# Patient Record
Sex: MALE | Race: WHITE | NOT HISPANIC OR LATINO | URBAN - METROPOLITAN AREA
[De-identification: names, ages, dates, MRNs, and addresses within clinical notes are randomized per-mention and may not be internally consistent; named-entity substitution may affect disease eponyms.]

---

## 2023-04-08 ENCOUNTER — INPATIENT (INPATIENT)
Facility: HOSPITAL | Age: 68
LOS: 15 days | Discharge: HOME | DRG: 91 | End: 2023-04-24
Attending: FAMILY MEDICINE | Admitting: FAMILY MEDICINE
Payer: MEDICARE

## 2023-04-08 VITALS
HEART RATE: 98 BPM | HEIGHT: 70 IN | RESPIRATION RATE: 20 BRPM | SYSTOLIC BLOOD PRESSURE: 139 MMHG | DIASTOLIC BLOOD PRESSURE: 83 MMHG | WEIGHT: 169.98 LBS | TEMPERATURE: 97 F | OXYGEN SATURATION: 98 %

## 2023-04-08 DIAGNOSIS — R41.82 ALTERED MENTAL STATUS, UNSPECIFIED: ICD-10-CM

## 2023-04-08 DIAGNOSIS — E03.9 HYPOTHYROIDISM, UNSPECIFIED: ICD-10-CM

## 2023-04-08 DIAGNOSIS — R33.9 RETENTION OF URINE, UNSPECIFIED: ICD-10-CM

## 2023-04-08 DIAGNOSIS — Z29.9 ENCOUNTER FOR PROPHYLACTIC MEASURES, UNSPECIFIED: ICD-10-CM

## 2023-04-08 DIAGNOSIS — N18.30 CHRONIC KIDNEY DISEASE, STAGE 3 UNSPECIFIED: ICD-10-CM

## 2023-04-08 DIAGNOSIS — Z98.890 OTHER SPECIFIED POSTPROCEDURAL STATES: Chronic | ICD-10-CM

## 2023-04-08 DIAGNOSIS — G47.00 INSOMNIA, UNSPECIFIED: ICD-10-CM

## 2023-04-08 DIAGNOSIS — F31.9 BIPOLAR DISORDER, UNSPECIFIED: ICD-10-CM

## 2023-04-08 LAB
ACETONE SERPL-MCNC: NEGATIVE — SIGNIFICANT CHANGE UP
ALBUMIN SERPL ELPH-MCNC: 4.2 G/DL — SIGNIFICANT CHANGE UP (ref 3.3–5)
ALP SERPL-CCNC: 116 U/L — SIGNIFICANT CHANGE UP (ref 40–120)
ALT FLD-CCNC: 48 U/L — SIGNIFICANT CHANGE UP (ref 12–78)
AMMONIA BLD-MCNC: 23 UMOL/L — SIGNIFICANT CHANGE UP (ref 11–32)
ANION GAP SERPL CALC-SCNC: 4 MMOL/L — LOW (ref 5–17)
APPEARANCE UR: CLEAR — SIGNIFICANT CHANGE UP
APTT BLD: 28.9 SEC — SIGNIFICANT CHANGE UP (ref 27.5–35.5)
AST SERPL-CCNC: 36 U/L — SIGNIFICANT CHANGE UP (ref 15–37)
BASOPHILS # BLD AUTO: 0.05 K/UL — SIGNIFICANT CHANGE UP (ref 0–0.2)
BASOPHILS NFR BLD AUTO: 0.4 % — SIGNIFICANT CHANGE UP (ref 0–2)
BILIRUB SERPL-MCNC: 0.9 MG/DL — SIGNIFICANT CHANGE UP (ref 0.2–1.2)
BILIRUB UR-MCNC: NEGATIVE — SIGNIFICANT CHANGE UP
BUN SERPL-MCNC: 24 MG/DL — HIGH (ref 7–23)
CALCIUM SERPL-MCNC: 9.9 MG/DL — SIGNIFICANT CHANGE UP (ref 8.5–10.1)
CHLORIDE SERPL-SCNC: 110 MMOL/L — HIGH (ref 96–108)
CO2 SERPL-SCNC: 23 MMOL/L — SIGNIFICANT CHANGE UP (ref 22–31)
COLOR SPEC: YELLOW — SIGNIFICANT CHANGE UP
COMMENT - URINE: SIGNIFICANT CHANGE UP
CREAT SERPL-MCNC: 1.9 MG/DL — HIGH (ref 0.5–1.3)
DIFF PNL FLD: NEGATIVE — SIGNIFICANT CHANGE UP
EGFR: 38 ML/MIN/1.73M2 — LOW
EOSINOPHIL # BLD AUTO: 0.17 K/UL — SIGNIFICANT CHANGE UP (ref 0–0.5)
EOSINOPHIL NFR BLD AUTO: 1.4 % — SIGNIFICANT CHANGE UP (ref 0–6)
FLUAV AG NPH QL: SIGNIFICANT CHANGE UP
FLUBV AG NPH QL: SIGNIFICANT CHANGE UP
GLUCOSE SERPL-MCNC: 98 MG/DL — SIGNIFICANT CHANGE UP (ref 70–99)
GLUCOSE UR QL: NEGATIVE — SIGNIFICANT CHANGE UP
HCT VFR BLD CALC: 40.4 % — SIGNIFICANT CHANGE UP (ref 39–50)
HGB BLD-MCNC: 13.3 G/DL — SIGNIFICANT CHANGE UP (ref 13–17)
IMM GRANULOCYTES NFR BLD AUTO: 0.4 % — SIGNIFICANT CHANGE UP (ref 0–0.9)
INR BLD: 1.19 RATIO — HIGH (ref 0.88–1.16)
KETONES UR-MCNC: ABNORMAL
LACTATE SERPL-SCNC: 0.9 MMOL/L — SIGNIFICANT CHANGE UP (ref 0.7–2)
LEUKOCYTE ESTERASE UR-ACNC: NEGATIVE — SIGNIFICANT CHANGE UP
LITHIUM SERPL-MCNC: 2.3 MMOL/L — CRITICAL HIGH (ref 0.6–1.2)
LYMPHOCYTES # BLD AUTO: 1.39 K/UL — SIGNIFICANT CHANGE UP (ref 1–3.3)
LYMPHOCYTES # BLD AUTO: 11.3 % — LOW (ref 13–44)
MCHC RBC-ENTMCNC: 30.2 PG — SIGNIFICANT CHANGE UP (ref 27–34)
MCHC RBC-ENTMCNC: 32.9 GM/DL — SIGNIFICANT CHANGE UP (ref 32–36)
MCV RBC AUTO: 91.6 FL — SIGNIFICANT CHANGE UP (ref 80–100)
MONOCYTES # BLD AUTO: 1.03 K/UL — HIGH (ref 0–0.9)
MONOCYTES NFR BLD AUTO: 8.4 % — SIGNIFICANT CHANGE UP (ref 2–14)
NEUTROPHILS # BLD AUTO: 9.61 K/UL — HIGH (ref 1.8–7.4)
NEUTROPHILS NFR BLD AUTO: 78.1 % — HIGH (ref 43–77)
NITRITE UR-MCNC: NEGATIVE — SIGNIFICANT CHANGE UP
NRBC # BLD: 0 /100 WBCS — SIGNIFICANT CHANGE UP (ref 0–0)
PH UR: 6 — SIGNIFICANT CHANGE UP (ref 5–8)
PLATELET # BLD AUTO: 203 K/UL — SIGNIFICANT CHANGE UP (ref 150–400)
POTASSIUM SERPL-MCNC: 3.5 MMOL/L — SIGNIFICANT CHANGE UP (ref 3.5–5.3)
POTASSIUM SERPL-SCNC: 3.5 MMOL/L — SIGNIFICANT CHANGE UP (ref 3.5–5.3)
PROCALCITONIN SERPL-MCNC: 0.08 NG/ML — SIGNIFICANT CHANGE UP
PROT SERPL-MCNC: 7.4 G/DL — SIGNIFICANT CHANGE UP (ref 6–8.3)
PROT UR-MCNC: 15
PROTHROM AB SERPL-ACNC: 13.9 SEC — HIGH (ref 10.5–13.4)
RBC # BLD: 4.41 M/UL — SIGNIFICANT CHANGE UP (ref 4.2–5.8)
RBC # FLD: 12.5 % — SIGNIFICANT CHANGE UP (ref 10.3–14.5)
RBC CASTS # UR COMP ASSIST: SIGNIFICANT CHANGE UP /HPF (ref 0–4)
RSV RNA NPH QL NAA+NON-PROBE: SIGNIFICANT CHANGE UP
SARS-COV-2 RNA SPEC QL NAA+PROBE: SIGNIFICANT CHANGE UP
SODIUM SERPL-SCNC: 137 MMOL/L — SIGNIFICANT CHANGE UP (ref 135–145)
SP GR SPEC: 1.01 — SIGNIFICANT CHANGE UP (ref 1.01–1.02)
UROBILINOGEN FLD QL: NEGATIVE — SIGNIFICANT CHANGE UP
WBC # BLD: 12.3 K/UL — HIGH (ref 3.8–10.5)
WBC # FLD AUTO: 12.3 K/UL — HIGH (ref 3.8–10.5)
WBC UR QL: SIGNIFICANT CHANGE UP

## 2023-04-08 PROCEDURE — 71045 X-RAY EXAM CHEST 1 VIEW: CPT | Mod: 26

## 2023-04-08 PROCEDURE — 70450 CT HEAD/BRAIN W/O DYE: CPT | Mod: 26,MA

## 2023-04-08 PROCEDURE — 99285 EMERGENCY DEPT VISIT HI MDM: CPT

## 2023-04-08 PROCEDURE — 93010 ELECTROCARDIOGRAM REPORT: CPT

## 2023-04-08 RX ORDER — BUPROPION HYDROCHLORIDE 150 MG/1
150 TABLET, EXTENDED RELEASE ORAL DAILY
Refills: 0 | Status: DISCONTINUED | OUTPATIENT
Start: 2023-04-08 | End: 2023-04-08

## 2023-04-08 RX ORDER — QUETIAPINE FUMARATE 200 MG/1
200 TABLET, FILM COATED ORAL AT BEDTIME
Refills: 0 | Status: DISCONTINUED | OUTPATIENT
Start: 2023-04-08 | End: 2023-04-08

## 2023-04-08 RX ORDER — TAMSULOSIN HYDROCHLORIDE 0.4 MG/1
0.4 CAPSULE ORAL AT BEDTIME
Refills: 0 | Status: DISCONTINUED | OUTPATIENT
Start: 2023-04-08 | End: 2023-04-10

## 2023-04-08 RX ORDER — TAMSULOSIN HYDROCHLORIDE 0.4 MG/1
0.4 CAPSULE ORAL
Refills: 0 | Status: DISCONTINUED | OUTPATIENT
Start: 2023-04-08 | End: 2023-04-08

## 2023-04-08 RX ORDER — SODIUM CHLORIDE 9 MG/ML
1000 INJECTION INTRAMUSCULAR; INTRAVENOUS; SUBCUTANEOUS ONCE
Refills: 0 | Status: COMPLETED | OUTPATIENT
Start: 2023-04-08 | End: 2023-04-08

## 2023-04-08 RX ORDER — ACETAMINOPHEN 500 MG
650 TABLET ORAL EVERY 6 HOURS
Refills: 0 | Status: DISCONTINUED | OUTPATIENT
Start: 2023-04-08 | End: 2023-04-08

## 2023-04-08 RX ORDER — ONDANSETRON 8 MG/1
4 TABLET, FILM COATED ORAL EVERY 8 HOURS
Refills: 0 | Status: DISCONTINUED | OUTPATIENT
Start: 2023-04-08 | End: 2023-04-12

## 2023-04-08 RX ORDER — BENZTROPINE MESYLATE 1 MG
1 TABLET ORAL DAILY
Refills: 0 | Status: DISCONTINUED | OUTPATIENT
Start: 2023-04-08 | End: 2023-04-08

## 2023-04-08 RX ORDER — SODIUM CHLORIDE 9 MG/ML
1000 INJECTION INTRAMUSCULAR; INTRAVENOUS; SUBCUTANEOUS
Refills: 0 | Status: DISCONTINUED | OUTPATIENT
Start: 2023-04-08 | End: 2023-04-09

## 2023-04-08 RX ORDER — LITHIUM CARBONATE 300 MG/1
600 TABLET, EXTENDED RELEASE ORAL AT BEDTIME
Refills: 0 | Status: DISCONTINUED | OUTPATIENT
Start: 2023-04-08 | End: 2023-04-08

## 2023-04-08 RX ORDER — LITHIUM CARBONATE 300 MG/1
300 TABLET, EXTENDED RELEASE ORAL
Refills: 0 | Status: DISCONTINUED | OUTPATIENT
Start: 2023-04-08 | End: 2023-04-08

## 2023-04-08 RX ORDER — LEVOTHYROXINE SODIUM 125 MCG
12.5 TABLET ORAL AT BEDTIME
Refills: 0 | Status: DISCONTINUED | OUTPATIENT
Start: 2023-04-08 | End: 2023-04-11

## 2023-04-08 RX ORDER — LANOLIN ALCOHOL/MO/W.PET/CERES
3 CREAM (GRAM) TOPICAL AT BEDTIME
Refills: 0 | Status: DISCONTINUED | OUTPATIENT
Start: 2023-04-08 | End: 2023-04-08

## 2023-04-08 RX ORDER — HEPARIN SODIUM 5000 [USP'U]/ML
5000 INJECTION INTRAVENOUS; SUBCUTANEOUS EVERY 8 HOURS
Refills: 0 | Status: DISCONTINUED | OUTPATIENT
Start: 2023-04-08 | End: 2023-04-11

## 2023-04-08 RX ORDER — HALOPERIDOL DECANOATE 100 MG/ML
1 INJECTION INTRAMUSCULAR EVERY 6 HOURS
Refills: 0 | Status: DISCONTINUED | OUTPATIENT
Start: 2023-04-08 | End: 2023-04-12

## 2023-04-08 RX ORDER — LEVOTHYROXINE SODIUM 125 MCG
25 TABLET ORAL DAILY
Refills: 0 | Status: DISCONTINUED | OUTPATIENT
Start: 2023-04-08 | End: 2023-04-08

## 2023-04-08 RX ADMIN — SODIUM CHLORIDE 150 MILLILITER(S): 9 INJECTION INTRAMUSCULAR; INTRAVENOUS; SUBCUTANEOUS at 21:20

## 2023-04-08 RX ADMIN — SODIUM CHLORIDE 150 MILLILITER(S): 9 INJECTION INTRAMUSCULAR; INTRAVENOUS; SUBCUTANEOUS at 16:10

## 2023-04-08 RX ADMIN — Medication 12.5 MICROGRAM(S): at 23:28

## 2023-04-08 RX ADMIN — SODIUM CHLORIDE 1000 MILLILITER(S): 9 INJECTION INTRAMUSCULAR; INTRAVENOUS; SUBCUTANEOUS at 07:24

## 2023-04-08 RX ADMIN — Medication 1 MILLIGRAM(S): at 12:46

## 2023-04-08 RX ADMIN — HEPARIN SODIUM 5000 UNIT(S): 5000 INJECTION INTRAVENOUS; SUBCUTANEOUS at 13:43

## 2023-04-08 RX ADMIN — HEPARIN SODIUM 5000 UNIT(S): 5000 INJECTION INTRAVENOUS; SUBCUTANEOUS at 21:20

## 2023-04-08 NOTE — H&P ADULT - NSICDXFAMILYHX_GEN_ALL_CORE_FT
FAMILY HISTORY:  Mother  Still living? Unknown  FH: Alzheimers disease, Age at diagnosis: Age Unknown  FH: Parkinson's disease, Age at diagnosis: 71-80

## 2023-04-08 NOTE — H&P ADULT - ASSESSMENT
68 y/o male PMH BPD (diagnosed 2005), CKD3, hypothyroidism Urinary retention and insomnia presents to the ED for altered mental status with increased weakness and decreased po intake. Admit for AMS.

## 2023-04-08 NOTE — ED PROVIDER NOTE - SIGNIFICANT NEGATIVE FINDINGS
no headache, no chest pain, no SOB, no palpitations, no fever, no chills, no abdominal pain, no n/v/d, no urinary symptoms, no GI  bleeding. no neuro changes.

## 2023-04-08 NOTE — H&P ADULT - PROBLEM SELECTOR PLAN 2
Chronic, diagnosed 2005.  - Recent admission to [facility] for manic episode, Bupropion decreased from 150mg TID to once a day  - Continue bupropion XL 150mg daily  - Continue lithium 300mg AM daily and 600mg at bedtime daily  - Previously following with Psych NP: Concha Amaro, CHRISTINAP, now has appointment with [facility]  - Psych (Dr. Floyd) consulted, appreciate recs

## 2023-04-08 NOTE — PATIENT PROFILE ADULT - FUNCTIONAL ASSESSMENT - BASIC MOBILITY ASSESSMENT TYPE
[de-identified] : 
Admission

## 2023-04-08 NOTE — H&P ADULT - PROBLEM SELECTOR PLAN 1
AMS in the setting of progressive dementia vs. medications vs. electrolyte imbalance  - Patient w/ unsteady gait, multiple mechanical falls, decreased po intake, dysphagia  - Leukocytosis (12.30) of unclear etiology on admission  - CT head: Grossly, no acute hemorrhage mass or mass effect seen. Better  CT is recommended when   patient can tolerate it or sedation be given.  - UA: negative   - AAOx0 (person) on admission  - Fall, aspiration precautions  - Speech and Swallow consulted, appreciate recs. NPO except meds for now  - F/u UCx, BCx x2  - Neuro ( ___) consulted, f/u recs  - Psych (Dr. Floyd) consulted, f/u recs  - PT/OT, SW, nutrition consulted , f/u recs AMS in the setting of progressive dementia vs. medications vs. electrolyte imbalance  - Patient w/ unsteady gait, multiple mechanical falls, decreased po intake, dysphagia  - Leukocytosis (12.30) of unclear etiology on admission  - CT head: Grossly, no acute hemorrhage mass or mass effect seen. Better  CT is recommended when   patient can tolerate it or sedation be given.  - UA: negative   - AAOx0 (person) on admission  - Fall, aspiration precautions  - Speech and Swallow consulted, appreciate recs. NPO except meds for now  - F/u UCx, BCx x2  - DAY TEAM TO CONSULT NEUROLOGY- unavailable over the weekend  - Psych (Dr. Floyd) consulted, f/u recs  - PT/OT, SW, nutrition consulted , f/u recs

## 2023-04-08 NOTE — H&P ADULT - NSICDXPASTMEDICALHX_GEN_ALL_CORE_FT
PAST MEDICAL HISTORY:  Bipolar disorder     Hard of hearing     Hypothyroidism     Insomnia     Stage 3 chronic kidney disease     Urinary retention

## 2023-04-08 NOTE — ED PROVIDER NOTE - CONSTITUTIONAL, MLM
Chronically ill,  awake, confusion , oriented to person, place,  and in moderate  distress. normal...

## 2023-04-08 NOTE — PROGRESS NOTE ADULT - SUBJECTIVE AND OBJECTIVE BOX
Renal consult called for MICHELL, Elevated lithium level. Continue IV hydration with saline.   Repeat stat lithium levels. No indication for HD at this time. Full consult to follow.

## 2023-04-08 NOTE — PATIENT PROFILE ADULT - FALL HARM RISK - HARM RISK INTERVENTIONS

## 2023-04-08 NOTE — ED ADULT NURSE NOTE - CHIEF COMPLAINT QUOTE
Pt was discharged from psych at [facility] 2-3 weeks ago after manic episode. Pt family states pt has AMS x 10 days, confused, multiple falls

## 2023-04-08 NOTE — H&P ADULT - NSHPSOCIALHISTORY_GEN_ALL_CORE
Tobacco: denied  EtOH: Former, used to drink beer, quit in 2005 after BPD diagnosis  Recreational drug use: denied  Lives with: wife  Ambulates: with assistance  ADLs: needs assistance  Occupation: Former postal , retired last year  Vaccinations: COVID UTD s/p antibody infusion therapy, Flu UTD

## 2023-04-08 NOTE — H&P ADULT - PROBLEM SELECTOR PLAN 3
Chronic   - Duenas cath placed in ED  - Output 800 cc urine per RN  - Continue home tamsulosin 0.4mg BID

## 2023-04-08 NOTE — H&P ADULT - HISTORY OF PRESENT ILLNESS
66 y/o male PMH BPD (diagnosed 2005), CKD3, hypothyroidism Urinary retention and insomnia presents to the ED for altered mental status with increased weakness and decreased po intake. Patient unable to answer questions secondary to mental status. History obtained from patient's wife and son via telephone. Patient was recently admitted to [facility] for a manic episode ~3 weeks ago. There his dose of Bupropion was decreased 150mg TID to once daily, he also had a normal CT head and was diagnosed with CKD3.  Soon after discharge, patient began to have increased weakness, unsteady gait with multiple mechanical falls, slurred speech, and decreased po intake. These symptoms seemed to have a rapid onset ~10 days and have not improved. His son also notes, he seemed to have pain with swallowing as well.   Of note, patient's mom also had hx of Alzheimer's dementia and parkinson's disease which she was diagnosed with in her 70s and family is concerned he might be developing a neurological disorder.   At time of exam, patient unable to answer questions or follow commands.     ED Course  T: 96.6F HR 98 /83 RR 20 SpO2 98% on RA  Labs:  wbc 12.30 PT/INR 13.9/1.19 BUN/Cr 24/1.90  UA: negative  CXR: grossly clear (f/u official read)  EKG: NSR HR 80  CT Head: Grossly, no acute hemorrhage mass or mass effect seen. Better  CT is recommended when patient can tolerate it or sedation be given.    In the ED, pt given NS 1L bolus x1, Ativan 1mg IVPx1

## 2023-04-08 NOTE — ED ADULT NURSE NOTE - HPI (INCLUDE ILLNESS QUALITY, SEVERITY, DURATION, TIMING, CONTEXT, MODIFYING FACTORS, ASSOCIATED SIGNS AND SYMPTOMS)
Son stated patient has history of Bipolar and manic behavior. Was off meds, recently discharged from [facility] to home, unable to sleep, pacing, throwing things, and unaware of safety limitations for the past 10 days. difficult to manage at home.

## 2023-04-08 NOTE — ED PROVIDER NOTE - CLINICAL SUMMARY MEDICAL DECISION MAKING FREE TEXT BOX
68 y/o male Bipolar disorder, Stage-3 kidney disease, C/C increasing weakness,  mental confusion, unsteady and  multiple falls. ....  IVF, labs CT head

## 2023-04-08 NOTE — ED ADULT NURSE NOTE - NSIMPLEMENTINTERV_GEN_ALL_ED
Implemented All Fall Risk Interventions:  [place] to call system. Call bell, personal items and telephone within reach. Instruct patient to call for assistance. Room bathroom lighting operational. Non-slip footwear when patient is off stretcher. Physically safe environment: no spills, clutter or unnecessary equipment. Stretcher in lowest position, wheels locked, appropriate side rails in place. Provide visual cue, wrist band, yellow gown, etc. Monitor gait and stability. Monitor for mental status changes and reorient to person, place, and time. Review medications for side effects contributing to fall risk. Reinforce activity limits and safety measures with patient and family.

## 2023-04-08 NOTE — H&P ADULT - NSHPPHYSICALEXAM_GEN_ALL_CORE
PHYSICAL EXAM: +carey cath  GENERAL: confused male laying in bed   HEAD:  Atraumatic, Normocephalic  EYES: EOMI, PERRLA, conjunctiva and sclera clear  NECK: Normal ROM, No JVD  CHEST/LUNG: +mouth breathing, Clear to auscultation bilaterally; No wheeze  HEART: Regular rate and rhythm; No murmurs, rubs, or gallops  ABDOMEN: Soft, Nontender, Nondistended; Bowel sounds present  EXTREMITIES:  contracted b/l UE, 2+ Peripheral Pulses, No clubbing, cyanosis, or edema  PSYCH: AAOx0  NEUROLOGY: unable to answer questions or follow commands  SKIN: No rashes or lesions

## 2023-04-08 NOTE — ED ADULT NURSE NOTE - OBJECTIVE STATEMENT
son stated patient was at [facility] for bipolar disorder and manic behaviors> pt was off meds and re-started on discharge, Patient has been steadily declining for the past 10 days. inability to sleep, pacing, and throwing items around the home.

## 2023-04-08 NOTE — H&P ADULT - NSHPLABSRESULTS_GEN_ALL_CORE
137  |  110<H>  |  24<H>  ----------------------------<  98  3.5   |  23  |  1.90<H>    Ca    9.9      2023 07:00    TPro  7.4  /  Alb  4.2  /  TBili  0.9  /  DBili  x   /  AST  36  /  ALT  48  /  AlkPhos  116                              13.3   12.30 )-----------( 203      ( 2023 07:00 )             40.4             LIVER FUNCTIONS - ( 2023 07:00 )  Alb: 4.2 g/dL / Pro: 7.4 g/dL / ALK PHOS: 116 U/L / ALT: 48 U/L / AST: 36 U/L / GGT: x             PT/INR - ( 2023 07:00 )   PT: 13.9 sec;   INR: 1.19 ratio         PTT - ( 2023 07:00 )  PTT:28.9 sec    Urinalysis Basic - ( 2023 10:40 )    Color: Yellow / Appearance: Clear / S.010 / pH: x  Gluc: x / Ketone: Trace  / Bili: Negative / Urobili: Negative   Blood: x / Protein: 15 / Nitrite: Negative   Leuk Esterase: Negative / RBC: 0-2 /HPF / WBC 0-2   Sq Epi: x / Non Sq Epi: x / Bacteria: x

## 2023-04-08 NOTE — ED PROVIDER NOTE - OBJECTIVE STATEMENT
Pt was discharged from psych at [facility] 2-3 weeks ago after manic episode. Pt family states pt has AMS x 10 days, confused, multiple falls  altered mental status 66 y/o male Bipolar disorder, Stage-3 kidney disease, C/C increasing mental confusion and multiple falls x 10 days   Recent  psych admission at [facility] 2-3 weeks ago for  anitha . no headache, no chest pain, no SOB, no palpitations, no fever, no chills, no abdominal pain, no n/v/d, no urinary symptoms, no GI  bleeding. no neuro changes. 66 y/o male Bipolar disorder, Stage-3 kidney disease, C/C increasing weakness,  mental confusion, unsteady and  multiple falls.   Recent  psych admission at [facility] 2-3 weeks ago for  anitha . no headache, no chest pain, no SOB, no palpitations, no fever, no chills, no abdominal pain, no n/v/d, no urinary symptoms, no GI  bleeding. no neuro changes.

## 2023-04-08 NOTE — ED PROVIDER NOTE - ENMT, MLM
Airway patent, Nasal mucosa clear. Mouth  mucosa dry. Throat has no vesicles, no oropharyngeal exudates and uvula is midline.

## 2023-04-08 NOTE — ED PROVIDER NOTE - CARE PLAN
Principal Discharge DX:	AMS (altered mental status)  Secondary Diagnosis:	Frequent falls   1 Principal Discharge DX:	AMS (altered mental status)  Secondary Diagnosis:	Frequent falls  Secondary Diagnosis:	Chronic renal disease

## 2023-04-08 NOTE — H&P ADULT - PROBLEM SELECTOR PLAN 4
Patient recently diagnosed w/ CKD3 at [facility]  - Cr. 1.9 on admission, baseline unknown  - Continue to monitor with daily BMP  - Avoid nephrotoxic agents.

## 2023-04-08 NOTE — ED ADULT TRIAGE NOTE - CHIEF COMPLAINT QUOTE
Pt was discharged from psych at [facility] 2-3 weeks ago after manic episode. Pt family states pt has AMS x 10 days, confused, multiple falls

## 2023-04-09 DIAGNOSIS — T56.891A TOXIC EFFECT OF OTHER METALS, ACCIDENTAL (UNINTENTIONAL), INITIAL ENCOUNTER: ICD-10-CM

## 2023-04-09 LAB
ALBUMIN SERPL ELPH-MCNC: 3.4 G/DL — SIGNIFICANT CHANGE UP (ref 3.3–5)
ALP SERPL-CCNC: 103 U/L — SIGNIFICANT CHANGE UP (ref 40–120)
ALT FLD-CCNC: 33 U/L — SIGNIFICANT CHANGE UP (ref 12–78)
ANION GAP SERPL CALC-SCNC: 6 MMOL/L — SIGNIFICANT CHANGE UP (ref 5–17)
AST SERPL-CCNC: 28 U/L — SIGNIFICANT CHANGE UP (ref 15–37)
BASOPHILS # BLD AUTO: 0.04 K/UL — SIGNIFICANT CHANGE UP (ref 0–0.2)
BASOPHILS NFR BLD AUTO: 0.4 % — SIGNIFICANT CHANGE UP (ref 0–2)
BILIRUB SERPL-MCNC: 0.8 MG/DL — SIGNIFICANT CHANGE UP (ref 0.2–1.2)
BUN SERPL-MCNC: 15 MG/DL — SIGNIFICANT CHANGE UP (ref 7–23)
CALCIUM SERPL-MCNC: 8.9 MG/DL — SIGNIFICANT CHANGE UP (ref 8.5–10.1)
CHLORIDE SERPL-SCNC: 120 MMOL/L — HIGH (ref 96–108)
CO2 SERPL-SCNC: 21 MMOL/L — LOW (ref 22–31)
CREAT SERPL-MCNC: 1.3 MG/DL — SIGNIFICANT CHANGE UP (ref 0.5–1.3)
CULTURE RESULTS: NO GROWTH — SIGNIFICANT CHANGE UP
EGFR: 59 ML/MIN/1.73M2 — LOW
EOSINOPHIL # BLD AUTO: 0.08 K/UL — SIGNIFICANT CHANGE UP (ref 0–0.5)
EOSINOPHIL NFR BLD AUTO: 0.8 % — SIGNIFICANT CHANGE UP (ref 0–6)
GLUCOSE SERPL-MCNC: 80 MG/DL — SIGNIFICANT CHANGE UP (ref 70–99)
HCT VFR BLD CALC: 34.4 % — LOW (ref 39–50)
HCV AB S/CO SERPL IA: 0.09 S/CO — SIGNIFICANT CHANGE UP (ref 0–0.99)
HCV AB SERPL-IMP: SIGNIFICANT CHANGE UP
HGB BLD-MCNC: 11.3 G/DL — LOW (ref 13–17)
IMM GRANULOCYTES NFR BLD AUTO: 0.5 % — SIGNIFICANT CHANGE UP (ref 0–0.9)
LITHIUM SERPL-MCNC: 1.3 MMOL/L — HIGH (ref 0.6–1.2)
LITHIUM SERPL-MCNC: 1.6 MMOL/L — CRITICAL HIGH (ref 0.6–1.2)
LYMPHOCYTES # BLD AUTO: 1.13 K/UL — SIGNIFICANT CHANGE UP (ref 1–3.3)
LYMPHOCYTES # BLD AUTO: 10.6 % — LOW (ref 13–44)
MCHC RBC-ENTMCNC: 30.2 PG — SIGNIFICANT CHANGE UP (ref 27–34)
MCHC RBC-ENTMCNC: 32.8 GM/DL — SIGNIFICANT CHANGE UP (ref 32–36)
MCV RBC AUTO: 92 FL — SIGNIFICANT CHANGE UP (ref 80–100)
MONOCYTES # BLD AUTO: 1.05 K/UL — HIGH (ref 0–0.9)
MONOCYTES NFR BLD AUTO: 9.9 % — SIGNIFICANT CHANGE UP (ref 2–14)
NEUTROPHILS # BLD AUTO: 8.28 K/UL — HIGH (ref 1.8–7.4)
NEUTROPHILS NFR BLD AUTO: 77.8 % — HIGH (ref 43–77)
NRBC # BLD: 0 /100 WBCS — SIGNIFICANT CHANGE UP (ref 0–0)
PLATELET # BLD AUTO: 164 K/UL — SIGNIFICANT CHANGE UP (ref 150–400)
POTASSIUM SERPL-MCNC: 3.3 MMOL/L — LOW (ref 3.5–5.3)
POTASSIUM SERPL-SCNC: 3.3 MMOL/L — LOW (ref 3.5–5.3)
PROT SERPL-MCNC: 6 G/DL — SIGNIFICANT CHANGE UP (ref 6–8.3)
RBC # BLD: 3.74 M/UL — LOW (ref 4.2–5.8)
RBC # FLD: 12.5 % — SIGNIFICANT CHANGE UP (ref 10.3–14.5)
SODIUM SERPL-SCNC: 147 MMOL/L — HIGH (ref 135–145)
SPECIMEN SOURCE: SIGNIFICANT CHANGE UP
TSH SERPL-MCNC: 3.12 UIU/ML — SIGNIFICANT CHANGE UP (ref 0.36–3.74)
WBC # BLD: 10.63 K/UL — HIGH (ref 3.8–10.5)
WBC # FLD AUTO: 10.63 K/UL — HIGH (ref 3.8–10.5)

## 2023-04-09 RX ORDER — POTASSIUM CHLORIDE 20 MEQ
10 PACKET (EA) ORAL ONCE
Refills: 0 | Status: COMPLETED | OUTPATIENT
Start: 2023-04-09 | End: 2023-04-09

## 2023-04-09 RX ORDER — HALOPERIDOL DECANOATE 100 MG/ML
1 INJECTION INTRAMUSCULAR ONCE
Refills: 0 | Status: COMPLETED | OUTPATIENT
Start: 2023-04-09 | End: 2023-04-09

## 2023-04-09 RX ORDER — SODIUM CHLORIDE 9 MG/ML
1000 INJECTION, SOLUTION INTRAVENOUS
Refills: 0 | Status: DISCONTINUED | OUTPATIENT
Start: 2023-04-09 | End: 2023-04-10

## 2023-04-09 RX ORDER — POTASSIUM CHLORIDE 20 MEQ
40 PACKET (EA) ORAL ONCE
Refills: 0 | Status: DISCONTINUED | OUTPATIENT
Start: 2023-04-09 | End: 2023-04-09

## 2023-04-09 RX ADMIN — HALOPERIDOL DECANOATE 1 MILLIGRAM(S): 100 INJECTION INTRAMUSCULAR at 09:59

## 2023-04-09 RX ADMIN — Medication 1 MILLIGRAM(S): at 12:25

## 2023-04-09 RX ADMIN — HALOPERIDOL DECANOATE 1 MILLIGRAM(S): 100 INJECTION INTRAMUSCULAR at 16:45

## 2023-04-09 RX ADMIN — HALOPERIDOL DECANOATE 1 MILLIGRAM(S): 100 INJECTION INTRAMUSCULAR at 02:18

## 2023-04-09 RX ADMIN — SODIUM CHLORIDE 150 MILLILITER(S): 9 INJECTION INTRAMUSCULAR; INTRAVENOUS; SUBCUTANEOUS at 06:06

## 2023-04-09 RX ADMIN — HEPARIN SODIUM 5000 UNIT(S): 5000 INJECTION INTRAVENOUS; SUBCUTANEOUS at 13:49

## 2023-04-09 RX ADMIN — SODIUM CHLORIDE 150 MILLILITER(S): 9 INJECTION INTRAMUSCULAR; INTRAVENOUS; SUBCUTANEOUS at 09:59

## 2023-04-09 RX ADMIN — HALOPERIDOL DECANOATE 1 MILLIGRAM(S): 100 INJECTION INTRAMUSCULAR at 23:55

## 2023-04-09 RX ADMIN — Medication 1 MILLIGRAM(S): at 23:52

## 2023-04-09 RX ADMIN — SODIUM CHLORIDE 80 MILLILITER(S): 9 INJECTION, SOLUTION INTRAVENOUS at 18:50

## 2023-04-09 RX ADMIN — Medication 12.5 MICROGRAM(S): at 21:09

## 2023-04-09 RX ADMIN — HEPARIN SODIUM 5000 UNIT(S): 5000 INJECTION INTRAVENOUS; SUBCUTANEOUS at 06:06

## 2023-04-09 RX ADMIN — HEPARIN SODIUM 5000 UNIT(S): 5000 INJECTION INTRAVENOUS; SUBCUTANEOUS at 21:09

## 2023-04-09 RX ADMIN — Medication 100 MILLIEQUIVALENT(S): at 16:45

## 2023-04-09 RX ADMIN — Medication 100 MILLIEQUIVALENT(S): at 18:50

## 2023-04-09 RX ADMIN — HALOPERIDOL DECANOATE 1 MILLIGRAM(S): 100 INJECTION INTRAMUSCULAR at 23:25

## 2023-04-09 RX ADMIN — SODIUM CHLORIDE 80 MILLILITER(S): 9 INJECTION, SOLUTION INTRAVENOUS at 14:02

## 2023-04-09 RX ADMIN — Medication 1 MILLIGRAM(S): at 23:56

## 2023-04-09 NOTE — DIETITIAN NUTRITION RISK NOTIFICATION - TREATMENT: THE FOLLOWING DIET HAS BEEN RECOMMENDED
Diet, NPO:   Except Medications (04-08-23 @ 13:13) [Active]    RD avail for nutrition support recommendation and or po diet /suppl recommendation s/p result of SLP eval

## 2023-04-09 NOTE — PROGRESS NOTE ADULT - PROBLEM SELECTOR PLAN 1
AMS in the setting of progressive dementia vs. medications vs. electrolyte imbalance  - Patient w/ unsteady gait, multiple mechanical falls, decreased po intake, dysphagia  - Leukocytosis (12.30) of unclear etiology on admission  - CT head: Grossly, no acute hemorrhage mass or mass effect seen. Better  CT is recommended when   patient can tolerate it or sedation be given.  - UA: negative   - AAOx0 (person) on admission  - Fall, aspiration precautions  - Speech and Swallow consulted, appreciate recs. NPO except meds for now  - F/u UCx, BCx x2 -- NTD  - Neuro consult  - Psych (Dr. Floyd) consulted, f/u recs  - PT/OT, SW, nutrition consulted , f/u recs

## 2023-04-09 NOTE — CONSULT NOTE ADULT - SUBJECTIVE AND OBJECTIVE BOX
Patient is a 67y old  Male who presents with a chief complaint of Altered mental status     (2023 09:11)    HPI:  68 y/o male PMH BPD (diagnosed ), CKD3, hypothyroidism Urinary retention and insomnia presents to the ED for altered mental status with increased weakness and decreased po intake. Patient unable to answer questions secondary to mental status. History obtained from patient's wife and son via telephone. Patient was recently admitted to [facility] for a manic episode ~3 weeks ago. There his dose of Bupropion was decreased 150mg TID to once daily, he also had a normal CT head and was diagnosed with CKD3.  Soon after discharge, patient began to have increased weakness, unsteady gait with multiple mechanical falls, slurred speech, and decreased po intake. These symptoms seemed to have a rapid onset ~10 days and have not improved. His son also notes, he seemed to have pain with swallowing as well.   Of note, patient's mom also had hx of Alzheimer's dementia and parkinson's disease which she was diagnosed with in her 70s and family is concerned he might be developing a neurological disorder.   At time of exam, patient unable to answer questions or follow commands.     ED Course  T: 96.6F HR 98 /83 RR 20 SpO2 98% on RA  Labs:  wbc 12.30 PT/INR 13.9/1.19 BUN/Cr 24/1.90  UA: negative  CXR: grossly clear (f/u official read)  EKG: NSR HR 80  CT Head: Grossly, no acute hemorrhage mass or mass effect seen. Better  CT is recommended when patient can tolerate it or sedation be given.    In the ED, pt given NS 1L bolus x1, Ativan 1mg IVPx1   (2023 13:21)      PAST MEDICAL HISTORY:  Bipolar disorder    Stage 3 chronic kidney disease    Hard of hearing    Hypothyroidism    Urinary retention    Insomnia        PAST SURGICAL HISTORY:  H/O hernia repair        FAMILY HISTORY:  FH: Parkinson's disease (Mother)    FH: Alzheimers disease (Mother)        SOCIAL HISTORY:    Allergies    No Known Allergies    Intolerances      Home Medications:  benztropine 1 mg oral tablet: 1 orally once a day (2023 13:21)  buPROPion 150 mg/24 hours (XL) oral tablet, extended release: 1 orally once a day (2023 13:21)  levothyroxine 25 mcg (0.025 mg) oral capsule: 1 orally once a day (2023 13:21)  lithium 300 mg oral capsule: 1 orally once a day (in the morning) (2023 13:21)  lithium 600 mg oral capsule: 1 orally once a day (at bedtime) (2023 13:21)  SEROquel 200 mg oral tablet: 1 orally once a day (2023 13:21)  tamsulosin 0.4 mg oral capsule: 1 orally 2 times a day (2023 13:21)    MEDICATIONS  (STANDING):  heparin   Injectable 5000 Unit(s) SubCutaneous every 8 hours  levothyroxine Injectable 12.5 MICROGram(s) IV Push at bedtime  sodium chloride 0.9%. 1000 milliLiter(s) (150 mL/Hr) IV Continuous <Continuous>  tamsulosin 0.4 milliGRAM(s) Oral at bedtime    MEDICATIONS  (PRN):  haloperidol    Injectable 1 milliGRAM(s) IntraMuscular every 6 hours PRN Agitation  ondansetron Injectable 4 milliGRAM(s) IV Push every 8 hours PRN Nausea and/or Vomiting      REVIEW OF SYSTEMS:  General:   Respiratory: No cough, SOB  Cardiovascular: No CP or Palpitations	  Gastrointestinal: No nausea, Vomiting. No diarrhea  Genitourinary: No urinary complaints	  Musculoskeletal: No leg swelling, No new rash or lesions	  Neurological: 	  all other systems negative    T(F): 98.4 (23 @ 05:20), Max: 99.2 (23 @ 12:14)  HR: 89 (23 @ 05:20) (83 - 92)  BP: 139/67 (23 @ 05:20) (133/95 - 144/67)  RR: 17 (23 @ 05:20) (17 - 17)  SpO2: 96% (23 @ 05:20) (92% - 98%)  Wt(kg): --    PHYSICAL EXAM:  General: NAD  Respiratory: b/l air entry  Cardiovascular: S1 S2  Gastrointestinal: soft  Extremities: edema            137  |  110<H>  |  24<H>  ----------------------------<  98  3.5   |  23  |  1.90<H>    Ca    9.9      2023 07:00    TPro  7.4  /  Alb  4.2  /  TBili  0.9  /  DBili  x   /  AST  36  /  ALT  48  /  AlkPhos  116                            11.3   10.63 )-----------( 164      ( 2023 08:15 )             34.4       Hemoglobin: 11.3 g/dL ( @ 08:15)  Hematocrit: 34.4 % ( @ 08:15)  Hemoglobin: 13.3 g/dL ( @ 07:00)  Hematocrit: 40.4 % ( @ 07:00)      Creatinine, Serum: 1.90 ( @ 07:00)      Urinalysis Basic - ( 2023 10:40 )    Color: Yellow / Appearance: Clear / S.010 / pH: x  Gluc: x / Ketone: Trace  / Bili: Negative / Urobili: Negative   Blood: x / Protein: 15 / Nitrite: Negative   Leuk Esterase: Negative / RBC: 0-2 /HPF / WBC 0-2   Sq Epi: x / Non Sq Epi: x / Bacteria: x      LIVER FUNCTIONS - ( 2023 07:00 )  Alb: 4.2 g/dL / Pro: 7.4 g/dL / ALK PHOS: 116 U/L / ALT: 48 U/L / AST: 36 U/L / GGT: x                       I&O's Detail    2023 07:01  -  2023 07:00  --------------------------------------------------------  IN:  Total IN: 0 mL    OUT:    Indwelling Catheter - Urethral (mL): 2800 mL  Total OUT: 2800 mL    Total NET: -2800 mL             Patient is a 67y old  Male who presents with a chief complaint of Altered mental status     (2023 09:11)    HPI:  68 y/o male PMH BPD (diagnosed ), CKD3, hypothyroidism Urinary retention and insomnia presents to the ED for altered mental status with increased weakness and decreased po intake. Patient unable to answer questions secondary to mental status. History obtained from patient's wife and son via telephone. Patient was recently admitted to [facility] for a manic episode ~3 weeks ago. There his dose of Bupropion was decreased 150mg TID to once daily, he also had a normal CT head and was diagnosed with CKD3.  Soon after discharge, patient began to have increased weakness, unsteady gait with multiple mechanical falls, slurred speech, and decreased po intake. These symptoms seemed to have a rapid onset ~10 days and have not improved. His son also notes, he seemed to have pain with swallowing as well.   Of note, patient's mom also had hx of Alzheimer's dementia and parkinson's disease which she was diagnosed with in her 70s and family is concerned he might be developing a neurological disorder.   At time of exam, patient unable to answer questions or follow commands.     ED Course  T: 96.6F HR 98 /83 RR 20 SpO2 98% on RA  Labs:  wbc 12.30 PT/INR 13.9/1.19 BUN/Cr 24/1.90  UA: negative  CXR: grossly clear (f/u official read)  EKG: NSR HR 80  CT Head: Grossly, no acute hemorrhage mass or mass effect seen. Better  CT is recommended when patient can tolerate it or sedation be given.    In the ED, pt given NS 1L bolus x1, Ativan 1mg IVPx1   (2023 13:21)    Renal consult called for MICHELL, ELevated lithium level.       PAST MEDICAL HISTORY:  Bipolar disorder    Stage 3 chronic kidney disease    Hard of hearing    Hypothyroidism    Urinary retention    Insomnia        PAST SURGICAL HISTORY:  H/O hernia repair        FAMILY HISTORY:  FH: Parkinson's disease (Mother)    FH: Alzheimers disease (Mother)        SOCIAL HISTORY: No smoking or alcohol use     Allergies    No Known Allergies    Intolerances      Home Medications:  benztropine 1 mg oral tablet: 1 orally once a day (2023 13:21)  buPROPion 150 mg/24 hours (XL) oral tablet, extended release: 1 orally once a day (2023 13:21)  levothyroxine 25 mcg (0.025 mg) oral capsule: 1 orally once a day (2023 13:21)  lithium 300 mg oral capsule: 1 orally once a day (in the morning) (2023 13:21)  lithium 600 mg oral capsule: 1 orally once a day (at bedtime) (2023 13:21)  SEROquel 200 mg oral tablet: 1 orally once a day (2023 13:21)  tamsulosin 0.4 mg oral capsule: 1 orally 2 times a day (2023 13:21)    MEDICATIONS  (STANDING):  heparin   Injectable 5000 Unit(s) SubCutaneous every 8 hours  levothyroxine Injectable 12.5 MICROGram(s) IV Push at bedtime  sodium chloride 0.9%. 1000 milliLiter(s) (150 mL/Hr) IV Continuous <Continuous>  tamsulosin 0.4 milliGRAM(s) Oral at bedtime    MEDICATIONS  (PRN):  haloperidol    Injectable 1 milliGRAM(s) IntraMuscular every 6 hours PRN Agitation  ondansetron Injectable 4 milliGRAM(s) IV Push every 8 hours PRN Nausea and/or Vomiting      REVIEW OF SYSTEMS:  General: confused  Respiratory: No cough, SOB  Cardiovascular: No CP or Palpitations	  Gastrointestinal: No nausea, Vomiting. No diarrhea  Genitourinary: No urinary complaints	  Musculoskeletal: No leg swelling, No new rash or lesions	  all other systems negative    T(F): 98.4 (23 @ 05:20), Max: 99.2 (23 @ 12:14)  HR: 89 (23 @ 05:20) (83 - 92)  BP: 139/67 (23 @ 05:20) (133/95 - 144/67)  RR: 17 (23 @ 05:20) (17 - 17)  SpO2: 96% (23 @ 05:20) (92% - 98%)  Wt(kg): --    PHYSICAL EXAM:  General: NAD  Respiratory: b/l air entry  Cardiovascular: S1 S2  Gastrointestinal: soft  Extremities: no edema            137  |  110<H>  |  24<H>  ----------------------------<  98  3.5   |  23  |  1.90<H>    Ca    9.9      2023 07:00    TPro  7.4  /  Alb  4.2  /  TBili  0.9  /  DBili  x   /  AST  36  /  ALT  48  /  AlkPhos  116                            11.3   10.63 )-----------( 164      ( 2023 08:15 )             34.4       Hemoglobin: 11.3 g/dL ( @ 08:15)  Hematocrit: 34.4 % ( @ 08:15)  Hemoglobin: 13.3 g/dL ( @ 07:00)  Hematocrit: 40.4 % ( @ 07:00)      Creatinine, Serum: 1.90 ( @ 07:00)      Urinalysis Basic - ( 2023 10:40 )    Color: Yellow / Appearance: Clear / S.010 / pH: x  Gluc: x / Ketone: Trace  / Bili: Negative / Urobili: Negative   Blood: x / Protein: 15 / Nitrite: Negative   Leuk Esterase: Negative / RBC: 0-2 /HPF / WBC 0-2   Sq Epi: x / Non Sq Epi: x / Bacteria: x      LIVER FUNCTIONS - ( 2023 07:00 )  Alb: 4.2 g/dL / Pro: 7.4 g/dL / ALK PHOS: 116 U/L / ALT: 48 U/L / AST: 36 U/L / GGT: x                       I&O's Detail    2023 07:01  -  2023 07:00  --------------------------------------------------------  IN:  Total IN: 0 mL    OUT:    Indwelling Catheter - Urethral (mL): 2800 mL  Total OUT: 2800 mL    Total NET: -2800 mL      < from: Xray Chest 1 View-PORTABLE IMMEDIATE (23 @ 07:59) >    ACC: [id number] EXAM:  XR CHEST PORTABLE IMMED 1V   ORDERED BY: SAE ANN     PROCEDURE DATE:  2023          INTERPRETATION:  TECHNIQUE: A single AP view of the chest was obtained.   Ordered time:   2023 7:59 AM    COMPARISON: None    CLINICAL INFORMATION: Sepsis    FINDINGS:    The heart is not enlarged.  The lungs are clear.  There are no pleural effusions.  There is no pneumothorax.    IMPRESSION:    No acute pulmonary disease    --- End of Report ---            GARRET GILLESPIE MD; Attending Radiologist  This document has been electronically signed. 2023  2:12PM    < end of copied text >

## 2023-04-09 NOTE — OCCUPATIONAL THERAPY INITIAL EVALUATION ADULT - GENERAL OBSERVATIONS, REHAB EVAL
Patient presented A&Ox0, restless and agitated while supine in bed, unable to follow simple commands.

## 2023-04-09 NOTE — CONSULT NOTE ADULT - ASSESSMENT
MICHELL: Prerenal azotemia  Elevated lithium level  Hypokalemia  Bipolar disorder    Repeat labs. Monitor creatinine trend. IV hydration. Potassium supplementation. Lithium levels improving.   Will need to attempt to stop lithium and use alternative meds because of side effects of long term use of lithium including but not limited to kidney disease and worsening renal function  upon discretion of out pt psych. Avoid nephrotoxic meds as possible. Will follow electrolytes and renal function trend. Psych follow up.     Further recommendations pending clinical course. Thank you for the courtesy of this referral.

## 2023-04-09 NOTE — OCCUPATIONAL THERAPY INITIAL EVALUATION ADULT - PERTINENT HX OF CURRENT PROBLEM, REHAB EVAL
68 y/o male PMH BPD (diagnosed 2005), CKD3, hypothyroidism Urinary retention and insomnia presents to the ED for altered mental status with increased weakness and decreased po intake. Patient unable to answer questions secondary to mental status. History obtained from patient's wife and son via telephone. Patient was recently admitted to [facility] for a manic episode ~3 weeks ago. There his dose of Bupropion was decreased 150mg TID to once daily, he also had a normal CT head and was diagnosed with CKD3.  Soon after discharge, patient began to have increased weakness, unsteady gait with multiple mechanical falls, slurred speech, and decreased po intake. These symptoms seemed to have a rapid onset ~10 days and have not improved. His son also notes, he seemed to have pain with swallowing as well.   Of note, patient's mom also had hx of Alzheimer's dementia and parkinson's disease which she was diagnosed with in her 70s and family is concerned he might be developing a neurological disorder.   At time of exam, patient unable to answer questions or follow commands.

## 2023-04-09 NOTE — OCCUPATIONAL THERAPY INITIAL EVALUATION ADULT - ADDITIONAL COMMENTS
Pt is very poor historian; as per EMR pt lives with wife and children and requires assist across all ADLs and functional t/fs.

## 2023-04-09 NOTE — DIETITIAN INITIAL EVALUATION ADULT - PROBLEM SELECTOR PLAN 1
AMS in the setting of progressive dementia vs. medications vs. electrolyte imbalance  - Patient w/ unsteady gait, multiple mechanical falls, decreased po intake, dysphagia  - Leukocytosis (12.30) of unclear etiology on admission  - CT head: Grossly, no acute hemorrhage mass or mass effect seen. Better  CT is recommended when   patient can tolerate it or sedation be given.  - UA: negative   - AAOx0 (person) on admission  - Fall, aspiration precautions  - Speech and Swallow consulted, appreciate recs. NPO except meds for now  - F/u UCx, BCx x2  - DAY TEAM TO CONSULT NEUROLOGY- unavailable over the weekend  - Psych (Dr. Floyd) consulted, f/u recs  - PT/OT, SW, nutrition consulted , f/u recs

## 2023-04-09 NOTE — DIETITIAN INITIAL EVALUATION ADULT - OTHER INFO
66 y/o male PMH BPD (diagnosed 2005), CKD3, hypothyroidism Urinary retention and insomnia presents to the ED for altered mental status with increased weakness and decreased po intake. Admit for AMS.    ( see above)

## 2023-04-09 NOTE — DIETITIAN INITIAL EVALUATION ADULT - PERTINENT LABORATORY DATA
04-08    137  |  110<H>  |  24<H>  ----------------------------<  98  3.5   |  23  |  1.90<H>    Ca    9.9      08 Apr 2023 07:00    TPro  7.4  /  Alb  4.2  /  TBili  0.9  /  DBili  x   /  AST  36  /  ALT  48  /  AlkPhos  116  04-08

## 2023-04-09 NOTE — PROVIDER CONTACT NOTE (CRITICAL VALUE NOTIFICATION) - ACTION/TREATMENT ORDERED:
Md Ferrara on unit and made aware. No intervention at this time. Continue to monitor.
Increase fluid rate

## 2023-04-09 NOTE — DIETITIAN INITIAL EVALUATION ADULT - PERTINENT MEDS FT
MEDICATIONS  (STANDING):  heparin   Injectable 5000 Unit(s) SubCutaneous every 8 hours  levothyroxine Injectable 12.5 MICROGram(s) IV Push at bedtime  sodium chloride 0.9%. 1000 milliLiter(s) (150 mL/Hr) IV Continuous <Continuous>  tamsulosin 0.4 milliGRAM(s) Oral at bedtime    MEDICATIONS  (PRN):  haloperidol    Injectable 1 milliGRAM(s) IntraMuscular every 6 hours PRN Agitation  ondansetron Injectable 4 milliGRAM(s) IV Push every 8 hours PRN Nausea and/or Vomiting

## 2023-04-09 NOTE — PROGRESS NOTE ADULT - PROBLEM SELECTOR PLAN 2
Chronic, diagnosed 2005.  - Recent admission to [facility] for manic episode, Bupropion decreased from 150mg TID to once a day  - Continue bupropion XL 150mg daily  - Hold lithium 2/2 litihium toxicity  - Hold lithium 300mg AM daily and 600mg at bedtime daily  - Previously following with Psych NP: Concha Amaro, RICHARDSON, now has appointment with [facility] Psych  - Psych (Dr. Floyd) consulted, appreciate recs

## 2023-04-09 NOTE — PROGRESS NOTE ADULT - SUBJECTIVE AND OBJECTIVE BOX
Patient is a 67y old  Male who presents with a chief complaint of AMS (2023 10:15)      INTERVAL HPI/OVERNIGHT EVENTS: Patient seen and examined. NAD. Confused. Agitated.    Vital Signs Last 24 Hrs  T(C): 36.9 (2023 05:20), Max: 37.2 (2023 15:48)  T(F): 98.4 (2023 05:20), Max: 98.9 (2023 15:48)  HR: 89 (2023 05:20) (83 - 91)  BP: 139/67 (2023 05:20) (133/95 - 144/67)  BP(mean): --  RR: 17 (2023 05:20) (17 - 17)  SpO2: 96% (2023 05:20) (92% - 98%)    Parameters below as of 2023 05:20  Patient On (Oxygen Delivery Method): room air            147<H>  |  120<H>  |  15  ----------------------------<  80  3.3<L>   |  21<L>  |  1.30    Ca    8.9      2023 08:15    TPro  6.0  /  Alb  3.4  /  TBili  0.8  /  DBili  x   /  AST  28  /  ALT  33  /  AlkPhos  103                            11.3   10.63 )-----------( 164      ( 2023 08:15 )             34.4     PT/INR - ( 2023 07:00 )   PT: 13.9 sec;   INR: 1.19 ratio         PTT - ( 2023 07:00 )  PTT:28.9 sec  CAPILLARY BLOOD GLUCOSE        Urinalysis Basic - ( 2023 10:40 )    Color: Yellow / Appearance: Clear / S.010 / pH: x  Gluc: x / Ketone: Trace  / Bili: Negative / Urobili: Negative   Blood: x / Protein: 15 / Nitrite: Negative   Leuk Esterase: Negative / RBC: 0-2 /HPF / WBC 0-2   Sq Epi: x / Non Sq Epi: x / Bacteria: x    Culture - Urine (23 @ 10:40)   Specimen Source: Clean Catch Clean Catch (Midstream)  Culture Results:   No growthCulture - Blood (23 @ 07:00)   Specimen Source: .Blood Blood-Peripheral  Culture Results:   No growth to date.      Historical Values  Culture - Blood (23 @ 07:00)   Specimen Source: .Blood Blood-Peripheral  Culture Results:   No growth to date.  Culture - Blood (23 @ 07:00)   Specimen Source: .Blood Blood-Peripheral  Culture Results:   No growth to date.          haloperidol    Injectable 1 milliGRAM(s) IntraMuscular every 6 hours PRN  heparin   Injectable 5000 Unit(s) SubCutaneous every 8 hours  levothyroxine Injectable 12.5 MICROGram(s) IV Push at bedtime  ondansetron Injectable 4 milliGRAM(s) IV Push every 8 hours PRN  sodium chloride 0.9%. 1000 milliLiter(s) IV Continuous <Continuous>  tamsulosin 0.4 milliGRAM(s) Oral at bedtime              REVIEW OF SYSTEMS:  CONSTITUTIONAL: No fever, no weight loss, or no fatigue  NECK: No pain, no stiffness  RESPIRATORY: No cough, no wheezing, no chills, no hemoptysis, No shortness of breath  CARDIOVASCULAR: No chest pain, no palpitations, no dizziness, no leg swelling  GASTROINTESTINAL: No abdominal pain. No nausea, no vomiting, no hematemesis; No diarrhea, no constipation. No melena, no hematochezia.  GENITOURINARY: No dysuria, no frequency, no hematuria, no incontinence  NEUROLOGICAL: No headaches, no loss of strength, no numbness, no tremors  SKIN: No itching, no burning  MUSCULOSKELETAL: No joint pain, no swelling; No muscle, no back, no extremity pain  PSYCHIATRIC: No depression, no mood swings,   HEME/LYMPH: No easy bruising, no bleeding gums  ALLERY AND IMMUNOLOGIC: No hives       Consultant(s) Notes Reviewed:  [X] YES  [ ] NO    PHYSICAL EXAM:  GENERAL: NAD  HEAD:  Atraumatic, Normocephalic  EYES: EOMI, PERRLA, conjunctiva and sclera clear  ENMT: No tonsillar erythema, exudates, or enlargement; Moist mucous membranes  NECK: Supple, No JVD  NERVOUS SYSTEM:  Awake & alert  CHEST/LUNG: Clear to auscultation bilaterally; No rales, rhonchi, wheezing,  HEART: Regular rate and rhythm  ABDOMEN: Soft, Nontender, Nondistended; Bowel sounds present  EXTREMITIES:  No clubbing, cyanosis, or edema  LYMPH: No lymphadenopathy noted  SKIN: No rashes      Advanced care planning discussed with patient/family [X] YES   [ ] NO    Advanced care planning discussed with patient/family. Patient's health status was discussed. All appropriate changes have been made regarding patient's end-of-life care. Advanced care planning forms reviewed/discussed/completed.  20 minutes spent.

## 2023-04-09 NOTE — DIETITIAN INITIAL EVALUATION ADULT - ENERGY INTAKE
68 y/o male PMH BPD (diagnosed 2005), CKD3, hypothyroidism Urinary retention and insomnia presents to the ED for altered mental status with increased weakness and decreased po intake. Admit for AMS.            Problem/Plan - 1:  ·  Problem: Altered mental status.   ·  Plan: AMS in the setting of progressive dementia vs. medications vs. electrolyte imbalance  - Patient w/ unsteady gait, multiple mechanical falls, decreased po intake, dysphagia  - Leukocytosis (12.30) of unclear etiology on admission  - CT head: Grossly, no acute hemorrhage mass or mass effect seen. Better  CT is recommended when   patient can tolerate it or sedation be given.  - UA: negative   - AAOx0 (person) on admission  - Fall, aspiration precautions  - Speech and Swallow consulted, appreciate recs. NPO except meds for now  - F/u UCx, BCx x2  - DAY TEAM TO CONSULT NEUROLOGY- unavailable over the weekend  - Psych (Dr. Floyd) consulted, f/u recs  - PT/OT, SW, nutrition consulted , f/u recs.     Problem/Plan - 2:  ·  Problem: Bipolar disorder.   ·  Plan: Chronic, diagnosed 2005.  - Recent admission to [facility] for manic episode, Bupropion decreased from 150mg TID to once a day  - Continue bupropion XL 150mg daily  - Continue lithium 300mg AM daily and 600mg at bedtime daily  - Previously following with Psych NP: RICHARDSON Saxena, now has appointment with [facility] Psych  - Psych (Dr. Floyd) consulted, appreciate recs.     Problem/Plan - 3:  ·  Problem: Urinary retention.   ·  Plan: Chronic   - Duenas cath placed in ED  - Output 800 cc urine per RN  - Continue home tamsulosin 0.4mg BID.     Problem/Plan - 4:  ·  Problem: Stage 3 chronic kidney disease.   ·  Plan: Patient recently diagnosed w/ CKD3 at [facility]  - Cr. 1.9 on admission, baseline unknown  - Continue to monitor with daily BMP  - Avoid nephrotoxic agen 68 y/o male PMH BPD (diagnosed 2005), CKD3, hypothyroidism Urinary retention and insomnia presents to the ED for altered mental status with increased weakness and decreased po intake. Admit for AMS, unable to answer questions.  Recent admission to [facility] for manic episodes 3 weeks ago   Soon after discharge, patient began to have increased weakness, unsteady gait with multiple mechanical falls, slurred speech, and decreased po intake. These symptoms seemed to have a rapid onset ~10 days and have not improved. His son also notes, he seemed to have pain with swallowing as well.   Of note, patient's mom also had hx of Alzheimer's dementia and parkinson's disease which she was diagnosed with in her 70s and family is concerned he might be developing a neurological disorder. NPO

## 2023-04-09 NOTE — CONSULT NOTE ADULT - SUBJECTIVE AND OBJECTIVE BOX
Altered in mental status,   For Altered in mental status and lethargy, suspect most likely this is metabolic encephalopathy, which is multifactorial secondary to possible underlying infection type process along with a slight increase lithium levels ? psych issues unclear  what baseline mental status is   wbc noted sepsis work up as needed.  monitor renal function  psych follow up  will check basic labs  called both son and spouse went to voicemail

## 2023-04-09 NOTE — DIETITIAN INITIAL EVALUATION ADULT - ORAL INTAKE PTA/DIET HISTORY
pt unable to provide. called wife number in contacts and received message that phone not in service. Will continue to attempt contact with family

## 2023-04-09 NOTE — DIETITIAN INITIAL EVALUATION ADULT - PROBLEM SELECTOR PLAN 2
Chronic, diagnosed 2005.  - Recent admission to [facility] for manic episode, Bupropion decreased from 150mg TID to once a day  - Continue bupropion XL 150mg daily  - Continue lithium 300mg AM daily and 600mg at bedtime daily  - Previously following with Psych NP: Concha Amaro, CHRISTINAP, now has appointment with [facility] Psych  - Psych (Dr. Floyd) consulted, appreciate recs

## 2023-04-09 NOTE — PROGRESS NOTE ADULT - PROBLEM SELECTOR PLAN 5
Patient recently diagnosed w/ CKD3 at [facility]  - Cr. 1.9 on admission, baseline unknown  - Continue to monitor with daily BMP  - Avoid nephrotoxic agents.  - Renal f/u

## 2023-04-09 NOTE — DIETITIAN INITIAL EVALUATION ADULT - PROBLEM SELECTOR PLAN 4
Patient recently diagnosed w/ CKD3 at [facility]  - Cr. 1.9 on admission, baseline unknown  - Continue to monitor with daily BMP  - Avoid nephrotoxic agents.

## 2023-04-10 DIAGNOSIS — R50.9 FEVER, UNSPECIFIED: ICD-10-CM

## 2023-04-10 LAB
ALBUMIN SERPL ELPH-MCNC: 3.7 G/DL — SIGNIFICANT CHANGE UP (ref 3.3–5)
ALP SERPL-CCNC: 108 U/L — SIGNIFICANT CHANGE UP (ref 40–120)
ALT FLD-CCNC: 40 U/L — SIGNIFICANT CHANGE UP (ref 12–78)
AMMONIA BLD-MCNC: 25 UMOL/L — SIGNIFICANT CHANGE UP (ref 11–32)
AMMONIA BLD-MCNC: 40 UMOL/L — HIGH (ref 11–32)
ANION GAP SERPL CALC-SCNC: 3 MMOL/L — LOW (ref 5–17)
ANION GAP SERPL CALC-SCNC: 6 MMOL/L — SIGNIFICANT CHANGE UP (ref 5–17)
AST SERPL-CCNC: 63 U/L — HIGH (ref 15–37)
BASE EXCESS BLDA CALC-SCNC: -3.9 MMOL/L — LOW (ref -2–3)
BASOPHILS # BLD AUTO: 0.07 K/UL — SIGNIFICANT CHANGE UP (ref 0–0.2)
BASOPHILS NFR BLD AUTO: 0.5 % — SIGNIFICANT CHANGE UP (ref 0–2)
BILIRUB SERPL-MCNC: 0.7 MG/DL — SIGNIFICANT CHANGE UP (ref 0.2–1.2)
BLOOD GAS COMMENTS ARTERIAL: SIGNIFICANT CHANGE UP
BUN SERPL-MCNC: 14 MG/DL — SIGNIFICANT CHANGE UP (ref 7–23)
BUN SERPL-MCNC: 15 MG/DL — SIGNIFICANT CHANGE UP (ref 7–23)
CALCIUM SERPL-MCNC: 9.2 MG/DL — SIGNIFICANT CHANGE UP (ref 8.5–10.1)
CALCIUM SERPL-MCNC: 9.9 MG/DL — SIGNIFICANT CHANGE UP (ref 8.5–10.1)
CHLORIDE SERPL-SCNC: 121 MMOL/L — HIGH (ref 96–108)
CHLORIDE SERPL-SCNC: 122 MMOL/L — HIGH (ref 96–108)
CK MB BLD-MCNC: 0.7 % — SIGNIFICANT CHANGE UP (ref 0–3.5)
CK MB BLD-MCNC: 0.7 % — SIGNIFICANT CHANGE UP (ref 0–3.5)
CK MB CFR SERPL CALC: 13.8 NG/ML — HIGH (ref 0–3.6)
CK MB CFR SERPL CALC: 9.4 NG/ML — HIGH (ref 0–3.6)
CK SERPL-CCNC: 1374 U/L — HIGH (ref 26–308)
CK SERPL-CCNC: 1922 U/L — HIGH (ref 26–308)
CO2 SERPL-SCNC: 22 MMOL/L — SIGNIFICANT CHANGE UP (ref 22–31)
CO2 SERPL-SCNC: 23 MMOL/L — SIGNIFICANT CHANGE UP (ref 22–31)
CREAT SERPL-MCNC: 1.5 MG/DL — HIGH (ref 0.5–1.3)
CREAT SERPL-MCNC: 1.6 MG/DL — HIGH (ref 0.5–1.3)
EGFR: 46 ML/MIN/1.73M2 — LOW
EGFR: 50 ML/MIN/1.73M2 — LOW
EOSINOPHIL # BLD AUTO: 0.02 K/UL — SIGNIFICANT CHANGE UP (ref 0–0.5)
EOSINOPHIL NFR BLD AUTO: 0.2 % — SIGNIFICANT CHANGE UP (ref 0–6)
FOLATE SERPL-MCNC: 19.1 NG/ML — SIGNIFICANT CHANGE UP
GAS PNL BLDA: SIGNIFICANT CHANGE UP
GLUCOSE SERPL-MCNC: 121 MG/DL — HIGH (ref 70–99)
GLUCOSE SERPL-MCNC: 124 MG/DL — HIGH (ref 70–99)
HCO3 BLDA-SCNC: 21 MMOL/L — SIGNIFICANT CHANGE UP (ref 21–28)
HCT VFR BLD CALC: 36.2 % — LOW (ref 39–50)
HCT VFR BLD CALC: 39.6 % — SIGNIFICANT CHANGE UP (ref 39–50)
HGB BLD-MCNC: 11.7 G/DL — LOW (ref 13–17)
HGB BLD-MCNC: 12.6 G/DL — LOW (ref 13–17)
HOROWITZ INDEX BLDA+IHG-RTO: 24 — SIGNIFICANT CHANGE UP
IMM GRANULOCYTES NFR BLD AUTO: 0.2 % — SIGNIFICANT CHANGE UP (ref 0–0.9)
LACTATE SERPL-SCNC: 1.2 MMOL/L — SIGNIFICANT CHANGE UP (ref 0.7–2)
LITHIUM SERPL-MCNC: 0.9 MMOL/L — SIGNIFICANT CHANGE UP (ref 0.6–1.2)
LITHIUM SERPL-MCNC: 1 MMOL/L — SIGNIFICANT CHANGE UP (ref 0.6–1.2)
LYMPHOCYTES # BLD AUTO: 1.11 K/UL — SIGNIFICANT CHANGE UP (ref 1–3.3)
LYMPHOCYTES # BLD AUTO: 8.4 % — LOW (ref 13–44)
MAGNESIUM SERPL-MCNC: 2 MG/DL — SIGNIFICANT CHANGE UP (ref 1.6–2.6)
MCHC RBC-ENTMCNC: 29.9 PG — SIGNIFICANT CHANGE UP (ref 27–34)
MCHC RBC-ENTMCNC: 30 PG — SIGNIFICANT CHANGE UP (ref 27–34)
MCHC RBC-ENTMCNC: 31.8 GM/DL — LOW (ref 32–36)
MCHC RBC-ENTMCNC: 32.3 GM/DL — SIGNIFICANT CHANGE UP (ref 32–36)
MCV RBC AUTO: 92.8 FL — SIGNIFICANT CHANGE UP (ref 80–100)
MCV RBC AUTO: 94.1 FL — SIGNIFICANT CHANGE UP (ref 80–100)
MONOCYTES # BLD AUTO: 1.33 K/UL — HIGH (ref 0–0.9)
MONOCYTES NFR BLD AUTO: 10.1 % — SIGNIFICANT CHANGE UP (ref 2–14)
NEUTROPHILS # BLD AUTO: 10.6 K/UL — HIGH (ref 1.8–7.4)
NEUTROPHILS NFR BLD AUTO: 80.6 % — HIGH (ref 43–77)
NRBC # BLD: 0 /100 WBCS — SIGNIFICANT CHANGE UP (ref 0–0)
NRBC # BLD: 0 /100 WBCS — SIGNIFICANT CHANGE UP (ref 0–0)
PCO2 BLDA: 37 MMHG — SIGNIFICANT CHANGE UP (ref 35–48)
PH BLDA: 7.37 — SIGNIFICANT CHANGE UP (ref 7.35–7.45)
PHOSPHATE SERPL-MCNC: 2.6 MG/DL — SIGNIFICANT CHANGE UP (ref 2.5–4.5)
PLATELET # BLD AUTO: 175 K/UL — SIGNIFICANT CHANGE UP (ref 150–400)
PLATELET # BLD AUTO: 208 K/UL — SIGNIFICANT CHANGE UP (ref 150–400)
PO2 BLDA: 90 MMHG — SIGNIFICANT CHANGE UP (ref 83–108)
POTASSIUM SERPL-MCNC: 3.6 MMOL/L — SIGNIFICANT CHANGE UP (ref 3.5–5.3)
POTASSIUM SERPL-MCNC: 4 MMOL/L — SIGNIFICANT CHANGE UP (ref 3.5–5.3)
POTASSIUM SERPL-SCNC: 3.6 MMOL/L — SIGNIFICANT CHANGE UP (ref 3.5–5.3)
POTASSIUM SERPL-SCNC: 4 MMOL/L — SIGNIFICANT CHANGE UP (ref 3.5–5.3)
PROCALCITONIN SERPL-MCNC: 0.06 NG/ML — SIGNIFICANT CHANGE UP
PROT SERPL-MCNC: 6.6 G/DL — SIGNIFICANT CHANGE UP (ref 6–8.3)
RBC # BLD: 3.9 M/UL — LOW (ref 4.2–5.8)
RBC # BLD: 4.21 M/UL — SIGNIFICANT CHANGE UP (ref 4.2–5.8)
RBC # FLD: 12.8 % — SIGNIFICANT CHANGE UP (ref 10.3–14.5)
RBC # FLD: 13 % — SIGNIFICANT CHANGE UP (ref 10.3–14.5)
SAO2 % BLDA: 99.5 % — HIGH (ref 94–98)
SODIUM SERPL-SCNC: 148 MMOL/L — HIGH (ref 135–145)
SODIUM SERPL-SCNC: 149 MMOL/L — HIGH (ref 135–145)
TROPONIN I, HIGH SENSITIVITY RESULT: 49.4 NG/L — SIGNIFICANT CHANGE UP
TSH SERPL-MCNC: 2.19 UIU/ML — SIGNIFICANT CHANGE UP (ref 0.36–3.74)
VIT B12 SERPL-MCNC: 692 PG/ML — SIGNIFICANT CHANGE UP (ref 232–1245)
WBC # BLD: 13.16 K/UL — HIGH (ref 3.8–10.5)
WBC # BLD: 15.99 K/UL — HIGH (ref 3.8–10.5)
WBC # FLD AUTO: 13.16 K/UL — HIGH (ref 3.8–10.5)
WBC # FLD AUTO: 15.99 K/UL — HIGH (ref 3.8–10.5)

## 2023-04-10 PROCEDURE — 93010 ELECTROCARDIOGRAM REPORT: CPT

## 2023-04-10 PROCEDURE — 99291 CRITICAL CARE FIRST HOUR: CPT | Mod: GC

## 2023-04-10 PROCEDURE — 93010 ELECTROCARDIOGRAM REPORT: CPT | Mod: 77

## 2023-04-10 RX ORDER — PIPERACILLIN AND TAZOBACTAM 4; .5 G/20ML; G/20ML
3.38 INJECTION, POWDER, LYOPHILIZED, FOR SOLUTION INTRAVENOUS EVERY 8 HOURS
Refills: 0 | Status: DISCONTINUED | OUTPATIENT
Start: 2023-04-10 | End: 2023-04-11

## 2023-04-10 RX ORDER — DIPHENHYDRAMINE HCL 50 MG
25 CAPSULE ORAL ONCE
Refills: 0 | Status: COMPLETED | OUTPATIENT
Start: 2023-04-10 | End: 2023-04-10

## 2023-04-10 RX ORDER — DEXMEDETOMIDINE HYDROCHLORIDE IN 0.9% SODIUM CHLORIDE 4 UG/ML
0.1 INJECTION INTRAVENOUS
Qty: 200 | Refills: 0 | Status: DISCONTINUED | OUTPATIENT
Start: 2023-04-10 | End: 2023-04-10

## 2023-04-10 RX ORDER — PIPERACILLIN AND TAZOBACTAM 4; .5 G/20ML; G/20ML
3.38 INJECTION, POWDER, LYOPHILIZED, FOR SOLUTION INTRAVENOUS ONCE
Refills: 0 | Status: COMPLETED | OUTPATIENT
Start: 2023-04-10 | End: 2023-04-10

## 2023-04-10 RX ORDER — HALOPERIDOL DECANOATE 100 MG/ML
2.5 INJECTION INTRAMUSCULAR ONCE
Refills: 0 | Status: COMPLETED | OUTPATIENT
Start: 2023-04-10 | End: 2023-04-10

## 2023-04-10 RX ORDER — HALOPERIDOL DECANOATE 100 MG/ML
1 INJECTION INTRAMUSCULAR ONCE
Refills: 0 | Status: COMPLETED | OUTPATIENT
Start: 2023-04-10 | End: 2023-04-10

## 2023-04-10 RX ORDER — DEXMEDETOMIDINE HYDROCHLORIDE IN 0.9% SODIUM CHLORIDE 4 UG/ML
0.1 INJECTION INTRAVENOUS
Qty: 400 | Refills: 0 | Status: DISCONTINUED | OUTPATIENT
Start: 2023-04-10 | End: 2023-04-14

## 2023-04-10 RX ORDER — SODIUM CHLORIDE 9 MG/ML
1000 INJECTION, SOLUTION INTRAVENOUS
Refills: 0 | Status: DISCONTINUED | OUTPATIENT
Start: 2023-04-10 | End: 2023-04-15

## 2023-04-10 RX ADMIN — DEXMEDETOMIDINE HYDROCHLORIDE IN 0.9% SODIUM CHLORIDE 2.04 MICROGRAM(S)/KG/HR: 4 INJECTION INTRAVENOUS at 23:22

## 2023-04-10 RX ADMIN — SODIUM CHLORIDE 80 MILLILITER(S): 9 INJECTION, SOLUTION INTRAVENOUS at 14:40

## 2023-04-10 RX ADMIN — PIPERACILLIN AND TAZOBACTAM 25 GRAM(S): 4; .5 INJECTION, POWDER, LYOPHILIZED, FOR SOLUTION INTRAVENOUS at 08:44

## 2023-04-10 RX ADMIN — Medication 12.5 MICROGRAM(S): at 21:12

## 2023-04-10 RX ADMIN — HEPARIN SODIUM 5000 UNIT(S): 5000 INJECTION INTRAVENOUS; SUBCUTANEOUS at 05:10

## 2023-04-10 RX ADMIN — HALOPERIDOL DECANOATE 1 MILLIGRAM(S): 100 INJECTION INTRAMUSCULAR at 18:39

## 2023-04-10 RX ADMIN — HALOPERIDOL DECANOATE 1 MILLIGRAM(S): 100 INJECTION INTRAMUSCULAR at 00:40

## 2023-04-10 RX ADMIN — HALOPERIDOL DECANOATE 2.5 MILLIGRAM(S): 100 INJECTION INTRAMUSCULAR at 00:58

## 2023-04-10 RX ADMIN — DEXMEDETOMIDINE HYDROCHLORIDE IN 0.9% SODIUM CHLORIDE 2.04 MICROGRAM(S)/KG/HR: 4 INJECTION INTRAVENOUS at 02:23

## 2023-04-10 RX ADMIN — HEPARIN SODIUM 5000 UNIT(S): 5000 INJECTION INTRAVENOUS; SUBCUTANEOUS at 21:12

## 2023-04-10 RX ADMIN — HALOPERIDOL DECANOATE 1 MILLIGRAM(S): 100 INJECTION INTRAMUSCULAR at 13:00

## 2023-04-10 RX ADMIN — SODIUM CHLORIDE 80 MILLILITER(S): 9 INJECTION, SOLUTION INTRAVENOUS at 02:23

## 2023-04-10 RX ADMIN — PIPERACILLIN AND TAZOBACTAM 25 GRAM(S): 4; .5 INJECTION, POWDER, LYOPHILIZED, FOR SOLUTION INTRAVENOUS at 21:10

## 2023-04-10 RX ADMIN — DEXMEDETOMIDINE HYDROCHLORIDE IN 0.9% SODIUM CHLORIDE 2.04 MICROGRAM(S)/KG/HR: 4 INJECTION INTRAVENOUS at 19:05

## 2023-04-10 RX ADMIN — Medication 25 MILLIGRAM(S): at 00:41

## 2023-04-10 RX ADMIN — PIPERACILLIN AND TAZOBACTAM 25 GRAM(S): 4; .5 INJECTION, POWDER, LYOPHILIZED, FOR SOLUTION INTRAVENOUS at 14:36

## 2023-04-10 RX ADMIN — SODIUM CHLORIDE 80 MILLILITER(S): 9 INJECTION, SOLUTION INTRAVENOUS at 23:22

## 2023-04-10 RX ADMIN — HALOPERIDOL DECANOATE 1 MILLIGRAM(S): 100 INJECTION INTRAMUSCULAR at 08:04

## 2023-04-10 RX ADMIN — HEPARIN SODIUM 5000 UNIT(S): 5000 INJECTION INTRAVENOUS; SUBCUTANEOUS at 14:36

## 2023-04-10 RX ADMIN — DEXMEDETOMIDINE HYDROCHLORIDE IN 0.9% SODIUM CHLORIDE 2.04 MICROGRAM(S)/KG/HR: 4 INJECTION INTRAVENOUS at 05:48

## 2023-04-10 RX ADMIN — Medication 25 MILLIGRAM(S): at 00:11

## 2023-04-10 RX ADMIN — PIPERACILLIN AND TAZOBACTAM 200 GRAM(S): 4; .5 INJECTION, POWDER, LYOPHILIZED, FOR SOLUTION INTRAVENOUS at 06:06

## 2023-04-10 NOTE — CARE COORDINATION ASSESSMENT. - LIVING ARRANGEMENTS, PROFILE
Pt has 4 stairs to enter home. Pt has 13 stairs leading to basement. Pt spouse reports pt having difficulty with stairs and is not able to get up/down stairs alone. Pt resides on main level and bathroom is on main level. No stairs required once inside home./house

## 2023-04-10 NOTE — CARE COORDINATION ASSESSMENT. - NSDCPLANREVIEW_GEN_ALL_CORE
discussed plan with spouse. SW to follow for more accurate d/c plan once advised by SLIM PEÑA./Family

## 2023-04-10 NOTE — CARE COORDINATION ASSESSMENT. - REASON FOR CONSULT
Spoke with spouse regarding consult for assisting with pt needs at home. SW discussed [facility] pending recommendation from PT as spouse reports that pt has had several falls at home. Pt at [facility] a few years ago, will follow up with PCP to further discuss and explore choices if needed. SW to provide d/c planning folder to pt at bedside. Spouse provided SW contact information to follow up regarding d/c plan and coordination if needed./coordination of care/discharge planning/length of stay

## 2023-04-10 NOTE — PROGRESS NOTE ADULT - SUBJECTIVE AND OBJECTIVE BOX
Patient is a 67y old  Male who presents with a chief complaint of AMS (10 Apr 2023 14:08)    Patient seen in follow up for MICHELL, elevated lithium level.        PAST MEDICAL HISTORY:  Bipolar disorder    Stage 3 chronic kidney disease    Hard of hearing    Hypothyroidism    Urinary retention    Insomnia      MEDICATIONS  (STANDING):  dexMEDEtomidine Infusion 0.1 MICROgram(s)/kG/Hr (2.04 mL/Hr) IV Continuous <Continuous>  dextrose 5%. 1000 milliLiter(s) (80 mL/Hr) IV Continuous <Continuous>  heparin   Injectable 5000 Unit(s) SubCutaneous every 8 hours  levothyroxine Injectable 12.5 MICROGram(s) IV Push at bedtime  piperacillin/tazobactam IVPB.. 3.375 Gram(s) IV Intermittent every 8 hours    MEDICATIONS  (PRN):  haloperidol    Injectable 1 milliGRAM(s) IntraMuscular every 6 hours PRN Agitation  ondansetron Injectable 4 milliGRAM(s) IV Push every 8 hours PRN Nausea and/or Vomiting    T(C): 37 (04-10-23 @ 12:04), Max: 38.3 (04-10-23 @ 02:37)  HR: 49 (04-10-23 @ 14:30) (48 - 105)  BP: 97/55 (04-10-23 @ 14:30) (93/50 - 172/76)  RR: 34 (04-10-23 @ 14:30)  SpO2: 98% (04-10-23 @ 14:30)  Wt(kg): --  I&O's Detail    09 Apr 2023 07:01  -  10 Apr 2023 07:00  --------------------------------------------------------  IN:    Dexmedetomidine: 2 mL    dextrose 5% + sodium chloride 0.45%: 400 mL  Total IN: 402 mL    OUT:    Indwelling Catheter - Urethral (mL): 2320 mL  Total OUT: 2320 mL    Total NET: -1918 mL      10 Apr 2023 07:01  -  10 Apr 2023 14:53  --------------------------------------------------------  IN:    Dexmedetomidine: 30.2 mL    dextrose 5% + sodium chloride 0.45%: 480 mL  Total IN: 510.2 mL    OUT:    Indwelling Catheter - Urethral (mL): 170 mL  Total OUT: 170 mL    Total NET: 340.2 mL            PHYSICAL EXAM:  General: No distress  Respiratory: b/l air entry  Cardiovascular: S1 S2  Gastrointestinal: soft  Extremities: no edema                         LABORATORY:                        12.6   15.99 )-----------( 208      ( 10 Apr 2023 08:30 )             39.6     04-10    149<H>  |  121<H>  |  15  ----------------------------<  124<H>  4.0   |  22  |  1.60<H>    Ca    9.9      10 Apr 2023 08:30  Phos  2.6     04-10  Mg     2.0     04-10    TPro  6.6  /  Alb  3.7  /  TBili  0.7  /  DBili  x   /  AST  63<H>  /  ALT  40  /  AlkPhos  108  04-10    Sodium, Serum: 149 mmol/L (04-10 @ 08:30)  Sodium, Serum: 148 mmol/L (04-10 @ 02:15)  Sodium, Serum: 147 mmol/L (04-09 @ 08:15)    Potassium, Serum: 4.0 mmol/L (04-10 @ 08:30)  Potassium, Serum: 3.6 mmol/L (04-10 @ 02:15)  Potassium, Serum: 3.3 mmol/L (04-09 @ 08:15)    Hemoglobin: 12.6 g/dL (04-10 @ 08:30)  Hemoglobin: 11.7 g/dL (04-10 @ 02:15)  Hemoglobin: 11.3 g/dL (04-09 @ 08:15)  Hemoglobin: 13.3 g/dL (04-08 @ 07:00)    Creatinine, Serum 1.60 (04-10 @ 08:30)  Creatinine, Serum 1.50 (04-10 @ 02:15)  Creatinine, Serum 1.30 (04-09 @ 08:15)  Creatinine, Serum 1.90 (04-08 @ 07:00)    CARDIAC MARKERS ( 10 Apr 2023 12:40 )  x     / x     / 1374 U/L / x     / 9.4 ng/mL  CARDIAC MARKERS ( 10 Apr 2023 02:15 )  x     / x     / 1922 U/L / x     / 13.8 ng/mL      LIVER FUNCTIONS - ( 10 Apr 2023 02:15 )  Alb: 3.7 g/dL / Pro: 6.6 g/dL / ALK PHOS: 108 U/L / ALT: 40 U/L / AST: 63 U/L / GGT: x             ABG - ( 10 Apr 2023 02:07 )  pH, Arterial: 7.37  pH, Blood: x     /  pCO2: 37    /  pO2: 90    / HCO3: 21    / Base Excess: -3.9  /  SaO2: 99.5

## 2023-04-10 NOTE — CARE COORDINATION ASSESSMENT. - TRANSPORTATION UTILIZED PRIOR TO ADMISSION
Pt has 3 adult children, 2 children [place] and assist as needed. Pt resides with spouse./family/friend provides transportation

## 2023-04-10 NOTE — PROGRESS NOTE ADULT - SUBJECTIVE AND OBJECTIVE BOX
Patient is a 67y old  Male who presents with a chief complaint of AMS (10 Apr 2023 05:03)      INTERVAL HPI/OVERNIGHT EVENTS: Events noted.    Vital Signs Last 24 Hrs  T(C): 37 (10 Apr 2023 12:04), Max: 38.3 (10 Apr 2023 02:37)  T(F): 98.6 (10 Apr 2023 12:04), Max: 100.9 (10 Apr 2023 02:37)  HR: 50 (10 Apr 2023 13:15) (50 - 105)  BP: 126/59 (10 Apr 2023 13:15) (93/50 - 172/76)  BP(mean): 85 (10 Apr 2023 13:15) (65 - 115)  RR: 34 (10 Apr 2023 13:15) (16 - 45)  SpO2: 98% (10 Apr 2023 13:15) (91% - 100%)    Parameters below as of 10 Apr 2023 00:50  Patient On (Oxygen Delivery Method): room air        04-10    149<H>  |  121<H>  |  15  ----------------------------<  124<H>  4.0   |  22  |  1.60<H>    Ca    9.9      10 Apr 2023 08:30  Phos  2.6     04-10  Mg     2.0     04-10    TPro  6.6  /  Alb  3.7  /  TBili  0.7  /  DBili  x   /  AST  63<H>  /  ALT  40  /  AlkPhos  108  04-10                          12.6   15.99 )-----------( 208      ( 10 Apr 2023 08:30 )             39.6       CAPILLARY BLOOD GLUCOSE                  dexMEDEtomidine Infusion 0.1 MICROgram(s)/kG/Hr IV Continuous <Continuous>  dextrose 5% + sodium chloride 0.45%. 1000 milliLiter(s) IV Continuous <Continuous>  haloperidol    Injectable 1 milliGRAM(s) IntraMuscular every 6 hours PRN  heparin   Injectable 5000 Unit(s) SubCutaneous every 8 hours  levothyroxine Injectable 12.5 MICROGram(s) IV Push at bedtime  ondansetron Injectable 4 milliGRAM(s) IV Push every 8 hours PRN  piperacillin/tazobactam IVPB.- 3.375 Gram(s) IV Intermittent once  piperacillin/tazobactam IVPB.. 3.375 Gram(s) IV Intermittent every 8 hours        REVIEW OF SYSTEMS: unable      Consultant(s) Notes Reviewed:  [X] YES  [ ] NO    PHYSICAL EXAM:  GENERAL: NAD  HEAD:  Atraumatic, Normocephalic  EYES: EOMI, PERRLA, conjunctiva and sclera clear  ENMT: No tonsillar erythema, exudates, or enlargement; Moist mucous membranes  NECK: Supple, No JVD  NERVOUS SYSTEM:  agitated  CHEST/LUNG: Clear to auscultation bilaterally; No rales, rhonchi, wheezing,  HEART: Regular rate and rhythm  ABDOMEN: Soft, Nontender, Nondistended; Bowel sounds present  EXTREMITIES:  No clubbing, cyanosis, or edema  LYMPH: No lymphadenopathy noted  SKIN: No rashes      Advanced care planning discussed with patient/family [X] YES   [ ] NO    Advanced care planning discussed with patient/family. Patient's health status was discussed. All appropriate changes have been made regarding patient's end-of-life care. Advanced care planning forms reviewed/discussed/completed.  20 minutes spent.

## 2023-04-10 NOTE — PROGRESS NOTE ADULT - ASSESSMENT
68 y/o male PMH BPD (diagnosed 2005), CKD3, hypothyroidism Urinary retention and insomnia    Neuro: AMS/ A O x 0 since admission,   overnight code grey for severe agitation Placed on precedex gtt. Original CT head negative for acute process.  Suspect lithium toxicity vs encephalopathy vs seizures  Lithium levels now wnl   MRI ordered, Will need LP to r/o autoimmune encephalitis although family currently not agreeable   Will attempt to obtain discharge summary and MRI report for [facility]   EEG ordered for seizure r/o   Neuro consult appreciated     Cardio: HD stable, maintain MAP > 65. CK 1922 and CKMB 13.8 this AM--> downtrended to CK 1374 and CKMB 9.4 this afternoon . troponin wnl, will trend. EKG ordered.     Pulm: Maintaining O2 sat > 92 % on room air. No other active issues.     GI: strict NPO    Renal: CKD with Scr around baseline, will trend with lytes and replete as needed. Hypernatremic to 149 almost, on D 5 80 cc/hr. Will trend BMP. I/O's. Avoid nephrotoxic agents    Heme: Hep Sub Q for DVT prophylaxis    ID: Elevated WBC to 13 with L shift, febrile to 100.7. No clear source of infection at this time, UA negative, CXR clear, Bcx NGTD. On zosyn for empiric abx coverage. Procal level wnl.     Endo; TSH wnl. No other active issues at this time.    66 y/o male PMH BPD (diagnosed 2005), CKD3, hypothyroidism Urinary retention and insomnia    Neuro: AMS/ A O x 0 since admission,   overnight code grey for severe agitation Placed on precedex gtt. Original CT head negative for acute process.  Suspect lithium toxicity vs encephalopathy vs seizures  Lithium levels now wnl   Recommend MRI and LP to r/o autoimmune encephalitis although family not agreeable   Will attempt to obtain discharge summary from wife tomorrow   EEG ordered for seizure r/o   Neuro consult appreciated     Cardio: HD stable, maintain MAP > 65. CK 1922 and CKMB 13.8 this AM--> downtrended to CK 1374 and CKMB 9.4 this afternoon . troponin wnl, will trend. EKG ordered.     Pulm: Maintaining O2 sat > 92 % on room air. No other active issues.     GI: strict NPO    Renal: CKD with Scr around baseline, will trend with lytes and replete as needed. Hypernatremic to 149 almost, on D 5 80 cc/hr. Will trend BMP. I/O's. Avoid nephrotoxic agents    Heme: Hep Sub Q for DVT prophylaxis    ID: Elevated WBC to 13 with L shift, febrile to 100.7. No clear source of infection at this time, UA negative, CXR clear, Bcx NGTD. On zosyn for empiric abx coverage. Procal level wnl.     Endo; TSH wnl. No other active issues at this time.

## 2023-04-10 NOTE — PROGRESS NOTE ADULT - PROBLEM SELECTOR PLAN 4
Chronic   - Duenas cath placed in ED  - Output 800 cc urine per RN  - Continue home tamsulosin 0.4mg BID Fever with leukocytosis  Unclear etiology  F/u culture data  Empiric Zosyn for now

## 2023-04-10 NOTE — CARE COORDINATION ASSESSMENT. - NSPASTMEDSURGHISTORY_GEN_ALL_CORE_FT
PAST MEDICAL & SURGICAL HISTORY:  Insomnia      Urinary retention      Hypothyroidism      Hard of hearing      Stage 3 chronic kidney disease      Bipolar disorder      H/O hernia repair

## 2023-04-10 NOTE — CONSULT NOTE ADULT - ASSESSMENT
Assessment/Plan  68 y/o male PMH BPD (diagnosed 2005), CKD3, hypothyroidism Urinary retention and insomnia    1. AMS  2. Sepsis  3. Hypernatremia   4. CKD  5. Aggitation.    Neuro: AMS/ A O x 0 since admission, overnight code grey for severe agitation Placed on precedex gtt titrate to RAAS 0 - -1. Original CT head negative for acute process. Repeat stat CT head ordered to r/o acute process, may need MRI . Possible metabolic encephelopathy component although no clear source at this time. Possible lithium tox component, although lithium level normalized.  Neurology consult appreciated.  Cardio: HD stable, maintain MAP > 65. CKMB elevated to 13, troponin wnl, will trend. EKG ordered.   Pulm: Maintaing O2 sat > 92 % on room air. No other active issues.   GI: NPO  Renal: CKD with Scr around baseline, will trend with lytes and replete as needed. Hypernatremic to 148 almost 11 unit increase from yesterday , started on D 5 80 cc/hr. Will trend BMP. I/O's. Avoid nephrotoxic agents  Heme: Hep Sub Q for DVT prophylaxis  ID: Elevated WBC to 13 with L shift, febrile to 100.7. No clear source of infection at this time, UA negative, CXR clear, Bcx NGTD. Will justice scan for possible infectious source with CT chest/abd/pelvis. Started on zosyn for empiric abx coverage. Procal level wnl.   Endo; TSH wnl. No other active issues at this time.   Dispo : Accepted to ICU for AMS of unknown etiology at this time and severe agitation       Case Discussed with eICU attending Dr Joseph and plan agreed to.

## 2023-04-10 NOTE — PROGRESS NOTE ADULT - PROBLEM SELECTOR PLAN 6
Chronic  - Continue home levothyroxine 25mcg daily Patient recently diagnosed w/ CKD3 at [facility]  - Cr. 1.9 on admission, baseline unknown  - Continue to monitor with daily BMP  - Avoid nephrotoxic agents.  - Renal f/u

## 2023-04-10 NOTE — CARE COORDINATION ASSESSMENT. - OTHER PERTINENT DISCHARGE PLANNING INFORMATION:
As per pt spouse, pt has been hospitalized 6x as of recent for the same medical concern of cognitive/ physical decline. Pt recently diagnosed with stage 3 kidney disease per spouse. Pt had appt scheduled with PCP but spouse will reschedule due to pt hospitalization.

## 2023-04-10 NOTE — PROGRESS NOTE ADULT - PROBLEM SELECTOR PLAN 5
Patient recently diagnosed w/ CKD3 at [facility]  - Cr. 1.9 on admission, baseline unknown  - Continue to monitor with daily BMP  - Avoid nephrotoxic agents.  - Renal f/u Chronic   - Duenas cath replaced in ED  - Continue home tamsulosin 0.4mg BID

## 2023-04-10 NOTE — CARE COORDINATION ASSESSMENT. - OTHER PERTINENT REFERRAL INFORMATION
Pt is [place] and wears hearing aides. Pt does not have HEARN with him in the hospital as family fears that they will be lost. Spouse requesting that pt be communicated with by writing information on paper as pt can become agitated when he is unable to properly understand when being spoken to.

## 2023-04-10 NOTE — CARE COORDINATION ASSESSMENT. - NSCAREPROVIDERS_GEN_ALL_CORE_FT
CARE PROVIDERS:  Accepting Physician: Cyrus Rojas  Access Services: Shane Mitchell  Administration: John Sánchez  Administration: Buck Lynn  Admitting: Melquiades Dumont  Attending: Melquiades Dumont  Consultant: Santino Barroso  Consultant: Leandro Floyd  Consultant: Sanjeev Webster  Consultant: Salas Joseph  Consultant: Alirio Gutierrez  Consultant: Sonido Quiñones  Consultant: Salas Roe  Covering Team: Breanne Guerra  Covering Team: Maggie Ferrara  Covering Team: Maryam Driscoll  Covering Team: Jez Hare  ED Attending: Alexsander Hernandez  ED Nurse: Brandin Murillo  Nurse: Shane Land  Nurse: Ruby Vasquez  Nurse: Faiza Penaloza  Nurse: Frannie Campbell  Nurse: Malia Andrade  Nurse: Isamar Cardenas  Nurse: Lillian Jensen  Nurse: Sejal Lemon  Nurse: Maia Morales  Nurse: Randi Cha  Ordered: ADM, User  Ordered: ServiceAccount, [facility] CenterMLM  Outpatient Provider: Amico, Frank  Override: Randi Cha  Override: Rosio Roberts  PCA/Nursing Assistant: Tyrone Dumont  Primary Team: Catherine De Los Santos  Primary Team: Page Puente  Primary Team: Jermaine Shaikh  Primary Team: Khadra Grubbs  Primary Team: Frannie Castellanos  Primary Team: Chris Lomeli  Primary Team: Chris Clancy  Primary Team: Misty Reyes  Primary Team: Cyrus Rojas  Primary Team: Melquiades Dumont  Primary Team: Thania Jin  Registered Dietitian: Maria Elena Page  Respiratory Therapy: Ricardo Kraus  Respiratory Therapy: Sapna Waters  : Lisa Freeman  Speech Pathology: Leena Garcia

## 2023-04-10 NOTE — PROGRESS NOTE ADULT - SUBJECTIVE AND OBJECTIVE BOX
Neurology Follow up note    MAXI [id number]    HPI:  66 y/o male PMH BPD (diagnosed 2005), CKD3, hypothyroidism Urinary retention and insomnia presents to the ED for altered mental status with increased weakness and decreased po intake. Patient unable to answer questions secondary to mental status. History obtained from patient's wife and son via telephone. Patient was recently admitted to [facility] for a manic episode ~3 weeks ago. There his dose of Bupropion was decreased 150mg TID to once daily, he also had a normal CT head and was diagnosed with CKD3.  Soon after discharge, patient began to have increased weakness, unsteady gait with multiple mechanical falls, slurred speech, and decreased po intake. These symptoms seemed to have a rapid onset ~10 days and have not improved. His son also notes, he seemed to have pain with swallowing as well.   Of note, patient's mom also had hx of Alzheimer's dementia and parkinson's disease which she was diagnosed with in her 70s and family is concerned he might be developing a neurological disorder.   At time of exam, patient unable to answer questions or follow commands.     ED Course  T: 96.6F HR 98 /83 RR 20 SpO2 98% on RA  Labs:  wbc 12.30 PT/INR 13.9/1.19 BUN/Cr 24/1.90  UA: negative  CXR: grossly clear (f/u official read)  EKG: NSR HR 80  CT Head: Grossly, no acute hemorrhage mass or mass effect seen. Better  CT is recommended when patient can tolerate it or sedation be given.    In the ED, pt given NS 1L bolus x1, Ativan 1mg IVPx1   (08 Apr 2023 13:21)      Interval History -events noted    Patient is seen, chart was reviewed and case was discussed with the treatment team.  Pt is not in any distress.   Lying on bed comfortably.       Vital Signs Last 24 Hrs  T(C): 37 (10 Apr 2023 12:04), Max: 38.3 (10 Apr 2023 02:37)  T(F): 98.6 (10 Apr 2023 12:04), Max: 100.9 (10 Apr 2023 02:37)  HR: 50 (10 Apr 2023 13:15) (50 - 105)  BP: 126/59 (10 Apr 2023 13:15) (93/50 - 172/76)  BP(mean): 85 (10 Apr 2023 13:15) (65 - 115)  RR: 34 (10 Apr 2023 13:15) (16 - 45)  SpO2: 98% (10 Apr 2023 13:15) (91% - 100%)    Parameters below as of 10 Apr 2023 00:50  Patient On (Oxygen Delivery Method): room air            REVIEW OF SYSTEMS:      limited due to poor mentation  not in any distress/pain  On Neurological Examination:    Mental Status - Pt is lethargic but easily arousible  not following simple commands    Speech -  Pt has dysarthria.    Cranial Nerves - Pupils 3 mm equal and reactive to light, extraocular eye movements intact. Pt has no visual field deficit.  Pt has no  facial asymmetry. Facial sensation is intact.Tongue - is in midline.    Muscle tone - is normal      Motor Exam - 4/5 all over, No drift. No shaking or tremors.    Sensory Exam - Pt withdraws all extremities equally on stimulation. No asymmetry seen. No complaints of tingling, numbness.    coordination:    Finger to nose: normal    Deep tendon Reflexes - 2 plus all over.          Neck Supple -  Yes.     MEDICATIONS    dexMEDEtomidine Infusion 0.1 MICROgram(s)/kG/Hr IV Continuous <Continuous>  dextrose 5% + sodium chloride 0.45%. 1000 milliLiter(s) IV Continuous <Continuous>  haloperidol    Injectable 1 milliGRAM(s) IntraMuscular every 6 hours PRN  heparin   Injectable 5000 Unit(s) SubCutaneous every 8 hours  levothyroxine Injectable 12.5 MICROGram(s) IV Push at bedtime  ondansetron Injectable 4 milliGRAM(s) IV Push every 8 hours PRN  piperacillin/tazobactam IVPB.- 3.375 Gram(s) IV Intermittent once  piperacillin/tazobactam IVPB.. 3.375 Gram(s) IV Intermittent every 8 hours      Allergies    No Known Allergies    Intolerances        LABS:  CBC Full  -  ( 10 Apr 2023 08:30 )  WBC Count : 15.99 K/uL  RBC Count : 4.21 M/uL  Hemoglobin : 12.6 g/dL  Hematocrit : 39.6 %  Platelet Count - Automated : 208 K/uL  Mean Cell Volume : 94.1 fl  Mean Cell Hemoglobin : 29.9 pg  Mean Cell Hemoglobin Concentration : 31.8 gm/dL  x      04-10    149<H>  |  121<H>  |  15  ----------------------------<  124<H>  4.0   |  22  |  1.60<H>    Ca    9.9      10 Apr 2023 08:30  Phos  2.6     04-10  Mg     2.0     04-10    TPro  6.6  /  Alb  3.7  /  TBili  0.7  /  DBili  x   /  AST  63<H>  /  ALT  40  /  AlkPhos  108  04-10    Hemoglobin A1C:     Vitamin B12 Vitamin B12, Serum: 692 pg/mL (04-10 @ 08:30)      RADIOLOGY    ASSESSMENT AND PLAN:      seen for ams  sp lithium toxicity  still encephalopathic    EEG  BRAIN MRI   NEED  FOR LP TO R/O AUTOIMMUNE ENCEPHALITIS  management dw critical care team  Pain is accessed and addressed.  Would continue to follow.

## 2023-04-10 NOTE — CHART NOTE - NSCHARTNOTEFT_GEN_A_CORE
GUILLERMO SIDDIQUI    Called by RN around 23:40 on 4/9/23 as patient severely agitated. Pt seen at bedside. Guillermo Siddiqui called overhead at 23:43 at the beginning of this encounter for patient and staff safety. Pt attempting to climb out of bed, pulling at IV lines, requiring multiple people (at least 5) to hold him to prevent him from being a danger to himself and others. Pt still remains attempting to climb out of bed, unable to be redirected upon multiple attempts by multiple staff members. Pt pulled out IV lines during encounter. Pt unable to be examined due to patient moving all extremities and fighting with provider. Of note, patient received haldol 1mg IM as ordered at 23:32 on 4/9/23 without improvement in aforementioned symptoms.    Vital Signs Last 24 Hrs  T(C): 36.8 (09 Apr 2023 21:20), Max: 36.9 (09 Apr 2023 05:20)  T(F): 98.2 (09 Apr 2023 21:20), Max: 98.4 (09 Apr 2023 05:20)  HR: 105 (09 Apr 2023 21:20) (89 - 105)  BP: 162/59 (09 Apr 2023 21:20) (139/67 - 162/59)  RR: 17 (09 Apr 2023 21:20) (17 - 17)  SpO2: 95% (09 Apr 2023 21:20) (95% - 100%)    UNABLE TO OBTAIN NEW VITAL SIGNS AND PERFORM PHYSICAL DUE TO PATIENT AGITATION- pulse ox obtained around 00:40AM on 4/10/23 SpO2 92% on room air      Assessment/Plan:  66 y/o male PMH BPD (diagnosed 2005), CKD3, hypothyroidism, urinary retention and insomnia, hard of hearing, presented to the ED for altered mental status with increased weakness and decreased po intake. Admitted for AMS secondary to lithium toxicity versus ?electrolyte derangements (hyperNa) versus acute cranial pathology. CT head on admission limited secondary to motion.    CODE GREY EVENTS:  Patient with severe agitation, s/p haldol 1mg x1 around 23:32 on 4/9/23, without improvement in agitation. Patient subsequently received Ativan 1mg IM x1 at 23:52 and Haldol 1mg IM x1 at 23:55, without improvement. Still requiring 5+ staff members including security, RN staff, at bedside to hold patient down. Pt remains a danger to himself and staff members. Additional Ativan 1mg IM x1 given , no improvement in agitation. Benadryl 25mg IV x1 given, again without improvement, still requiring 5+ staff members at bedside. Additional haldol 1mg x1 given and benadryl 25mg IV. ICU consulted. See below.    - patient received ativan, haldol, benadryl as above without improvement in severe agitation symptoms  - bilateral wrist restraints ordered, constant observation 1:1 dc-ed given hospital policy   - patient VS unable to be obtained given severe agitation, though pulse ox at end of encounter SpO2 > 90%  - ICU consulted for severe, persistent agitation - accepting patient for consideration of precedex gtt  - additional haldol 2.5mg IV x 1 ordered per ICU recommendations-   - I updated Dr SANTOS Hare (covering for Dr Dumont) of the above events  - I spoke with patient's wife Alejandra via phone at phone # in chart: wife updated on events preceding code grey and that patient is a threat to himself and others given severely agitated. Wife informed of medical necessity for above medications given patient's safety and safety of staff at risk. The patient's wife states she does not believe in medications "I don't even take a baby aspirin myself". Pt again educated on importance of patient's safety in this acute episode of agitation, refractory to non-pharmacological methods of de-escalation. Wife states she will speak to primary speak and psychiatrist in AM. GUILLERMO SIDDIQUI    Called by RN around 23:40 on 4/9/23 as patient severely agitated. Pt seen at bedside. Guillermo Siddiqui called overhead at 23:43 at the beginning of this encounter for patient and staff safety. Pt attempting to climb out of bed, pulling at IV lines, requiring multiple people (at least 5) to hold him to prevent him from being a danger to himself and others. Pt still remains attempting to climb out of bed, unable to be redirected upon multiple attempts by multiple staff members. Pt pulled out IV lines during encounter. Pt unable to be examined due to patient moving all extremities and fighting with provider. Of note, patient received haldol 1mg IM as ordered at 23:32 on 4/9/23 without improvement in aforementioned symptoms.    Vital Signs Last 24 Hrs  T(C): 36.8 (09 Apr 2023 21:20), Max: 36.9 (09 Apr 2023 05:20)  T(F): 98.2 (09 Apr 2023 21:20), Max: 98.4 (09 Apr 2023 05:20)  HR: 105 (09 Apr 2023 21:20) (89 - 105)  BP: 162/59 (09 Apr 2023 21:20) (139/67 - 162/59)  RR: 17 (09 Apr 2023 21:20) (17 - 17)  SpO2: 95% (09 Apr 2023 21:20) (95% - 100%)    UNABLE TO OBTAIN NEW VITAL SIGNS AND PERFORM PHYSICAL DUE TO PATIENT AGITATION- pulse ox obtained around 00:40AM on 4/10/23 SpO2 92% on room air      Assessment/Plan:  66 y/o male PMH BPD (diagnosed 2005), CKD3, hypothyroidism, urinary retention and insomnia, hard of hearing, presented to the ED for altered mental status with increased weakness and decreased po intake. Admitted for AMS secondary to lithium toxicity versus ?electrolyte derangements (hyperNa) versus acute cranial pathology. CT head on admission limited secondary to motion.    CODE GREY EVENTS:  Patient with severe agitation, s/p haldol 1mg x1 around 23:32 on 4/9/23, without improvement in agitation. Patient subsequently received Ativan 1mg IM x1 at 23:52 and Haldol 1mg IM x1 at 23:55, without improvement. Still requiring 5+ staff members including security, RN staff, at bedside to hold patient down. Pt remains a danger to himself and staff members. Additional Ativan 1mg IM x1 given , no improvement in agitation. Benadryl 25mg IV x1 given, again without improvement, still requiring 5+ staff members at bedside. Additional haldol 1mg x1 given and benadryl 25mg IV. ICU consulted. See below.    - patient received ativan, haldol, benadryl as above without improvement in severe agitation symptoms  - bilateral wrist restraints ordered, constant observation 1:1 dc-ed given hospital policy   - patient VS unable to be obtained given severe agitation, though pulse ox at end of encounter SpO2 > 90%  - ICU consulted for severe, persistent agitation - accepting patient for consideration of precedex gtt  - additional haldol 2.5mg IV x 1 ordered per ICU recommendations-   - I updated Dr SANTOS Hare (covering for Dr Dumont) of the above events  - I spoke with patient's wife Alejandra via phone at phone # in chart: wife updated on events preceding code grey and that patient is a threat to himself and others given severely agitated. Wife informed of medical necessity for above medications given patient's safety and safety of staff at risk. The patient's wife states she does not believe in medications "I don't even take a baby aspirin myself". Pt again educated on importance of patient's safety in this acute episode of agitation, refractory to non-pharmacological methods of de-escalation. Wife states she will speak to primary speak and psychiatrist in AM.  - consider repeat CT head when patient able to tolerate to rule out acute intracranial pathology given initial CT head on admission limited secondary to motion artifact GUILLERMO SIDDIQUI    Called by RN around 23:40 on 4/9/23 as patient severely agitated. Pt seen at bedside. Guillermo Siddiqui called overhead at 23:43 at the beginning of this encounter for patient and staff safety. Pt attempting to climb out of bed, pulling at IV lines, requiring multiple people (at least 5) to hold him to prevent him from being a danger to himself and others. Pt still remains attempting to climb out of bed, unable to be redirected upon multiple attempts by multiple staff members. Pt pulled out IV lines during encounter. Pt unable to be examined due to patient moving all extremities and fighting with provider. Of note, patient received haldol 1mg IM as ordered at 23:32 on 4/9/23 without improvement in aforementioned symptoms.    Vital Signs Last 24 Hrs  T(C): 36.8 (09 Apr 2023 21:20), Max: 36.9 (09 Apr 2023 05:20)  T(F): 98.2 (09 Apr 2023 21:20), Max: 98.4 (09 Apr 2023 05:20)  HR: 105 (09 Apr 2023 21:20) (89 - 105)  BP: 162/59 (09 Apr 2023 21:20) (139/67 - 162/59)  RR: 17 (09 Apr 2023 21:20) (17 - 17)  SpO2: 95% (09 Apr 2023 21:20) (95% - 100%)    UNABLE TO OBTAIN NEW VITAL SIGNS AND PERFORM PHYSICAL DUE TO PATIENT AGITATION- pulse ox obtained around 00:40AM on 4/10/23 SpO2 92% on room air      Assessment/Plan:  68 y/o male PMH BPD (diagnosed 2005), CKD3, hypothyroidism, urinary retention and insomnia, hard of hearing, presented to the ED for altered mental status with increased weakness and decreased po intake. Admitted for AMS secondary to lithium toxicity versus ?electrolyte derangements (hyperNa) versus acute cranial pathology. CT head on admission limited secondary to motion.    CODE GREY EVENTS:  Patient with severe agitation, s/p haldol 1mg x1 around 23:32 on 4/9/23, without improvement in agitation. Patient subsequently received Ativan 1mg IM x1 at 23:52 and Haldol 1mg IM x1 at 23:55, without improvement. Still requiring 5+ staff members including security, RN staff, at bedside to hold patient down. Pt remains a danger to himself and staff members. Additional Ativan 1mg IM x1 given , no improvement in agitation. Benadryl 25mg IV x1 given, again without improvement, still requiring 5+ staff members at bedside. Additional haldol 1mg x1 given and benadryl 25mg IV. ICU consulted. See below.    - patient received ativan, haldol, benadryl as above without improvement in severe agitation symptoms  - bilateral wrist restraints ordered, constant observation 1:1 dc-ed given hospital policy   - patient VS unable to be obtained given severe agitation, though pulse ox at end of encounter SpO2 > 90%  - ICU consulted for severe, persistent agitation - accepting patient for consideration of precedex gtt  - additional haldol 2.5mg IV x 1 ordered per ICU recommendations-   - I updated Dr SANTOS Hare (covering for Dr Dumont) of the above events  - I spoke with patient's wife Alejandra via phone at phone # in chart: wife updated on events preceding code grey and that patient is a threat to himself and others given severely agitated. Wife informed of medical necessity for above medications given patient's safety and safety of staff at risk. The patient's wife states she does not believe in medications; however, educated on importance of patient's safety in this acute episode of agitation, refractory to non-pharmacological methods of de-escalation. Wife states she will speak to psychiatrist in AM. Pt's wife informed of ICU consult and transfer plan for tonight. Wife will follow up with primary team in AM.  - consider repeat CT head when patient able to tolerate to rule out acute intracranial pathology given initial CT head on admission limited secondary to motion artifact GUILLERMO SIDDIQUI    Called by RN around 23:40 on 4/9/23 as patient severely agitated. Pt seen at bedside. Guillermo Siddiqui called overhead at 23:43 at the beginning of this encounter for patient and staff safety. Pt attempting to climb out of bed, pulling at IV lines, requiring multiple people (at least 5) to hold him to prevent him from being a danger to himself and others. Pt still remains attempting to climb out of bed, unable to be redirected upon multiple attempts by multiple staff members. Pt pulled out IV lines during encounter. Pt unable to be examined due to patient moving all extremities and fighting with provider. Of note, patient received haldol 1mg IM as ordered at 23:32 on 4/9/23 without improvement in aforementioned symptoms.    Vital Signs Last 24 Hrs  T(C): 36.8 (09 Apr 2023 21:20), Max: 36.9 (09 Apr 2023 05:20)  T(F): 98.2 (09 Apr 2023 21:20), Max: 98.4 (09 Apr 2023 05:20)  HR: 105 (09 Apr 2023 21:20) (89 - 105)  BP: 162/59 (09 Apr 2023 21:20) (139/67 - 162/59)  RR: 17 (09 Apr 2023 21:20) (17 - 17)  SpO2: 95% (09 Apr 2023 21:20) (95% - 100%)    UNABLE TO OBTAIN NEW VITAL SIGNS AND PERFORM PHYSICAL DUE TO PATIENT AGITATION- pulse ox obtained around 00:40AM on 4/10/23 SpO2 92% on room air      Assessment/Plan:  66 y/o male PMH BPD (diagnosed 2005), CKD3, hypothyroidism, urinary retention and insomnia, hard of hearing, presented to the ED for altered mental status with increased weakness and decreased po intake. Admitted for AMS secondary to lithium toxicity versus ?electrolyte derangements (hyperNa) versus acute cranial pathology. CT head on admission limited secondary to motion.    CODE GREY EVENTS:  Patient with severe agitation, s/p haldol 1mg x1 around 23:32 on 4/9/23, without improvement in agitation. Patient subsequently received Ativan 1mg IM x1 at 23:52 and Haldol 1mg IM x1 at 23:55, without improvement. Still requiring 5+ staff members including security, RN staff, at bedside to hold patient down. Pt remains a danger to himself and staff members. Additional Ativan 1mg IM x1 given , no improvement in agitation. Benadryl 25mg IV x1 given, again without improvement, still requiring 5+ staff members at bedside. Additional haldol 1mg x1 given and benadryl 25mg IV. ICU consulted. See below.    - patient received ativan, haldol, benadryl as above without improvement in severe agitation symptoms  - bilateral wrist restraints ordered, constant observation 1:1 dc-ed given hospital policy   - patient VS unable to be obtained given severe agitation, though pulse ox at end of encounter SpO2 > 90%  - ICU consulted for severe, persistent agitation - accepting patient for consideration of precedex gtt  - additional haldol 2.5mg IV x 1 ordered per ICU recommendations-   - I updated Dr SANTOS Hare (covering for Dr Dumont) of the above events  - I spoke with patient's wife Alejandra via phone at phone # in chart: wife updated on events preceding code grey and that patient is a threat to himself and others given severely agitated. Wife informed of medical necessity for above medications given patient's safety and safety of staff at risk. The patient's wife states she does not believe in medications; however, educated on importance of patient's safety in this acute episode of agitation, refractory to non-pharmacological methods of de-escalation. Wife states she will speak to psychiatrist in AM. Pt's wife informed of ICU consult and transfer plan for tonight. Wife will follow up with primary team in AM.  - consider repeat CT head when patient able to tolerate to rule out acute intracranial pathology given initial CT head on admission limited secondary to motion artifact                      Attending note: Upon my arrival to [facility] patient found to be severely agitated. Had received his ordered 1mg haldol prior to events. Patient requiring multiple staff members and security guards for restraint. Chart reviewed, patient admitted for ams and has been aaox0 since admission. Neurology notes reviewed. Patient awaiting psych eval for possible psych component. Lithium levels initially elevated but have since decreased since admission. Patient with no iv access. Given ativan 1mg x2 IM, additional 1mg haldol IM with no improvement in symptoms. Benadryl 25mg x2 given as well as additional 1mg haldol without improvement. ICU consulted as patient had received 3mg haldol, 2mg ativan and 50mg iv Benadryl with no improvement in symptoms. ICU PA evaluated patient and accepted to ICU, ICU PA requesting additional 2.5mg haldol be given prior to transport to ICU. Patient will likely require repeat imaging and blood work when able to be safely completed to further evaluate cause of his AMS and severe agitation. ?neurologic infectious process, patient with low grade temp during encounter and noted to have elevated wbc on admission. Full septic work up underway. Recommend ABG, ammonia level, repeat CT head, CBC, CMP, lactate, procal, repeat blood cultures. Further workup/treatment per ICU.  Patient wife updated by Dr. Grubbs.   - Misty Reyes DO
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
## 2023-04-10 NOTE — CARE COORDINATION ASSESSMENT. - QUALITY OF FAMILY RELATIONSHIPS
spouse involved with care. Pt has 2 adult children that live [place] and are able to assist as needed./supportive/involved/helpful

## 2023-04-10 NOTE — CONSULT NOTE ADULT - SUBJECTIVE AND OBJECTIVE BOX
REASON FOR CONSULT: AMS    CONSULT REQUESTED BY: Hospitalist     Patient is a 67y old  Male who presents with a chief complaint of AMS (2023 13:07)      BRIEF HOSPITAL COURSE:   66 y/o male PMH BPD (diagnosed ), CKD3, hypothyroidism Urinary retention and insomnia presents to the ED for altered mental status with increased weakness and decreased po intake. Patient unable to answer questions secondary to mental status. History obtained from patient's wife and son via telephone. Patient was recently admitted to [facility] for a manic episode ~3 weeks ago. There his dose of Bupropion was decreased 150mg TID to once daily, he also had a normal CT head and was diagnosed with CKD3.  Soon after discharge, patient began to have increased weakness, unsteady gait with multiple mechanical falls, slurred speech, and decreased po intake. These symptoms seemed to have a rapid onset ~10 days and have not improved. His son also notes, he seemed to have pain with swallowing as well.   Of note, patient's mom also had hx of Alzheimer's dementia and parkinson's disease which she was diagnosed with in her 70s and family is concerned he might be developing a neurological disorder.       Events last 24 hours:   Code grey during night for extreme aggitaion and AMS. ICU consulted after several hours of pushes of haladol/ ativan/ 4 point restraints, and multiple employees holding down patient with continued aggitaion. Upgrade to ICU for active management of AMS.     PAST MEDICAL & SURGICAL HISTORY:  Bipolar disorder      Stage 3 chronic kidney disease      Hard of hearing      Hypothyroidism      Urinary retention      Insomnia      H/O hernia repair        Allergies    No Known Allergies    Intolerances      FAMILY HISTORY:  FH: Parkinson's disease (Mother)    FH: Alzheimers disease (Mother)        Review of Systems:  Negqative 2/2 mental status.     Medications:        dexMEDEtomidine Infusion 0.1 MICROgram(s)/kG/Hr IV Continuous <Continuous>  haloperidol    Injectable 1 milliGRAM(s) IntraMuscular every 6 hours PRN  ondansetron Injectable 4 milliGRAM(s) IV Push every 8 hours PRN      heparin   Injectable 5000 Unit(s) SubCutaneous every 8 hours      tamsulosin 0.4 milliGRAM(s) Oral at bedtime    levothyroxine Injectable 12.5 MICROGram(s) IV Push at bedtime    dextrose 5% + sodium chloride 0.45%. 1000 milliLiter(s) IV Continuous <Continuous>                ICU Vital Signs Last 24 Hrs  T(C): 38 (10 Apr 2023 04:30), Max: 38.3 (10 Apr 2023 02:37)  T(F): 100.4 (10 Apr 2023 04:30), Max: 100.9 (10 Apr 2023 02:37)  HR: 58 (10 Apr 2023 04:02) (58 - 105)  BP: 97/56 (10 Apr 2023 04:02) (97/56 - 172/76)  BP(mean): 73 (10 Apr 2023 04:02) (72 - 93)  ABP: --  ABP(mean): --  RR: 22 (10 Apr 2023 04:02) (17 - 39)  SpO2: 97% (10 Apr 2023 04:02) (91% - 100%)    O2 Parameters below as of 10 Apr 2023 00:50  Patient On (Oxygen Delivery Method): room air          Vital Signs Last 24 Hrs  T(C): 38 (10 Apr 2023 04:30), Max: 38.3 (10 Apr 2023 02:37)  T(F): 100.4 (10 Apr 2023 04:30), Max: 100.9 (10 Apr 2023 02:37)  HR: 58 (10 Apr 2023 04:02) (58 - 105)  BP: 97/56 (10 Apr 2023 04:02) (97/56 - 172/76)  BP(mean): 73 (10 Apr 2023 04:02) (72 - 93)  RR: 22 (10 Apr 2023 04:02) (17 - 39)  SpO2: 97% (10 Apr 2023 04:02) (91% - 100%)    Parameters below as of 10 Apr 2023 00:50  Patient On (Oxygen Delivery Method): room air        ABG - ( 10 Apr 2023 02:07 )  pH, Arterial: 7.37  pH, Blood: x     /  pCO2: 37    /  pO2: 90    / HCO3: 21    / Base Excess: -3.9  /  SaO2: 99.5                I&O's Detail    2023 07:01  -  2023 07:00  --------------------------------------------------------  IN:  Total IN: 0 mL    OUT:    Indwelling Catheter - Urethral (mL): 2800 mL  Total OUT: 2800 mL    Total NET: -2800 mL      2023 07:01  -  10 Apr 2023 05:04  --------------------------------------------------------  IN:    Dexmedetomidine: 2 mL    dextrose 5% + sodium chloride 0.45%: 240 mL  Total IN: 242 mL    OUT:    Indwelling Catheter - Urethral (mL): 2000 mL  Total OUT: 2000 mL    Total NET: -1758 mL            LABS:                        11.7   13.16 )-----------( 175      ( 10 Apr 2023 02:15 )             36.2     04-10    148<H>  |  122<H>  |  14  ----------------------------<  121<H>  3.6   |  23  |  1.50<H>    Ca    9.2      10 Apr 2023 02:15  Phos  2.6     04-10  Mg     2.0     04-10    TPro  6.6  /  Alb  3.7  /  TBili  0.7  /  DBili  x   /  AST  63<H>  /  ALT  40  /  AlkPhos  108  04-10      CARDIAC MARKERS ( 10 Apr 2023 02:15 )  x     / x     / 1922 U/L / x     / 13.8 ng/mL      CAPILLARY BLOOD GLUCOSE        PT/INR - ( 2023 07:00 )   PT: 13.9 sec;   INR: 1.19 ratio         PTT - ( 2023 07:00 )  PTT:28.9 sec  Urinalysis Basic - ( 2023 10:40 )    Color: Yellow / Appearance: Clear / S.010 / pH: x  Gluc: x / Ketone: Trace  / Bili: Negative / Urobili: Negative   Blood: x / Protein: 15 / Nitrite: Negative   Leuk Esterase: Negative / RBC: 0-2 /HPF / WBC 0-2   Sq Epi: x / Non Sq Epi: x / Bacteria: x      CULTURES:  Culture Results:   No growth ( @ 10:40)  Culture Results:   No growth to date. ( @ 07:00)  Culture Results:   No growth to date. ( @ 07:00)      Physical Examination:    General: Agitated    Neuro: Aggitated, A O x 0.     HEENT: Pupils equal, reactive to light.  Symmetric.    PULM: Clear to auscultation bilaterally, no significant sputum production    CVS: Regular rate and rhythm, no murmurs, rubs, or gallops    ABD: Soft, nondistended, nontender, normoactive bowel sounds, no masses    EXT: No edema, nontender    SKIN: Warm and well perfused, no rashes noted.

## 2023-04-10 NOTE — PROGRESS NOTE ADULT - PROBLEM SELECTOR PLAN 1
AMS in the setting of progressive dementia vs. medications vs. electrolyte imbalance  - Patient w/ unsteady gait, multiple mechanical falls, decreased po intake, dysphagia  - Leukocytosis (12.30) of unclear etiology on admission  - Transferred to ICU to be started on Precedex drip  - CT head: Grossly, no acute hemorrhage mass or mass effect seen. Better  CT is recommended when   patient can tolerate it or sedation be given.  - UA: negative   - AAOx0 (person) on admission  - Fall, aspiration precautions  - Speech and Swallow consulted, appreciate recs. NPO except meds for now  - F/u UCx, BCx x2 -- NTD  - Neuro consult  - Psych (Dr. Floyd) consulted, f/u recs  - PT/OT, SW, nutrition consulted , f/u recs AMS in the setting of progressive dementia vs. medications vs. electrolyte imbalance  - Transferred to ICU to be started on Precedex drip b/c patient became acutely delirious   - Patient w/ unsteady gait, multiple mechanical falls, decreased po intake, dysphagia  - Leukocytosis (12.30) of unclear etiology on admission  - CT head: Grossly, no acute hemorrhage mass or mass effect seen.   - UA: negative   - AAOx0 (person) on admission  - Fall, aspiration precautions  - F/u UCx, BCx x2 -- NTD  - Neuro f/u  - Psych (Dr. Floyd) consulted, f/u recs  - PT/OT, SW, nutrition consulted , f/u recs

## 2023-04-10 NOTE — PROGRESS NOTE ADULT - SUBJECTIVE AND OBJECTIVE BOX
Interval Events:   Patient seen and examined at bedside. Remains agitated continues have jerking motions. On Precedex drip. Given 1 mg Haldol at 1 pm today.     Review of Systems:  unable to obtain     ICU Vital Signs Last 24 Hrs  T(C): 37 (10 Apr 2023 12:04), Max: 38.3 (10 Apr 2023 02:37)  T(F): 98.6 (10 Apr 2023 12:04), Max: 100.9 (10 Apr 2023 02:37)  HR: 50 (10 Apr 2023 14:00) (48 - 105)  BP: 102/58 (10 Apr 2023 14:00) (93/50 - 172/76)  BP(mean): 77 (10 Apr 2023 14:00) (65 - 115)  ABP: --  ABP(mean): --  RR: 34 (10 Apr 2023 14:00) (16 - 45)  SpO2: 97% (10 Apr 2023 14:00) (91% - 100%)    O2 Parameters below as of 10 Apr 2023 00:50  Patient On (Oxygen Delivery Method): room air              04-09-23 @ 07:01  -  04-10-23 @ 07:00  --------------------------------------------------------  IN: 402 mL / OUT: 2320 mL / NET: -1918 mL    04-10-23 @ 07:01  -  04-10-23 @ 14:08  --------------------------------------------------------  IN: 510.2 mL / OUT: 170 mL / NET: 340.2 mL        CAPILLARY BLOOD GLUCOSE          I&O's Summary    09 Apr 2023 07:01  -  10 Apr 2023 07:00  --------------------------------------------------------  IN: 402 mL / OUT: 2320 mL / NET: -1918 mL    10 Apr 2023 07:01  -  10 Apr 2023 14:08  --------------------------------------------------------  IN: 510.2 mL / OUT: 170 mL / NET: 340.2 mL        PHYSICAL EXAM:  GENERAL: NAD  HEAD:  Atraumatic, Normocephalic  EYES: EOMI, PERRLA, conjunctiva and sclera clear  ENMT: No tonsillar erythema, exudates, or enlargement; Moist mucous membranes  NECK: Supple, No JVD  NERVOUS SYSTEM:  agitated  CHEST/LUNG: Clear to auscultation bilaterally; No rales, rhonchi, wheezing,  HEART: Regular rate and rhythm  ABDOMEN: Soft, Nontender, Nondistended; Bowel sounds present  EXTREMITIES:  No clubbing, cyanosis, or edema  LYMPH: No lymphadenopathy noted  SKIN: No rashes    Meds:  piperacillin/tazobactam IVPB.- IV Intermittent  piperacillin/tazobactam IVPB.. IV Intermittent      levothyroxine Injectable IV Push      dexMEDEtomidine Infusion IV Continuous  haloperidol    Injectable IntraMuscular PRN  ondansetron Injectable IV Push PRN      heparin   Injectable SubCutaneous        dextrose 5% + sodium chloride 0.45%. IV Continuous                                  12.6   15.99 )-----------( 208      ( 10 Apr 2023 08:30 )             39.6       04-10    149<H>  |  121<H>  |  15  ----------------------------<  124<H>  4.0   |  22  |  1.60<H>    Ca    9.9      10 Apr 2023 08:30  Phos  2.6     04-10  Mg     2.0     04-10    TPro  6.6  /  Alb  3.7  /  TBili  0.7  /  DBili  x   /  AST  63<H>  /  ALT  40  /  AlkPhos  108  04-10    Lactate 1.2           04-10 @ 02:15      CARDIAC MARKERS ( 10 Apr 2023 12:40 )  x     / x     / 1374 U/L / x     / 9.4 ng/mL  CARDIAC MARKERS ( 10 Apr 2023 02:15 )  x     / x     / 1922 U/L / x     / 13.8 ng/mL          Clean Catch Clean Catch (Midstream)   No growth -- 04-08 @ 10:40  .Blood Blood-Peripheral   No growth to date. -- 04-08 @ 07:00              Radiology:    Bedside Ultrasound:    Tubes/Lines:      GLOBAL ISSUE/BEST PRACTICE:  Analgesia:Y  Sedation:Y  HOB elevation: Y  Stress ulcer prophylaxis:  VTE prophylaxis:Y  Glycemic control:N  Nutrition:NPO     CODE STATUS: full code        Interval Events:   transferred to ICU after escalating agitation overnight  received total of 2mg ativan and 5.5mg haldol and 50IV benadryl in divided doses  remained agitated and required precedex for control of behavior  Tm 100.9 at 2am  Patient seen and examined at bedside. Remains agitated occasional jerking motions of arms. On Precedex drip      Review of Systems:  unable to obtain     ICU Vital Signs Last 24 Hrs  T(C): 37 (10 Apr 2023 12:04), Max: 38.3 (10 Apr 2023 02:37)  T(F): 98.6 (10 Apr 2023 12:04), Max: 100.9 (10 Apr 2023 02:37)  HR: 50 (10 Apr 2023 14:00) (48 - 105)  BP: 102/58 (10 Apr 2023 14:00) (93/50 - 172/76)  BP(mean): 77 (10 Apr 2023 14:00) (65 - 115)  ABP: --  ABP(mean): --  RR: 34 (10 Apr 2023 14:00) (16 - 45)  SpO2: 97% (10 Apr 2023 14:00) (91% - 100%)    O2 Parameters below as of 10 Apr 2023 00:50  Patient On (Oxygen Delivery Method): room air              04-09-23 @ 07:01  -  04-10-23 @ 07:00  --------------------------------------------------------  IN: 402 mL / OUT: 2320 mL / NET: -1918 mL    04-10-23 @ 07:01  -  04-10-23 @ 14:08  --------------------------------------------------------  IN: 510.2 mL / OUT: 170 mL / NET: 340.2 mL        CAPILLARY BLOOD GLUCOSE          I&O's Summary    09 Apr 2023 07:01  -  10 Apr 2023 07:00  --------------------------------------------------------  IN: 402 mL / OUT: 2320 mL / NET: -1918 mL    10 Apr 2023 07:01  -  10 Apr 2023 14:08  --------------------------------------------------------  IN: 510.2 mL / OUT: 170 mL / NET: 340.2 mL        PHYSICAL EXAM:  GENERAL: elderly M, ill appearing  EYES: PERRLA, conjunctiva and sclera clear  NECK: Supple, No JVD  NERVOUS SYSTEM:  sleeping or intermittently agitated, localizes and withdraws to pain in all extremities, when awaken can not follow commands or verbalize  CHEST/LUNG: Clear to auscultation bilaterally; No rales, rhonchi, wheezing,  HEART: Regular rate and rhythm  ABDOMEN: Soft, Nontender, Nondistended; Bowel sounds present  EXTREMITIES:  No clubbing, cyanosis, or edema    Meds:  piperacillin/tazobactam IVPB.- IV Intermittent  piperacillin/tazobactam IVPB.. IV Intermittent      levothyroxine Injectable IV Push      dexMEDEtomidine Infusion IV Continuous  haloperidol    Injectable IntraMuscular PRN  ondansetron Injectable IV Push PRN      heparin   Injectable SubCutaneous        dextrose 5% + sodium chloride 0.45%. IV Continuous                                  12.6   15.99 )-----------( 208      ( 10 Apr 2023 08:30 )             39.6       04-10    149<H>  |  121<H>  |  15  ----------------------------<  124<H>  4.0   |  22  |  1.60<H>    Ca    9.9      10 Apr 2023 08:30  Phos  2.6     04-10  Mg     2.0     04-10    TPro  6.6  /  Alb  3.7  /  TBili  0.7  /  DBili  x   /  AST  63<H>  /  ALT  40  /  AlkPhos  108  04-10    Lactate 1.2           04-10 @ 02:15      CARDIAC MARKERS ( 10 Apr 2023 12:40 )  x     / x     / 1374 U/L / x     / 9.4 ng/mL  CARDIAC MARKERS ( 10 Apr 2023 02:15 )  x     / x     / 1922 U/L / x     / 13.8 ng/mL          Clean Catch Clean Catch (Midstream)   No growth -- 04-08 @ 10:40  .Blood Blood-Peripheral   No growth to date. -- 04-08 @ 07:00              Radiology:  < from: CT Head No Cont (04.08.23 @ 10:29) >  INTERPRETATION:  Clinical indication: Altered mental status.    Multiple axial sections were performed from base of vertex without   contrast enhancement.    This exam is limited by motion    Parenchymal volume loss is identified    There is no acute hemorrhage mass or mass effect seen.    Evaluation of the osseous structures with the appropriate window appears   unremarkable    Minimal mucosal thickening seen involving both ethmoid sinus    Sclerotic changes seen involving both mastoid regions likely compatible   chronic plantar change.    IMPRESSION: This exam is limited by motion. Grossly, no acute hemorrhage   mass or mass effect seen. Better  CT is recommended when   patient can tolerate it or sedation be given.    --- End of Report ---    < end of copied text >  < from: Xray Chest 1 View-PORTABLE IMMEDIATE (04.08.23 @ 07:59) >  INTERPRETATION:  TECHNIQUE: A single AP view of the chest was obtained.   Ordered time:   4/8/2023 7:59 AM    COMPARISON: None    CLINICAL INFORMATION: Sepsis    FINDINGS:    The heart is not enlarged.  The lungs are clear.  There are no pleural effusions.  There is no pneumothorax.    IMPRESSION:    No acute pulmonary disease    --- End of Report ---    < end of copied text >      Tubes/Lines: carey      GLOBAL ISSUE/BEST PRACTICE:  Analgesia:Y  Sedation:Y  HOB elevation: Y  Stress ulcer prophylaxis:  VTE prophylaxis:Y  Glycemic control:N  Nutrition:NPO     CODE STATUS: full code

## 2023-04-10 NOTE — PROVIDER CONTACT NOTE (EICU) - SITUATION
Severe agitation necessitating transfer to ICU
68 y/o male PMH BPD (diagnosed 2005), CKD3, hypothyroidism Urinary retention and insomnia presents to the ED for altered mental status with increased weakness and decreased po intake. Patient unable to answer questions secondary to mental status. History obtained from patient's wife and son via telephone. Patient was recently admitted to [facility] for a manic episode ~3 weeks ago. There his dose of Bupropion was decreased 150mg TID to once daily, he also had a normal CT head and was diagnosed with CKD3.  Soon after discharge, patient began to have increased weakness, unsteady gait with multiple mechanical falls, slurred speech, and decreased po intake. These symptoms seemed to have a rapid onset ~10 days and have not improved.  Case discussed with the ICU PA

## 2023-04-10 NOTE — PROGRESS NOTE ADULT - ASSESSMENT
MICHELL: Prerenal azotemia  Elevated lithium level  Hypokalemia  Bipolar disorder, Agitation    04/09/23; Repeat labs. Monitor creatinine trend. IV hydration. Potassium supplementation. Lithium levels improving.   Will need to attempt to stop lithium and use alternative meds because of side effects of long term use of lithium including but not limited to kidney disease and worsening renal function. upon discretion of out pt psych. Avoid nephrotoxic meds as possible. Will follow electrolytes and renal function trend. Psych follow up.   04/10/23: Events noted. Pt seen in ICU. Improving creatinine trend. Elevated sodium levels. Change IVF to D5W. Monitor sodium trend and UO.  MICHELL: Prerenal azotemia  Elevated lithium level  Hypokalemia  Bipolar disorder, Agitation  Rhabdomyolysis    04/09/23; Repeat labs. Monitor creatinine trend. IV hydration. Potassium supplementation. Lithium levels improving.   Will need to attempt to stop lithium and use alternative meds because of side effects of long term use of lithium including but not limited to kidney disease and worsening renal function. upon discretion of out pt psych. Avoid nephrotoxic meds as possible. Will follow electrolytes and renal function trend. Psych follow up.   04/10/23: Events noted. Pt seen in ICU. Improving creatinine trend. Elevated sodium levels. Change IVF to D5W. Monitor sodium trend and UO.   CPK improving.

## 2023-04-10 NOTE — PROGRESS NOTE ADULT - PROBLEM SELECTOR PLAN 2
Chronic, diagnosed 2005.  - Recent admission to [facility] for manic episode, Bupropion decreased from 150mg TID to once a day  - Continue bupropion XL 150mg daily  - Hold lithium 2/2 litihium toxicity  - Hold lithium 300mg AM daily and 600mg at bedtime daily  - Previously following with Psych NP: Concha Amaro, [place], now has appointment with [facility]  - Psych (Dr. Floyd) consulted, appreciate recs Chronic, diagnosed 2005.  - Recent admission to [facility] for manic episode  - Hold lithium 2/2 litihium toxicity  - Psych (Dr. Floyd) consulted, appreciate recs

## 2023-04-11 LAB
ALBUMIN SERPL ELPH-MCNC: 3.1 G/DL — LOW (ref 3.3–5)
ALP SERPL-CCNC: 105 U/L — SIGNIFICANT CHANGE UP (ref 40–120)
ALT FLD-CCNC: 47 U/L — SIGNIFICANT CHANGE UP (ref 12–78)
ANION GAP SERPL CALC-SCNC: 2 MMOL/L — LOW (ref 5–17)
AST SERPL-CCNC: 59 U/L — HIGH (ref 15–37)
BILIRUB SERPL-MCNC: 0.6 MG/DL — SIGNIFICANT CHANGE UP (ref 0.2–1.2)
BUN SERPL-MCNC: 14 MG/DL — SIGNIFICANT CHANGE UP (ref 7–23)
CALCIUM SERPL-MCNC: 9.1 MG/DL — SIGNIFICANT CHANGE UP (ref 8.5–10.1)
CHLORIDE SERPL-SCNC: 119 MMOL/L — HIGH (ref 96–108)
CO2 SERPL-SCNC: 25 MMOL/L — SIGNIFICANT CHANGE UP (ref 22–31)
CREAT SERPL-MCNC: 1.3 MG/DL — SIGNIFICANT CHANGE UP (ref 0.5–1.3)
EGFR: 59 ML/MIN/1.73M2 — LOW
GLUCOSE SERPL-MCNC: 143 MG/DL — HIGH (ref 70–99)
HCT VFR BLD CALC: 36 % — LOW (ref 39–50)
HGB BLD-MCNC: 11.9 G/DL — LOW (ref 13–17)
MCHC RBC-ENTMCNC: 30.6 PG — SIGNIFICANT CHANGE UP (ref 27–34)
MCHC RBC-ENTMCNC: 33.1 GM/DL — SIGNIFICANT CHANGE UP (ref 32–36)
MCV RBC AUTO: 92.5 FL — SIGNIFICANT CHANGE UP (ref 80–100)
NRBC # BLD: 0 /100 WBCS — SIGNIFICANT CHANGE UP (ref 0–0)
PLATELET # BLD AUTO: 168 K/UL — SIGNIFICANT CHANGE UP (ref 150–400)
POTASSIUM SERPL-MCNC: 3.4 MMOL/L — LOW (ref 3.5–5.3)
POTASSIUM SERPL-SCNC: 3.4 MMOL/L — LOW (ref 3.5–5.3)
PROT SERPL-MCNC: 5.9 G/DL — LOW (ref 6–8.3)
RBC # BLD: 3.89 M/UL — LOW (ref 4.2–5.8)
RBC # FLD: 12.7 % — SIGNIFICANT CHANGE UP (ref 10.3–14.5)
SODIUM SERPL-SCNC: 146 MMOL/L — HIGH (ref 135–145)
WBC # BLD: 13.39 K/UL — HIGH (ref 3.8–10.5)
WBC # FLD AUTO: 13.39 K/UL — HIGH (ref 3.8–10.5)

## 2023-04-11 PROCEDURE — 99221 1ST HOSP IP/OBS SF/LOW 40: CPT

## 2023-04-11 PROCEDURE — 99291 CRITICAL CARE FIRST HOUR: CPT | Mod: GC

## 2023-04-11 RX ORDER — LEVETIRACETAM 250 MG/1
1000 TABLET, FILM COATED ORAL ONCE
Refills: 0 | Status: COMPLETED | OUTPATIENT
Start: 2023-04-11 | End: 2023-04-11

## 2023-04-11 RX ORDER — LEVOTHYROXINE SODIUM 125 MCG
18.75 TABLET ORAL AT BEDTIME
Refills: 0 | Status: DISCONTINUED | OUTPATIENT
Start: 2023-04-11 | End: 2023-04-15

## 2023-04-11 RX ORDER — LEVETIRACETAM 250 MG/1
1000 TABLET, FILM COATED ORAL ONCE
Refills: 0 | Status: DISCONTINUED | OUTPATIENT
Start: 2023-04-11 | End: 2023-04-11

## 2023-04-11 RX ORDER — POTASSIUM CHLORIDE 20 MEQ
10 PACKET (EA) ORAL ONCE
Refills: 0 | Status: COMPLETED | OUTPATIENT
Start: 2023-04-11 | End: 2023-04-11

## 2023-04-11 RX ORDER — ACYCLOVIR SODIUM 500 MG
800 VIAL (EA) INTRAVENOUS EVERY 8 HOURS
Refills: 0 | Status: DISCONTINUED | OUTPATIENT
Start: 2023-04-11 | End: 2023-04-13

## 2023-04-11 RX ORDER — LEVETIRACETAM 250 MG/1
500 TABLET, FILM COATED ORAL EVERY 12 HOURS
Refills: 0 | Status: ACTIVE | OUTPATIENT
Start: 2023-04-11 | End: 2024-03-09

## 2023-04-11 RX ORDER — ACYCLOVIR SODIUM 500 MG
800 VIAL (EA) INTRAVENOUS ONCE
Refills: 0 | Status: COMPLETED | OUTPATIENT
Start: 2023-04-11 | End: 2023-04-11

## 2023-04-11 RX ORDER — ACYCLOVIR SODIUM 500 MG
VIAL (EA) INTRAVENOUS
Refills: 0 | Status: DISCONTINUED | OUTPATIENT
Start: 2023-04-11 | End: 2023-04-13

## 2023-04-11 RX ORDER — LEVETIRACETAM 250 MG/1
500 TABLET, FILM COATED ORAL
Refills: 0 | Status: DISCONTINUED | OUTPATIENT
Start: 2023-04-11 | End: 2023-04-11

## 2023-04-11 RX ORDER — ENOXAPARIN SODIUM 100 MG/ML
40 INJECTION SUBCUTANEOUS EVERY 24 HOURS
Refills: 0 | Status: DISCONTINUED | OUTPATIENT
Start: 2023-04-11 | End: 2023-04-12

## 2023-04-11 RX ADMIN — PIPERACILLIN AND TAZOBACTAM 25 GRAM(S): 4; .5 INJECTION, POWDER, LYOPHILIZED, FOR SOLUTION INTRAVENOUS at 05:02

## 2023-04-11 RX ADMIN — DEXMEDETOMIDINE HYDROCHLORIDE IN 0.9% SODIUM CHLORIDE 2.04 MICROGRAM(S)/KG/HR: 4 INJECTION INTRAVENOUS at 18:11

## 2023-04-11 RX ADMIN — HALOPERIDOL DECANOATE 1 MILLIGRAM(S): 100 INJECTION INTRAMUSCULAR at 01:41

## 2023-04-11 RX ADMIN — HALOPERIDOL DECANOATE 1 MILLIGRAM(S): 100 INJECTION INTRAMUSCULAR at 23:18

## 2023-04-11 RX ADMIN — HALOPERIDOL DECANOATE 1 MILLIGRAM(S): 100 INJECTION INTRAMUSCULAR at 13:39

## 2023-04-11 RX ADMIN — Medication 100 MILLIEQUIVALENT(S): at 09:13

## 2023-04-11 RX ADMIN — ENOXAPARIN SODIUM 40 MILLIGRAM(S): 100 INJECTION SUBCUTANEOUS at 13:43

## 2023-04-11 RX ADMIN — Medication 266 MILLIGRAM(S): at 21:00

## 2023-04-11 RX ADMIN — SODIUM CHLORIDE 80 MILLILITER(S): 9 INJECTION, SOLUTION INTRAVENOUS at 22:49

## 2023-04-11 RX ADMIN — HEPARIN SODIUM 5000 UNIT(S): 5000 INJECTION INTRAVENOUS; SUBCUTANEOUS at 05:02

## 2023-04-11 RX ADMIN — LEVETIRACETAM 400 MILLIGRAM(S): 250 TABLET, FILM COATED ORAL at 17:28

## 2023-04-11 RX ADMIN — Medication 18.75 MICROGRAM(S): at 21:01

## 2023-04-11 RX ADMIN — PIPERACILLIN AND TAZOBACTAM 25 GRAM(S): 4; .5 INJECTION, POWDER, LYOPHILIZED, FOR SOLUTION INTRAVENOUS at 13:43

## 2023-04-11 RX ADMIN — LEVETIRACETAM 440 MILLIGRAM(S): 250 TABLET, FILM COATED ORAL at 12:25

## 2023-04-11 RX ADMIN — DEXMEDETOMIDINE HYDROCHLORIDE IN 0.9% SODIUM CHLORIDE 2.04 MICROGRAM(S)/KG/HR: 4 INJECTION INTRAVENOUS at 10:47

## 2023-04-11 RX ADMIN — Medication 266 MILLIGRAM(S): at 13:39

## 2023-04-11 NOTE — CONSULT NOTE ADULT - ASSESSMENT
68 y/o male PMH Bipolar disease (diagnosed 2005), CKD3, hypothyroidism, Urinary retention, insomnia, who presented with altered mental status with increased weakness and decreased po intake. Changes in mental status started about 3.5 weeks ago. Unclear etiology of AMS, workup so far unrevealing.     Ideally would do MRI and lumbar puncture to evaluate for infectious encephalitis, although wife has refused this. Bacterial meningitis seems unlikely given prolonged duration of symptoms, but cannot rule out encephalitis. Would consider managing for treatable causes in particular HSV encephalitis, in this case the benefits of treating likely outweigh the benefits. EEG report from today showed sharp waves, focal, left temporal. He had a fever on 4/10 but fever curve improved since. He has leukocytosis of 13 although WBC stable. Multiple blood cultures currently no growth.    -suggest acyclovir 10mg/kg IV q8h  -monitor renal function, LFT's  -suggest LP and MRI brain if family is willing  -suggest syphilis screen    Thank you for courtesy of this consult.     Will follow.  Discussed with the primary team.     Jenna Powell MD  Division of Infectious Diseases   Cell [phone] between 8am and 6pm   After 6pm and weekends please call ID service at [phone].  68 y/o male PMH Bipolar disease (diagnosed 2005), CKD3, hypothyroidism, Urinary retention, insomnia, who presented with altered mental status with increased weakness and decreased po intake. Changes in mental status started about 3.5 weeks ago. Unclear etiology of AMS, workup so far unrevealing.     Ideally would do MRI and lumbar puncture to evaluate for infectious encephalitis, although wife has refused this. Bacterial meningitis seems unlikely given prolonged duration of symptoms, but cannot rule out encephalitis. Would consider managing for treatable causes in particular HSV encephalitis, in this case the benefits of treating likely outweigh the benefits. EEG report from today showed sharp waves, focal, left temporal. He had a fever on 4/10 but fever curve improved since. He has leukocytosis of 13 although WBC stable. Multiple blood cultures and urine cultures currently no growth.    -suggest acyclovir 10mg/kg IV q8h  -suggest discontinuing Zosyn  -monitor WBC--if remains elevated consider CT chest and A/P  -monitor renal function, LFT's  -suggest LP and MRI brain if family is willing  -suggest syphilis screen    Thank you for courtesy of this consult.     Will follow.  Discussed with the primary team.     Jenna Powell MD  Division of Infectious Diseases   Cell [phone] between 8am and 6pm   After 6pm and weekends please call ID service at [phone].  68 y/o male PMH Bipolar disease (diagnosed 2005), CKD3, hypothyroidism, Urinary retention, insomnia, who presented with altered mental status with increased weakness and decreased po intake. Changes in mental status started about 3.5 weeks ago. Unclear etiology of AMS, workup so far unrevealing.     Ideally would do MRI and lumbar puncture to evaluate for infectious encephalitis, although wife has refused this. Bacterial meningitis seems unlikely given prolonged duration of symptoms, but cannot rule out encephalitis. Would consider managing for treatable causes in particular HSV encephalitis, in this case the benefits of treating likely outweigh the benefits. EEG report from today showed sharp waves, focal, left temporal. He had a fever on 4/10 but fever curve improved since. He has leukocytosis of 13 although WBC stable. Multiple blood cultures and urine cultures currently no growth.    -suggest acyclovir 10mg/kg IV q8h  -suggest discontinuing Zosyn, observe off antibiotics  -monitor WBC, renal function, LFT's  -suggest LP and MRI brain if family is willing  -suggest syphilis screen  -discussed with wife Alejandra on the phone    Thank you for courtesy of this consult.     Will follow.  Discussed with the primary team.     Jenna Powell MD  Division of Infectious Diseases   Cell [phone] between 8am and 6pm   After 6pm and weekends please call ID service at [phone].  66 y/o male PMH Bipolar disease (diagnosed 2005), CKD3, hypothyroidism, Urinary retention, insomnia, who presented with altered mental status with increased weakness and decreased po intake. Changes in mental status started about 3.5 weeks ago. Unclear etiology of AMS. Concern for lithium toxicity vs autoimmune vs paraneoplastic encephalopathy vs seizures. Lithium levels now wnl.    Ideally would do MRI and lumbar puncture to evaluate for infectious encephalitis, although wife has refused this. Bacterial meningitis unlikely given prolonged duration of symptoms, but cannot rule out encephalitis. Would consider managing for treatable causes in particular HSV encephalitis, in this case the benefits of treating likely outweigh the benefits. EEG report from today showed sharp waves, focal, left temporal. He had a fever on 4/10 but fever curve improved since. He has leukocytosis of 13 although WBC stable. Multiple blood cultures and urine cultures currently no growth.    -suggest acyclovir 10mg/kg IV q8h  -suggest discontinuing Zosyn, observe off antibiotics  -monitor WBC, renal function, LFT's  -suggest LP and MRI brain if family is willing  -suggest syphilis screen  -discussed with wife Alejandra on the phone, she is agreeable with IV acyclovir until she makes a decision regarding pursuit of LP    Thank you for courtesy of this consult.     Will follow.  Discussed with the primary team.     Jenna Powell MD  Division of Infectious Diseases   Cell [phone] between 8am and 6pm   After 6pm and weekends please call ID service at [phone].

## 2023-04-11 NOTE — BH CONSULTATION LIAISON ASSESSMENT NOTE - NSBHCHARTREVIEWLAB_PSY_A_CORE FT
11.9   13.39 )-----------( 168      ( 11 Apr 2023 05:54 )             36.0   04-11    146<H>  |  119<H>  |  14  ----------------------------<  143<H>  3.4<L>   |  25  |  1.30    Ca    9.1      11 Apr 2023 05:54  Phos  2.6     04-10  Mg     2.0     04-10    TPro  5.9<L>  /  Alb  3.1<L>  /  TBili  0.6  /  DBili  x   /  AST  59<H>  /  ALT  47  /  AlkPhos  105  04-11

## 2023-04-11 NOTE — BH CONSULTATION LIAISON ASSESSMENT NOTE - NSBHCHARTREVIEWVS_PSY_A_CORE FT
Vital Signs Last 24 Hrs  T(C): 36.8 (11 Apr 2023 08:07), Max: 37 (10 Apr 2023 12:04)  T(F): 98.2 (11 Apr 2023 08:07), Max: 98.6 (10 Apr 2023 12:04)  HR: 42 (11 Apr 2023 09:45) (41 - 90)  BP: 129/60 (11 Apr 2023 09:30) (90/50 - 166/81)  BP(mean): 87 (11 Apr 2023 09:30) (64 - 115)  RR: 19 (11 Apr 2023 09:45) (14 - 59)  SpO2: 99% (11 Apr 2023 09:45) (93% - 100%)    Parameters below as of 11 Apr 2023 08:30    O2 Flow (L/min): 0.3

## 2023-04-11 NOTE — PROGRESS NOTE ADULT - SUBJECTIVE AND OBJECTIVE BOX
Patient is a 67y old  Male who presents with a chief complaint of AMS (10 Apr 2023 05:03)      INTERVAL HPI/OVERNIGHT EVENTS: Events noted.    Vital Signs Last 24 Hrs  T(C): 36.7 (2023 12:08), Max: 36.9 (10 Apr 2023 16:30)  T(F): 98.1 (2023 12:08), Max: 98.5 (10 Apr 2023 16:30)  HR: 96 (2023 14:00) (41 - 101)  BP: 130/70 (2023 14:00) (90/50 - 185/84)  BP(mean): 90 (2023 14:00) (64 - 120)  RR: 39 (2023 14:00) (14 - 59)  SpO2: 98% (2023 14:00) (93% - 100%)    Parameters below as of 2023 08:30    O2 Flow (L/min): 0.3      04-11    146<H>  |  119<H>  |  14  ----------------------------<  143<H>  3.4<L>   |  25  |  1.30    Ca    9.1      2023 05:54  Phos  2.6     04-10  Mg     2.0     04-10    TPro  5.9<L>  /  Alb  3.1<L>  /  TBili  0.6  /  DBili  x   /  AST  59<H>  /  ALT  47  /  AlkPhos  105  04-11                          11.9   13.39 )-----------( 168      ( 2023 05:54 )             36.0       CAPILLARY BLOOD GLUCOSE      EEG REPORT:   EEG Report:  · EEG Report	     REPORT OF ROUTINE EEG WITH VIDEO    [facility]: [address] [facility] , [address] [place] [address], Phone: [phone]  [facility]: [id number] [place] [address], Phone: [phone]  Office: [address] [place] [address], [place] [address], [place] [address], Phone: [phone]    Patient Name: MAXI FANG  Age: 67 year  : 1955  MRN #: [id number], Location: [facility]  Referring Physician: KAEL CARPENTER    EEG #: [id number]  Study Date: 4/10/2023   Start Time: 2:15:25 PM     Study Duration: 34.5    Technical Information:					  On Instrument: [id number]  Placement and Labeling of Electrodes:  The EEG was performed utilizing 20 channels referential EEG connections (coronal over temporal over parasagittal montage) using all standard 10-20 electrode placements with EKG.  Recording was at a sampling rate of 256 samples per second per channel.  Time synchronized digital video recording was done simultaneously with EEG recording.  A low light infrared camera was used for low light recording.  Rashaad and seizure detection algorithms were utilized.  CSA Technical Component:  Quantitative EEG analysis using a separate Compressed Spectral Array (CSA) software package was conducted in real-time and run at bedside after set up by the technician, digitally displaying the power of electrographic frequencies included in the 1-30Hz band using a graded color map.  This data was reviewed and interpreted independently, and is reported in a separate section below.    History:  -      Medication  Zosyn  Zofran  Synthroid  Precedex  Haldol  Heparin    Study Interpretation:    FINDINGS:  The background was continuous, but not spontaneously variable.  Wakefulness did not occur. A posterior dominant rhythm is not present.    Background Slowing:  Generalized slowing: the background consists of delta activity diffusely with intermixed theta.   Focal slowing: none was present.    Sleep Background:  -Stage II sleep transients were not recorded.    Non-epileptiform activity:  None.    Epileptiform Activity:   Sharp waves, focal, left temporal     Events:  No clinical events were recorded.  No seizures were recorded.    Activation Procedures:   -Hyperventilation was not performed.    -Photic stimulation was performed and did not elicit any abnormalities.      Artifacts:  Intermittent myogenic and movement artifacts were noted.    ECG:  The heart rate on single channel ECG was predominantly between 50-60 BPM.    EEG Classification / Summary:    Abnormal EEG in the awake, drowsy and asleep states.  1.	Sharp waves, focal, left temporal   2.	Background slowing, generalized, moderate    Clinical Impression:    Potential epileptogenic focus in the left temporal region.  Moderate nonspecific diffuse or multifocal cerebral dysfunction.   No seizure recorded.  ________________________________________       Mukesh Dubois MD, PhD  Director, Epilepsy Division, [facility]  ------------------------------------  EEG Reading Room: [phone]  On Call Service After Hours: [phone]    		            Electronic Signatures:  Mukesh Dubois)  (Signed 2023 07:17)  	Authored: EEG REPORT      Last Updated: 2023 07:17 by Mukesh Dubois)            acyclovir IVPB      acyclovir IVPB 800 milliGRAM(s) IV Intermittent every 8 hours  dexMEDEtomidine Infusion 0.1 MICROgram(s)/kG/Hr IV Continuous <Continuous>  dextrose 5%. 1000 milliLiter(s) IV Continuous <Continuous>  enoxaparin Injectable 40 milliGRAM(s) SubCutaneous every 24 hours  haloperidol    Injectable 1 milliGRAM(s) IntraMuscular every 6 hours PRN  levETIRAcetam  IVPB 500 milliGRAM(s) IV Intermittent every 12 hours  levothyroxine Injectable 18.75 MICROGram(s) IV Push at bedtime  ondansetron Injectable 4 milliGRAM(s) IV Push every 8 hours PRN        REVIEW OF SYSTEMS: unable      Consultant(s) Notes Reviewed:  [X] YES  [ ] NO    PHYSICAL EXAM:  GENERAL: NAD  HEAD:  Atraumatic, Normocephalic  EYES: EOMI, PERRLA, conjunctiva and sclera clear  ENMT: No tonsillar erythema, exudates, or enlargement; Moist mucous membranes  NECK: Supple, No JVD  NERVOUS SYSTEM:  agitated  CHEST/LUNG: Clear to auscultation bilaterally; No rales, rhonchi, wheezing,  HEART: Regular rate and rhythm  ABDOMEN: Soft, Nontender, Nondistended; Bowel sounds present  EXTREMITIES:  No clubbing, cyanosis, or edema  LYMPH: No lymphadenopathy noted  SKIN: No rashes      Advanced care planning discussed with patient/family [X] YES   [ ] NO    Advanced care planning discussed with patient/family. Patient's health status was discussed. All appropriate changes have been made regarding patient's end-of-life care. Advanced care planning forms reviewed/discussed/completed.  20 minutes spent.

## 2023-04-11 NOTE — PHARMACOTHERAPY INTERVENTION NOTE - COMMENTS
Patient is ordered for levothyroxine 12.5mcg IV push (home dose 25 mcg). Discussed w/ Dr. Kaur and recommended increasing dose to 18.75mcg IV push (75% of PO dose). MD accepted and order was updated.  Patient is ordered for levothyroxine 12.5mcg IV push (home dose 25 mcg). Discussed w/ Dr. Jin and recommended increasing dose to 18.75mcg IV push (75% of PO dose). MD accepted and order was updated.

## 2023-04-11 NOTE — CONSULT NOTE ADULT - SUBJECTIVE AND OBJECTIVE BOX
[facility]  INFECTIOUS DISEASES - Crystal Whiteside, Andre  [address] [place] [address]  Tel: [phone]     Fax: [phone]  =======================================================    N-[id number]  MXAI FANG     CC: Patient is a 67y old  Male who presents with a chief complaint of AMS (10 Apr 2023 14:52)    HPI:  68 y/o male PMH Bipolar disease (diagnosed 2005), CKD3, hypothyroidism, Urinary retention, insomnia, who presented with altered mental status with increased weakness and decreased po intake. Patient unable to give any history. Per records about 3.5 weeks ago patient developed episode of confusion. He had an outpt MRI and was told was normal by outpt neuro. Symptoms continued until late March he had another episode of severe confusion, leading to him being hospitalized at [facility] for 4 days. Once home he still had confusion and 1wk ago appeared to be manic. He was refusing to sleep or listen to his family, and throwing things all over the place. He had a fever of 100.9 on 4/10 but fever improved since.    PAST MEDICAL & SURGICAL HISTORY:  Bipolar disorder      Stage 3 chronic kidney disease      Hard of hearing      Hypothyroidism      Urinary retention      Insomnia      H/O hernia repair          Social Hx:     FAMILY HISTORY:  FH: Parkinson's disease (Mother)    FH: Alzheimers disease (Mother)        Allergies    No Known Allergies    Intolerances        Antibiotics:  MEDICATIONS  (STANDING):  acyclovir IVPB      acyclovir IVPB 800 milliGRAM(s) IV Intermittent once  acyclovir IVPB 800 milliGRAM(s) IV Intermittent every 8 hours  dexMEDEtomidine Infusion 0.1 MICROgram(s)/kG/Hr (2.04 mL/Hr) IV Continuous <Continuous>  dextrose 5%. 1000 milliLiter(s) (80 mL/Hr) IV Continuous <Continuous>  enoxaparin Injectable 40 milliGRAM(s) SubCutaneous every 24 hours  levETIRAcetam  IVPB 500 milliGRAM(s) IV Intermittent every 12 hours  levothyroxine Injectable 18.75 MICROGram(s) IV Push at bedtime  piperacillin/tazobactam IVPB.. 3.375 Gram(s) IV Intermittent every 8 hours    MEDICATIONS  (PRN):  haloperidol    Injectable 1 milliGRAM(s) IntraMuscular every 6 hours PRN Agitation  ondansetron Injectable 4 milliGRAM(s) IV Push every 8 hours PRN Nausea and/or Vomiting       REVIEW OF SYSTEMS:  CONSTITUTIONAL:  No Fever or chills  HEENT:  No sore throat or runny nose.  CARDIOVASCULAR:  No chest pain or SOB.  RESPIRATORY:  No cough, shortness of breath  GASTROINTESTINAL:  No nausea, vomiting or diarrhea.  GENITOURINARY:  No dysuria, frequency or urgency  MUSCULOSKELETAL:  no joint aches, no muscle pain  SKIN:  No change in skin, hair or nails.  NEUROLOGIC:  No headache or dizziness  PSYCHIATRIC:  No disorder of thought or mood.    Physical Exam:  Vital Signs Last 24 Hrs  T(C): 36.7 (11 Apr 2023 12:08), Max: 36.9 (10 Apr 2023 16:30)  T(F): 98.1 (11 Apr 2023 12:08), Max: 98.5 (10 Apr 2023 16:30)  HR: 86 (11 Apr 2023 12:30) (41 - 90)  BP: 146/97 (11 Apr 2023 12:30) (90/50 - 185/84)  BP(mean): 114 (11 Apr 2023 12:30) (64 - 120)  RR: 44 (11 Apr 2023 12:30) (14 - 59)  SpO2: 100% (11 Apr 2023 12:30) (93% - 100%)    Parameters below as of 11 Apr 2023 08:30    O2 Flow (L/min): 0.3      GEN: NAD  HEENT: normocephalic and atraumatic.   NECK: Supple.   LUNGS: Clear to auscultation.  HEART: Regular rate and rhythm   ABDOMEN: Soft, nontender, and nondistended.    : No CVA tenderness  EXTREMITIES: No leg edema.  NEUROLOGIC: grossly intact.  PSYCHIATRIC: Appropriate affect .  SKIN: No ulceration or induration present.    Labs:  04-11    146<H>  |  119<H>  |  14  ----------------------------<  143<H>  3.4<L>   |  25  |  1.30    Ca    9.1      11 Apr 2023 05:54  Phos  2.6     04-10  Mg     2.0     04-10    TPro  5.9<L>  /  Alb  3.1<L>  /  TBili  0.6  /  DBili  x   /  AST  59<H>  /  ALT  47  /  AlkPhos  105  04-11                          11.9   13.39 )-----------( 168      ( 11 Apr 2023 05:54 )             36.0         LIVER FUNCTIONS - ( 11 Apr 2023 05:54 )  Alb: 3.1 g/dL / Pro: 5.9 g/dL / ALK PHOS: 105 U/L / ALT: 47 U/L / AST: 59 U/L / GGT: x           CARDIAC MARKERS ( 10 Apr 2023 12:40 )  x     / x     / 1374 U/L / x     / 9.4 ng/mL  CARDIAC MARKERS ( 10 Apr 2023 02:15 )  x     / x     / 1922 U/L / x     / 13.8 ng/mL      ABG - ( 10 Apr 2023 02:07 )  pH, Arterial: 7.37  pH, Blood: x     /  pCO2: 37    /  pO2: 90    / HCO3: 21    / Base Excess: -3.9  /  SaO2: 99.5              Procalcitonin, Serum: 0.06 ng/mL (04-10-23 @ 02:15)  Procalcitonin, Serum: 0.08 ng/mL (04-08-23 @ 16:09)        SARS-CoV-2 Result: NotDetec (04-08-23 @ 07:00)      RECENT CULTURES:  04-10 @ 03:48 .Blood Blood-Peripheral     No growth to date.        04-10 @ 03:45 .Blood Blood-Peripheral     No growth to date.        04-08 @ 10:40 Clean Catch Clean Catch (Midstream)     No growth        04-08 @ 07:00 .Blood Blood-Peripheral     No growth to date.              All imaging and other data have been reviewed.    CT head:  Parenchymal volume loss is identified    There is no acute hemorrhage mass or mass effect seen.    Evaluation of the osseous structures with the appropriate window appears   unremarkable    Minimal mucosal thickening seen involving both ethmoid sinus    Sclerotic changes seen involving both mastoid regions likely compatible   chronic plantar change.    IMPRESSION: This exam is limited by motion. Grossly, no acute hemorrhage   mass or mass effect seen. Better  CT is recommended when   patient can tolerate it or sedation be given.      CXR:  FINDINGS:    The heart is not enlarged.  The lungs are clear.  There are no pleural effusions.  There is no pneumothorax.    IMPRESSION:    No acute pulmonary disease [facility]  INFECTIOUS DISEASES - Crystal Whiteside, Andre  [address] [place] [address]  Tel: [phone]     Fax: [phone]  =======================================================    N-[id number]  MAXI FANG     CC: Patient is a 67y old  Male who presents with a chief complaint of AMS (10 Apr 2023 14:52)    HPI:  68 y/o male PMH Bipolar disease (diagnosed 2005), CKD3, hypothyroidism, Urinary retention, insomnia, who presented with altered mental status with increased weakness and decreased po intake. Patient unable to give any history. Per records about 3.5 weeks ago patient developed episode of confusion. Per Wife Alejandra she thinks patient has been more confused for almost a month. She says he is retired and has not been outdoors especially over the past month. He had an outpt MRI and was told was normal by outpt neuro. Symptoms continued until late March he had another episode of severe confusion, leading to him being hospitalized at [facility] for 4 days. Once home he still had confusion and 1wk ago appeared to be manic. He was refusing to sleep or listen to his family, and throwing things all over the place. He had a fever of 100.9 on 4/10 but fever improved since.    PAST MEDICAL & SURGICAL HISTORY:  Bipolar disorder      Stage 3 chronic kidney disease      Hard of hearing      Hypothyroidism      Urinary retention      Insomnia      H/O hernia repair          Social Hx:     FAMILY HISTORY:  FH: Parkinson's disease (Mother)    FH: Alzheimers disease (Mother)        Allergies    No Known Allergies    Intolerances        Antibiotics:  MEDICATIONS  (STANDING):  acyclovir IVPB      acyclovir IVPB 800 milliGRAM(s) IV Intermittent once  acyclovir IVPB 800 milliGRAM(s) IV Intermittent every 8 hours  dexMEDEtomidine Infusion 0.1 MICROgram(s)/kG/Hr (2.04 mL/Hr) IV Continuous <Continuous>  dextrose 5%. 1000 milliLiter(s) (80 mL/Hr) IV Continuous <Continuous>  enoxaparin Injectable 40 milliGRAM(s) SubCutaneous every 24 hours  levETIRAcetam  IVPB 500 milliGRAM(s) IV Intermittent every 12 hours  levothyroxine Injectable 18.75 MICROGram(s) IV Push at bedtime  piperacillin/tazobactam IVPB.. 3.375 Gram(s) IV Intermittent every 8 hours    MEDICATIONS  (PRN):  haloperidol    Injectable 1 milliGRAM(s) IntraMuscular every 6 hours PRN Agitation  ondansetron Injectable 4 milliGRAM(s) IV Push every 8 hours PRN Nausea and/or Vomiting       REVIEW OF SYSTEMS:  CONSTITUTIONAL:  No Fever or chills  HEENT:  No sore throat or runny nose.  CARDIOVASCULAR:  No chest pain or SOB.  RESPIRATORY:  No cough, shortness of breath  GASTROINTESTINAL:  No nausea, vomiting or diarrhea.  GENITOURINARY:  No dysuria, frequency or urgency  MUSCULOSKELETAL:  no joint aches, no muscle pain  SKIN:  No change in skin, hair or nails.  NEUROLOGIC:  No headache or dizziness  PSYCHIATRIC:  No disorder of thought or mood.    Physical Exam:  Vital Signs Last 24 Hrs  T(C): 36.7 (11 Apr 2023 12:08), Max: 36.9 (10 Apr 2023 16:30)  T(F): 98.1 (11 Apr 2023 12:08), Max: 98.5 (10 Apr 2023 16:30)  HR: 86 (11 Apr 2023 12:30) (41 - 90)  BP: 146/97 (11 Apr 2023 12:30) (90/50 - 185/84)  BP(mean): 114 (11 Apr 2023 12:30) (64 - 120)  RR: 44 (11 Apr 2023 12:30) (14 - 59)  SpO2: 100% (11 Apr 2023 12:30) (93% - 100%)    Parameters below as of 11 Apr 2023 08:30    O2 Flow (L/min): 0.3      GEN: NAD  HEENT: normocephalic and atraumatic.   NECK: Supple.   LUNGS: Clear to auscultation.  HEART: Regular rate and rhythm   ABDOMEN: Soft, nontender, and nondistended.    : No CVA tenderness  EXTREMITIES: No leg edema.  NEUROLOGIC: grossly intact.  PSYCHIATRIC: Appropriate affect .  SKIN: No ulceration or induration present.    Labs:  04-11    146<H>  |  119<H>  |  14  ----------------------------<  143<H>  3.4<L>   |  25  |  1.30    Ca    9.1      11 Apr 2023 05:54  Phos  2.6     04-10  Mg     2.0     04-10    TPro  5.9<L>  /  Alb  3.1<L>  /  TBili  0.6  /  DBili  x   /  AST  59<H>  /  ALT  47  /  AlkPhos  105  04-11                          11.9   13.39 )-----------( 168      ( 11 Apr 2023 05:54 )             36.0         LIVER FUNCTIONS - ( 11 Apr 2023 05:54 )  Alb: 3.1 g/dL / Pro: 5.9 g/dL / ALK PHOS: 105 U/L / ALT: 47 U/L / AST: 59 U/L / GGT: x           CARDIAC MARKERS ( 10 Apr 2023 12:40 )  x     / x     / 1374 U/L / x     / 9.4 ng/mL  CARDIAC MARKERS ( 10 Apr 2023 02:15 )  x     / x     / 1922 U/L / x     / 13.8 ng/mL      ABG - ( 10 Apr 2023 02:07 )  pH, Arterial: 7.37  pH, Blood: x     /  pCO2: 37    /  pO2: 90    / HCO3: 21    / Base Excess: -3.9  /  SaO2: 99.5              Procalcitonin, Serum: 0.06 ng/mL (04-10-23 @ 02:15)  Procalcitonin, Serum: 0.08 ng/mL (04-08-23 @ 16:09)        SARS-CoV-2 Result: NotDetec (04-08-23 @ 07:00)      RECENT CULTURES:  04-10 @ 03:48 .Blood Blood-Peripheral     No growth to date.        04-10 @ 03:45 .Blood Blood-Peripheral     No growth to date.        04-08 @ 10:40 Clean Catch Clean Catch (Midstream)     No growth        04-08 @ 07:00 .Blood Blood-Peripheral     No growth to date.              All imaging and other data have been reviewed.    CT head:  Parenchymal volume loss is identified    There is no acute hemorrhage mass or mass effect seen.    Evaluation of the osseous structures with the appropriate window appears   unremarkable    Minimal mucosal thickening seen involving both ethmoid sinus    Sclerotic changes seen involving both mastoid regions likely compatible   chronic plantar change.    IMPRESSION: This exam is limited by motion. Grossly, no acute hemorrhage   mass or mass effect seen. Better  CT is recommended when   patient can tolerate it or sedation be given.      CXR:  FINDINGS:    The heart is not enlarged.  The lungs are clear.  There are no pleural effusions.  There is no pneumothorax.    IMPRESSION:    No acute pulmonary disease [facility]  INFECTIOUS DISEASES - Crystal Whiteside, Andre  [address] [place] [address]  Tel: [phone]     Fax: [phone]  =======================================================    N-[id number]  MAXI FANG     CC: Patient is a 67y old  Male who presents with a chief complaint of AMS (10 Apr 2023 14:52)    HPI:  66 y/o male PMH Bipolar disease (diagnosed 2005), CKD3, hypothyroidism, Urinary retention, insomnia, who presented with altered mental status with increased weakness and decreased po intake. Patient unable to give any history. Per records about 3.5 weeks ago patient developed episode of confusion. Per Wife Alejandra she thinks patient has been more confused for almost a month. She says he is retired and has not been outdoors especially over the past month. He had an outpt MRI and was told was normal by outpt neuro. Symptoms continued until late March he had another episode of severe confusion, leading to him being hospitalized at [facility] for 4 days. Once home he still had confusion and 1wk ago appeared to be manic. He was refusing to sleep or listen to his family, and throwing things all over the place. He had a fever of 100.9 on 4/10 but fever improved since.    PAST MEDICAL & SURGICAL HISTORY:  Bipolar disorder      Stage 3 chronic kidney disease      Hard of hearing      Hypothyroidism      Urinary retention      Insomnia      H/O hernia repair          Social Hx:     FAMILY HISTORY:  FH: Parkinson's disease (Mother)    FH: Alzheimers disease (Mother)        Allergies    No Known Allergies    Intolerances        Antibiotics:  MEDICATIONS  (STANDING):  acyclovir IVPB      acyclovir IVPB 800 milliGRAM(s) IV Intermittent once  acyclovir IVPB 800 milliGRAM(s) IV Intermittent every 8 hours  dexMEDEtomidine Infusion 0.1 MICROgram(s)/kG/Hr (2.04 mL/Hr) IV Continuous <Continuous>  dextrose 5%. 1000 milliLiter(s) (80 mL/Hr) IV Continuous <Continuous>  enoxaparin Injectable 40 milliGRAM(s) SubCutaneous every 24 hours  levETIRAcetam  IVPB 500 milliGRAM(s) IV Intermittent every 12 hours  levothyroxine Injectable 18.75 MICROGram(s) IV Push at bedtime  piperacillin/tazobactam IVPB.. 3.375 Gram(s) IV Intermittent every 8 hours    MEDICATIONS  (PRN):  haloperidol    Injectable 1 milliGRAM(s) IntraMuscular every 6 hours PRN Agitation  ondansetron Injectable 4 milliGRAM(s) IV Push every 8 hours PRN Nausea and/or Vomiting       REVIEW OF SYSTEMS:  Unable to obtain 2/2 mental status    Physical Exam:  Vital Signs Last 24 Hrs  T(C): 36.7 (11 Apr 2023 12:08), Max: 36.9 (10 Apr 2023 16:30)  T(F): 98.1 (11 Apr 2023 12:08), Max: 98.5 (10 Apr 2023 16:30)  HR: 86 (11 Apr 2023 12:30) (41 - 90)  BP: 146/97 (11 Apr 2023 12:30) (90/50 - 185/84)  BP(mean): 114 (11 Apr 2023 12:30) (64 - 120)  RR: 44 (11 Apr 2023 12:30) (14 - 59)  SpO2: 100% (11 Apr 2023 12:30) (93% - 100%)    Parameters below as of 11 Apr 2023 08:30    O2 Flow (L/min): 0.3    GEN: lethargic, not in apparent distress  HEENT: normocephalic and atraumatic.  NECK: Supple.   LUNGS: Clear to auscultation.  HEART: Regular rate and rhythm   ABDOMEN: Soft, nontender, and nondistended.    EXTREMITIES: No leg edema.  NEUROLOGIC: unable to assess 2/2 mental status      Labs:  04-11    146<H>  |  119<H>  |  14  ----------------------------<  143<H>  3.4<L>   |  25  |  1.30    Ca    9.1      11 Apr 2023 05:54  Phos  2.6     04-10  Mg     2.0     04-10    TPro  5.9<L>  /  Alb  3.1<L>  /  TBili  0.6  /  DBili  x   /  AST  59<H>  /  ALT  47  /  AlkPhos  105  04-11                          11.9   13.39 )-----------( 168      ( 11 Apr 2023 05:54 )             36.0         LIVER FUNCTIONS - ( 11 Apr 2023 05:54 )  Alb: 3.1 g/dL / Pro: 5.9 g/dL / ALK PHOS: 105 U/L / ALT: 47 U/L / AST: 59 U/L / GGT: x           CARDIAC MARKERS ( 10 Apr 2023 12:40 )  x     / x     / 1374 U/L / x     / 9.4 ng/mL  CARDIAC MARKERS ( 10 Apr 2023 02:15 )  x     / x     / 1922 U/L / x     / 13.8 ng/mL      ABG - ( 10 Apr 2023 02:07 )  pH, Arterial: 7.37  pH, Blood: x     /  pCO2: 37    /  pO2: 90    / HCO3: 21    / Base Excess: -3.9  /  SaO2: 99.5              Procalcitonin, Serum: 0.06 ng/mL (04-10-23 @ 02:15)  Procalcitonin, Serum: 0.08 ng/mL (04-08-23 @ 16:09)        SARS-CoV-2 Result: NotDetec (04-08-23 @ 07:00)      RECENT CULTURES:  04-10 @ 03:48 .Blood Blood-Peripheral     No growth to date.        04-10 @ 03:45 .Blood Blood-Peripheral     No growth to date.        04-08 @ 10:40 Clean Catch Clean Catch (Midstream)     No growth        04-08 @ 07:00 .Blood Blood-Peripheral     No growth to date.              All imaging and other data have been reviewed.    CT head:  Parenchymal volume loss is identified    There is no acute hemorrhage mass or mass effect seen.    Evaluation of the osseous structures with the appropriate window appears   unremarkable    Minimal mucosal thickening seen involving both ethmoid sinus    Sclerotic changes seen involving both mastoid regions likely compatible   chronic plantar change.    IMPRESSION: This exam is limited by motion. Grossly, no acute hemorrhage   mass or mass effect seen. Better  CT is recommended when   patient can tolerate it or sedation be given.      CXR:  FINDINGS:    The heart is not enlarged.  The lungs are clear.  There are no pleural effusions.  There is no pneumothorax.    IMPRESSION:    No acute pulmonary disease

## 2023-04-11 NOTE — PROGRESS NOTE ADULT - SUBJECTIVE AND OBJECTIVE BOX
Neurology Follow up note    MAXI [id number]    HPI:  68 y/o male PMH BPD (diagnosed 2005), CKD3, hypothyroidism Urinary retention and insomnia presents to the ED for altered mental status with increased weakness and decreased po intake. Patient unable to answer questions secondary to mental status. History obtained from patient's wife and son via telephone. Patient was recently admitted to [facility] for a manic episode ~3 weeks ago. There his dose of Bupropion was decreased 150mg TID to once daily, he also had a normal CT head and was diagnosed with CKD3.  Soon after discharge, patient began to have increased weakness, unsteady gait with multiple mechanical falls, slurred speech, and decreased po intake. These symptoms seemed to have a rapid onset ~10 days and have not improved. His son also notes, he seemed to have pain with swallowing as well.   Of note, patient's mom also had hx of Alzheimer's dementia and parkinson's disease which she was diagnosed with in her 70s and family is concerned he might be developing a neurological disorder.   At time of exam, patient unable to answer questions or follow commands.     ED Course  T: 96.6F HR 98 /83 RR 20 SpO2 98% on RA  Labs:  wbc 12.30 PT/INR 13.9/1.19 BUN/Cr 24/1.90  UA: negative  CXR: grossly clear (f/u official read)  EKG: NSR HR 80  CT Head: Grossly, no acute hemorrhage mass or mass effect seen. Better  CT is recommended when patient can tolerate it or sedation be given.    In the ED, pt given NS 1L bolus x1, Ativan 1mg IVPx1   (08 Apr 2023 13:21)      Interval History -no new events    Patient is seen, chart was reviewed and case was discussed with the treatment team.  Pt is not in any distress.   Lying on bed comfortably.       Vital Signs Last 24 Hrs  T(C): 36.7 (11 Apr 2023 12:08), Max: 36.9 (10 Apr 2023 16:30)  T(F): 98.1 (11 Apr 2023 12:08), Max: 98.5 (10 Apr 2023 16:30)  HR: 83 (11 Apr 2023 15:30) (41 - 101)  BP: 106/58 (11 Apr 2023 15:30) (90/50 - 185/84)  BP(mean): 75 (11 Apr 2023 15:30) (64 - 120)  RR: 29 (11 Apr 2023 15:30) (14 - 57)  SpO2: 97% (11 Apr 2023 15:30) (93% - 100%)    Parameters below as of 11 Apr 2023 08:30    O2 Flow (L/min): 0.3  r            REVIEW OF SYSTEMS:      limited due to poor mentation  not in any distress/pain  On Neurological Examination:    Mental Status - Pt is lethargic but easily arousible  not following simple commands    Speech -  Pt has dysarthria.    Cranial Nerves - Pupils 3 mm equal and reactive to light, extraocular eye movements intact. Pt has no visual field deficit.  Pt has no  facial asymmetry. Facial sensation is intact.Tongue - is in midline.    Muscle tone - is normal      Motor Exam - 4/5 all over, No drift. No shaking or tremors.    Sensory Exam - Pt withdraws all extremities equally on stimulation. No asymmetry seen. No complaints of tingling, numbness.    coordination:    Finger to nose: normal    Deep tendon Reflexes - 2 plus all over.          Neck Supple -  Yes.     MEDICATIONS    dexMEDEtomidine Infusion 0.1 MICROgram(s)/kG/Hr IV Continuous <Continuous>  dextrose 5% + sodium chloride 0.45%. 1000 milliLiter(s) IV Continuous <Continuous>  haloperidol    Injectable 1 milliGRAM(s) IntraMuscular every 6 hours PRN  heparin   Injectable 5000 Unit(s) SubCutaneous every 8 hours  levothyroxine Injectable 12.5 MICROGram(s) IV Push at bedtime  ondansetron Injectable 4 milliGRAM(s) IV Push every 8 hours PRN  piperacillin/tazobactam IVPB.- 3.375 Gram(s) IV Intermittent once  piperacillin/tazobactam IVPB.. 3.375 Gram(s) IV Intermittent every 8 hours      Allergies    No Known Allergies    Intolerances      04-11    146<H>  |  119<H>  |  14  ----------------------------<  143<H>  3.4<L>   |  25  |  1.30    Ca    9.1      11 Apr 2023 05:54  Phos  2.6     04-10  Mg     2.0     04-10    TPro  5.9<L>  /  Alb  3.1<L>  /  TBili  0.6  /  DBili  x   /  AST  59<H>  /  ALT  47  /  AlkPhos  105  04-11        Hemoglobin A1C:     Vitamin B12 Vitamin B12, Serum: 692 pg/mL (04-10 @ 08:30)      RADIOLOGY    ASSESSMENT AND PLAN:      seen for ams  sp lithium toxicity  still encephalopathic ?non convulsive sz    EEG- left temporal sharp waves  started on keppra  repeat EEG  Trial of acyclovir  BRAIN MRI- family refused  NEED  FOR LP TO R/O AUTOIMMUNE ENCEPHALITIS  management dw critical care team  Pain is accessed and addressed.  Would continue to follow.

## 2023-04-11 NOTE — PROGRESS NOTE ADULT - ASSESSMENT
66 y/o male PMH BPD (diagnosed 2005), CKD3, hypothyroidism Urinary retention and insomnia    Neuro: AMS/ A O x 0 since admission,   4/9 code grey for severe agitation Placed on precedex gtt. Original CT head negative for acute process. Now off of precedex  Suspect lithium toxicity vs autoimmune vs paraneoplastic encephalopathy vs seizures  Lithium levels now wnl   Recommend MRI and LP to r/o autoimmune encephalitis although family not agreeable   Will attempt to obtain discharge summary from wife   EEG report from yesterday showed potential epileptogenic focus in left temporal region, moderate nonspecific diffuse multifocal cerebral dysfunction, no seizures recorded  Per neuro recs, started on keppra  Repeat EEG ordered for this afternoon   Neuro consult appreciated     Cardio: Dany overnight, continue to monitor vitals closely     Pulm: Maintaining O2 sat > 92 % on room air. No other active issues.     GI: strict NPO    Renal: CKD with Scr around baseline, will trend with lytes and replete as needed. Hypernatremic to 149 almost, on D 5 80 cc/hr. Will trend BMP. I/O's. Avoid nephrotoxic agents    Heme: Hep Sub Q for DVT prophylaxis    ID: Wbc improving, low suspicion for infection at this time UA negative, CXR clear, Bcx NGTD. On zosyn for empiric abx coverage--> can dc if okay with ID  higher suspicion for autoimmune vs paraneoplastic encephalitis     Endo; TSH wnl. No other active issues at this time.    68 y/o male PMH BPD (diagnosed 2005), CKD3, hypothyroidism Urinary retention and insomnia    Neuro: AMS/ A O x 0 since admission,   4/9 code grey for severe agitation Placed on precedex gtt. Original CT head negative for acute process. Now off of precedex  Suspect lithium toxicity vs autoimmune vs paraneoplastic encephalopathy vs seizures  Lithium levels now wnl   Recommend MRI and LP to r/o autoimmune encephalitis although family not agreeable   Will attempt to obtain discharge summary from wife   EEG report from yesterday showed potential epileptogenic focus in left temporal region, moderate nonspecific diffuse multifocal cerebral dysfunction, no seizures recorded  Per neuro recs, started on keppra  Repeat EEG ordered for this afternoon   Neuro consult appreciated     Cardio: Dany overnight, continue to monitor vitals closely     Pulm: Maintaining O2 sat > 92 % on room air. No other active issues.     GI: strict NPO    Renal: CKD with Scr around baseline, will trend with lytes and replete as needed. Hypernatremic to 149 almost, on D 5 80 cc/hr. Will trend BMP. I/O's. Avoid nephrotoxic agents    Heme: Hep Sub Q for DVT prophylaxis    ID: Wbc improving, low suspicion for infection at this time UA negative, CXR clear, Bcx NGTD. Can DC zosyn.   higher suspicion for autoimmune vs paraneoplastic encephalitis   will start trial of acyclovir given abnormal EEG findings in temporal lobe     Endo; TSH wnl. No other active issues at this time.    68 y/o male PMH BPD (diagnosed 2005), CKD3, hypothyroidism Urinary retention and insomnia    Neuro: AMS/ A O x 0 since admission,   4/9 code grey for severe agitation Placed on precedex gtt. Original CT head negative for acute process. Now off of precedex  Suspect lithium toxicity vs autoimmune vs paraneoplastic encephalopathy vs seizures  Lithium levels now wnl   Recommend MRI and LP to r/o autoimmune encephalitis although family not agreeable   Will attempt to obtain discharge summary from wife   EEG report from yesterday showed potential epileptogenic focus in left temporal region, moderate nonspecific diffuse multifocal cerebral dysfunction, no seizures recorded  Per neuro recs, started on keppra  Repeat EEG ordered tomorrow AM   Neuro consult appreciated     Cardio: Dany overnight, continue to monitor vitals closely     Pulm: Maintaining O2 sat > 92 % on room air. No other active issues.     GI: strict NPO    Renal: CKD with Scr around baseline, will trend with lytes and replete as needed. Hypernatremic to 149 almost, on D 5 80 cc/hr. Will trend BMP. I/O's. Avoid nephrotoxic agents    Heme: Hep Sub Q for DVT prophylaxis    ID: Wbc improving, low suspicion for infection at this time UA negative, CXR clear, Bcx NGTD. Can DC zosyn.   higher suspicion for autoimmune vs paraneoplastic encephalitis   will start trial of acyclovir given abnormal EEG findings in temporal lobe     Endo; TSH wnl. No other active issues at this time.

## 2023-04-11 NOTE — BH CONSULTATION LIAISON ASSESSMENT NOTE - HPI (INCLUDE ILLNESS QUALITY, SEVERITY, DURATION, TIMING, CONTEXT, MODIFYING FACTORS, ASSOCIATED SIGNS AND SYMPTOMS)
Patient seen, evaluated and chart reviewed. Patient is a 68 y/o male PMH BPD (diagnosed 2005), CKD3, hypothyroidism Urinary retention and insomnia presents to the ED for altered mental status with increased weakness and decreased po intake. Patient unable to answer questions secondary to mental status. History obtained from patient's wife and son via telephone. Patient was recently admitted to [facility] for a manic episode ~3 weeks ago. There his dose of Bupropion was decreased 150mg TID to once daily, he also had a normal CT head and was diagnosed with CKD3.  Soon after discharge, patient began to have increased weakness, unsteady gait with multiple mechanical falls, slurred speech, and decreased po intake. These symptoms seemed to have a rapid onset ~10 days and have not improved. His son also notes, he seemed to have pain with swallowing as well. Of note, patient's mom also had hx of Alzheimer's dementia and parkinson's disease which she was diagnosed with in her 70s and family is concerned he might be developing a neurological disorder. Patient presented with elevated Lithium level, it was held and currently is on Precedex, he is unable to engage.

## 2023-04-11 NOTE — PROGRESS NOTE ADULT - PROBLEM SELECTOR PLAN 1
AMS in the setting of progressive dementia vs. medications vs. electrolyte imbalance  - Transferred to ICU to be started on Precedex drip b/c patient became acutely delirious   - Abnormal EEG of unclear significance -- started on empiric Keppra and IV acyclovir (?herpes encephalitis)  - ID consult  - F/u UCx, BCx x2 -- NTD  - Neuro f/u  - Psych (Dr. Floyd) consulted, f/u recs

## 2023-04-11 NOTE — PROGRESS NOTE ADULT - SUBJECTIVE AND OBJECTIVE BOX
Interval Events:   Patient continues to seem agitated. Awake but not alert. Appears to have rhythmic motions up and down on bed, flexes knees and bends them side to side repetitively. Responds to name today. Now off of Precedex.   Dany yoandy  K repleted     Review of Systems:  unable to obtain     ICU Vital Signs Last 24 Hrs  T(C): 36.7 (11 Apr 2023 12:08), Max: 36.9 (10 Apr 2023 16:30)  T(F): 98.1 (11 Apr 2023 12:08), Max: 98.5 (10 Apr 2023 16:30)  HR: 91 (11 Apr 2023 13:00) (41 - 91)  BP: 144/69 (11 Apr 2023 13:00) (90/50 - 185/84)  BP(mean): 99 (11 Apr 2023 13:00) (64 - 120)  ABP: --  ABP(mean): --  RR: 37 (11 Apr 2023 13:00) (14 - 59)  SpO2: 100% (11 Apr 2023 13:00) (93% - 100%)    O2 Parameters below as of 11 Apr 2023 08:30    O2 Flow (L/min): 0.3            04-10-23 @ 07:01  -  04-11-23 @ 07:00  --------------------------------------------------------  IN: 2540.3 mL / OUT: 800 mL / NET: 1740.3 mL    04-11-23 @ 07:01  -  04-11-23 @ 13:08  --------------------------------------------------------  IN: 340 mL / OUT: 0 mL / NET: 340 mL        CAPILLARY BLOOD GLUCOSE          I&O's Summary    10 Apr 2023 07:01  -  11 Apr 2023 07:00  --------------------------------------------------------  IN: 2540.3 mL / OUT: 800 mL / NET: 1740.3 mL    11 Apr 2023 07:01  -  11 Apr 2023 13:08  --------------------------------------------------------  IN: 340 mL / OUT: 0 mL / NET: 340 mL        PHYSICAL EXAM:  GENERAL: elderly M, ill appearing  EYES: PERRLA, conjunctiva and sclera clear  NECK: Supple, No JVD  NERVOUS SYSTEM:  intermittently agitated, localizes and withdraws to pain in all extremities,cannot follow commands or verbalize, responds in one word to name, appears to have repetitive rhythmic motions kicking legs out   CHEST/LUNG: Clear to auscultation bilaterally; No rales, rhonchi, wheezing,  HEART: Regular rate and rhythm  ABDOMEN: Soft, Nontender, Nondistended; Bowel sounds present  EXTREMITIES:  No clubbing, cyanosis, or edema    Meds:  acyclovir IVPB   acyclovir IVPB IV Intermittent  acyclovir IVPB IV Intermittent  piperacillin/tazobactam IVPB.. IV Intermittent      levothyroxine Injectable IV Push      dexMEDEtomidine Infusion IV Continuous  haloperidol    Injectable IntraMuscular PRN  levETIRAcetam  IVPB IV Intermittent  ondansetron Injectable IV Push PRN      enoxaparin Injectable SubCutaneous        dextrose 5%. IV Continuous                                  11.9   13.39 )-----------( 168      ( 11 Apr 2023 05:54 )             36.0       04-11    146<H>  |  119<H>  |  14  ----------------------------<  143<H>  3.4<L>   |  25  |  1.30    Ca    9.1      11 Apr 2023 05:54  Phos  2.6     04-10  Mg     2.0     04-10    TPro  5.9<L>  /  Alb  3.1<L>  /  TBili  0.6  /  DBili  x   /  AST  59<H>  /  ALT  47  /  AlkPhos  105  04-11      CARDIAC MARKERS ( 10 Apr 2023 12:40 )  x     / x     / 1374 U/L / x     / 9.4 ng/mL  CARDIAC MARKERS ( 10 Apr 2023 02:15 )  x     / x     / 1922 U/L / x     / 13.8 ng/mL          .Blood Blood-Peripheral   No growth to date. -- 04-10 @ 03:48  .Blood Blood-Peripheral   No growth to date. -- 04-10 @ 03:45  Clean Catch Clean Catch (Midstream)   No growth -- 04-08 @ 10:40  .Blood Blood-Peripheral   No growth to date. -- 04-08 @ 07:00              Radiology:    Bedside Ultrasound:    Tubes/Lines:      GLOBAL ISSUE/BEST PRACTICE:  Analgesia:Y  Sedation:Y  HOB elevation: Y  Stress ulcer prophylaxis:  VTE prophylaxis:Y  Glycemic control:N  Nutrition:N    CODE STATUS: full code        Interval Events:   EEG shows no seizures but potential eleptogenic focus in L temporal lobe  remains on precedex  received 3 doses prn haldol in last 24h      Review of Systems:  unable to obtain     ICU Vital Signs Last 24 Hrs  T(C): 36.7 (11 Apr 2023 12:08), Max: 36.9 (10 Apr 2023 16:30)  T(F): 98.1 (11 Apr 2023 12:08), Max: 98.5 (10 Apr 2023 16:30)  HR: 91 (11 Apr 2023 13:00) (41 - 91)  BP: 144/69 (11 Apr 2023 13:00) (90/50 - 185/84)  BP(mean): 99 (11 Apr 2023 13:00) (64 - 120)  ABP: --  ABP(mean): --  RR: 37 (11 Apr 2023 13:00) (14 - 59)  SpO2: 100% (11 Apr 2023 13:00) (93% - 100%)    O2 Parameters below as of 11 Apr 2023 08:30    O2 Flow (L/min): 0.3            04-10-23 @ 07:01  -  04-11-23 @ 07:00  --------------------------------------------------------  IN: 2540.3 mL / OUT: 800 mL / NET: 1740.3 mL    04-11-23 @ 07:01  -  04-11-23 @ 13:08  --------------------------------------------------------  IN: 340 mL / OUT: 0 mL / NET: 340 mL        CAPILLARY BLOOD GLUCOSE          I&O's Summary    10 Apr 2023 07:01  -  11 Apr 2023 07:00  --------------------------------------------------------  IN: 2540.3 mL / OUT: 800 mL / NET: 1740.3 mL    11 Apr 2023 07:01  -  11 Apr 2023 13:08  --------------------------------------------------------  IN: 340 mL / OUT: 0 mL / NET: 340 mL        PHYSICAL EXAM:  GENERAL: elderly M, ill appearing  EYES: PERRLA, conjunctiva and sclera clear  NERVOUS SYSTEM:  awake but minimal awareness  will attend to name, ?follows some simple commands, when asked a questions responds with "what??"  PERRL, +blink to threat  moves all extremities  restless  episodic rhythmic motion of legs (as is pushing up against footboard)  CHEST/LUNG: Clear to auscultation bilaterally; No rales, rhonchi, wheezing,  HEART: Regular rate and rhythm  ABDOMEN: Soft, Nontender, Nondistended; Bowel sounds present  EXTREMITIES:  No clubbing, cyanosis, or edema    Meds:  acyclovir IVPB   acyclovir IVPB IV Intermittent  acyclovir IVPB IV Intermittent  piperacillin/tazobactam IVPB.. IV Intermittent      levothyroxine Injectable IV Push      dexMEDEtomidine Infusion IV Continuous  haloperidol    Injectable IntraMuscular PRN  levETIRAcetam  IVPB IV Intermittent  ondansetron Injectable IV Push PRN      enoxaparin Injectable SubCutaneous        dextrose 5%. IV Continuous                                  11.9   13.39 )-----------( 168      ( 11 Apr 2023 05:54 )             36.0       04-11    146<H>  |  119<H>  |  14  ----------------------------<  143<H>  3.4<L>   |  25  |  1.30    Ca    9.1      11 Apr 2023 05:54  Phos  2.6     04-10  Mg     2.0     04-10    TPro  5.9<L>  /  Alb  3.1<L>  /  TBili  0.6  /  DBili  x   /  AST  59<H>  /  ALT  47  /  AlkPhos  105  04-11      CARDIAC MARKERS ( 10 Apr 2023 12:40 )  x     / x     / 1374 U/L / x     / 9.4 ng/mL  CARDIAC MARKERS ( 10 Apr 2023 02:15 )  x     / x     / 1922 U/L / x     / 13.8 ng/mL          .Blood Blood-Peripheral   No growth to date. -- 04-10 @ 03:48  .Blood Blood-Peripheral   No growth to date. -- 04-10 @ 03:45  Clean Catch Clean Catch (Midstream)   No growth -- 04-08 @ 10:40  .Blood Blood-Peripheral   No growth to date. -- 04-08 @ 07:00              Radiology:    Clinical Impression:    Potential epileptogenic focus in the left temporal region.  Moderate nonspecific diffuse or multifocal cerebral dysfunction.   No seizure recorded.  ________________________________________       Mukesh Dubois MD, PhD  Director, Epilepsy Division, [facility]  ------------------------------------  EEG Reading Room: [phone]  On Call Service After Hours: [phone]    		  Tubes/Lines:  cherry      GLOBAL ISSUE/BEST PRACTICE:  Analgesia:Y  Sedation:Y  HOB elevation: Y  Stress ulcer prophylaxis:  VTE prophylaxis:Y  Glycemic control:N  Nutrition:N    CODE STATUS: full code

## 2023-04-11 NOTE — PROGRESS NOTE ADULT - PROBLEM SELECTOR PLAN 2
Chronic, diagnosed 2005.  - Recent admission to [facility] for manic episode  - Hold lithium 2/2 litihium toxicity  - Psych (Dr. Floyd) consulted, appreciate recs

## 2023-04-11 NOTE — BH CONSULTATION LIAISON ASSESSMENT NOTE - CURRENT MEDICATION
MEDICATIONS  (STANDING):  dexMEDEtomidine Infusion 0.1 MICROgram(s)/kG/Hr (2.04 mL/Hr) IV Continuous <Continuous>  dextrose 5%. 1000 milliLiter(s) (80 mL/Hr) IV Continuous <Continuous>  heparin   Injectable 5000 Unit(s) SubCutaneous every 8 hours  levETIRAcetam  IVPB 500 milliGRAM(s) IV Intermittent every 12 hours  levETIRAcetam  IVPB 1000 milliGRAM(s) IV Intermittent once  levothyroxine Injectable 12.5 MICROGram(s) IV Push at bedtime  piperacillin/tazobactam IVPB.. 3.375 Gram(s) IV Intermittent every 8 hours    MEDICATIONS  (PRN):  haloperidol    Injectable 1 milliGRAM(s) IntraMuscular every 6 hours PRN Agitation  ondansetron Injectable 4 milliGRAM(s) IV Push every 8 hours PRN Nausea and/or Vomiting

## 2023-04-11 NOTE — EEG REPORT - NS EEG TEXT BOX
REPORT OF ROUTINE EEG WITH VIDEO    [facility]: [address], [address] [place] [address], Phone: [phone]  [facility]: [address], [place] [address], Phone: [phone]  Office: [address] [place] [address], [facility] [address], [place] [address], Phone: [phone]    Patient Name: MAXI FANG  Age: 67 year  : 1955  MRN #: [id number], Location: ICU2  Referring Physician: KAEL CARPENTER    EEG #: [id number]  Study Date: 4/10/2023   Start Time: 2:15:25 PM     Study Duration: 34.5    Technical Information:					  On Instrument: [id number]  Placement and Labeling of Electrodes:  The EEG was performed utilizing 20 channels referential EEG connections (coronal over temporal over parasagittal montage) using all standard 10-20 electrode placements with EKG.  Recording was at a sampling rate of 256 samples per second per channel.  Time synchronized digital video recording was done simultaneously with EEG recording.  A low light infrared camera was used for low light recording.  Rashaad and seizure detection algorithms were utilized.  CSA Technical Component:  Quantitative EEG analysis using a separate Compressed Spectral Array (CSA) software package was conducted in real-time and run at bedside after set up by the technician, digitally displaying the power of electrographic frequencies included in the 1-30Hz band using a graded color map.  This data was reviewed and interpreted independently, and is reported in a separate section below.    History:  -      Medication  Zosyn  Zofran  Synthroid  Precedex  Haldol  Heparin    Study Interpretation:    FINDINGS:  The background was continuous, but not spontaneously variable.  Wakefulness did not occur. A posterior dominant rhythm is not present.    Background Slowing:  Generalized slowing: the background consists of delta activity diffusely with intermixed theta.   Focal slowing: none was present.    Sleep Background:  -Stage II sleep transients were not recorded.    Non-epileptiform activity:  None.    Epileptiform Activity:   Sharp waves, focal, left temporal     Events:  No clinical events were recorded.  No seizures were recorded.    Activation Procedures:   -Hyperventilation was not performed.    -Photic stimulation was performed and did not elicit any abnormalities.      Artifacts:  Intermittent myogenic and movement artifacts were noted.    ECG:  The heart rate on single channel ECG was predominantly between 50-60 BPM.    EEG Classification / Summary:    Abnormal EEG in the awake, drowsy and asleep states.  1.	Sharp waves, focal, left temporal   2.	Background slowing, generalized, moderate    Clinical Impression:    Potential epileptogenic focus in the left temporal region.  Moderate nonspecific diffuse or multifocal cerebral dysfunction.   No seizure recorded.  ________________________________________       Mukesh Dubois MD, PhD  Director, Epilepsy Division, [facility]  ------------------------------------  EEG Reading Room: [phone]  On Call Service After Hours: [phone]

## 2023-04-12 LAB
ALBUMIN SERPL ELPH-MCNC: 3.1 G/DL — LOW (ref 3.3–5)
ALP SERPL-CCNC: 98 U/L — SIGNIFICANT CHANGE UP (ref 40–120)
ALT FLD-CCNC: 46 U/L — SIGNIFICANT CHANGE UP (ref 12–78)
ANION GAP SERPL CALC-SCNC: 2 MMOL/L — LOW (ref 5–17)
APPEARANCE CSF: CLEAR — SIGNIFICANT CHANGE UP
APTT BLD: 26.8 SEC — LOW (ref 27.5–35.5)
AST SERPL-CCNC: 50 U/L — HIGH (ref 15–37)
BASOPHILS # BLD AUTO: 0.06 K/UL — SIGNIFICANT CHANGE UP (ref 0–0.2)
BASOPHILS NFR BLD AUTO: 0.5 % — SIGNIFICANT CHANGE UP (ref 0–2)
BILIRUB SERPL-MCNC: 0.4 MG/DL — SIGNIFICANT CHANGE UP (ref 0.2–1.2)
BUN SERPL-MCNC: 9 MG/DL — SIGNIFICANT CHANGE UP (ref 7–23)
CALCIUM SERPL-MCNC: 8.9 MG/DL — SIGNIFICANT CHANGE UP (ref 8.5–10.1)
CHLORIDE SERPL-SCNC: 117 MMOL/L — HIGH (ref 96–108)
CO2 SERPL-SCNC: 24 MMOL/L — SIGNIFICANT CHANGE UP (ref 22–31)
COLOR CSF: SIGNIFICANT CHANGE UP
CREAT SERPL-MCNC: 1.2 MG/DL — SIGNIFICANT CHANGE UP (ref 0.5–1.3)
EGFR: 65 ML/MIN/1.73M2 — SIGNIFICANT CHANGE UP
EOSINOPHIL # BLD AUTO: 0.48 K/UL — SIGNIFICANT CHANGE UP (ref 0–0.5)
EOSINOPHIL NFR BLD AUTO: 4.1 % — SIGNIFICANT CHANGE UP (ref 0–6)
GLUCOSE CSF-MCNC: 79 MG/DL — HIGH (ref 40–70)
GLUCOSE SERPL-MCNC: 127 MG/DL — HIGH (ref 70–99)
HCT VFR BLD CALC: 35.4 % — LOW (ref 39–50)
HGB BLD-MCNC: 12 G/DL — LOW (ref 13–17)
IMM GRANULOCYTES NFR BLD AUTO: 0.3 % — SIGNIFICANT CHANGE UP (ref 0–0.9)
INR BLD: 1.17 RATIO — HIGH (ref 0.88–1.16)
LYMPHOCYTES # BLD AUTO: 18.4 % — SIGNIFICANT CHANGE UP (ref 13–44)
LYMPHOCYTES # BLD AUTO: 2.17 K/UL — SIGNIFICANT CHANGE UP (ref 1–3.3)
MAGNESIUM SERPL-MCNC: 1.8 MG/DL — SIGNIFICANT CHANGE UP (ref 1.6–2.6)
MCHC RBC-ENTMCNC: 30.3 PG — SIGNIFICANT CHANGE UP (ref 27–34)
MCHC RBC-ENTMCNC: 33.9 GM/DL — SIGNIFICANT CHANGE UP (ref 32–36)
MCV RBC AUTO: 89.4 FL — SIGNIFICANT CHANGE UP (ref 80–100)
MONOCYTES # BLD AUTO: 0.99 K/UL — HIGH (ref 0–0.9)
MONOCYTES NFR BLD AUTO: 8.4 % — SIGNIFICANT CHANGE UP (ref 2–14)
NEUTROPHILS # BLD AUTO: 8.08 K/UL — HIGH (ref 1.8–7.4)
NEUTROPHILS # CSF: 1 % — SIGNIFICANT CHANGE UP (ref 0–6)
NEUTROPHILS NFR BLD AUTO: 68.3 % — SIGNIFICANT CHANGE UP (ref 43–77)
NRBC # BLD: 0 /100 WBCS — SIGNIFICANT CHANGE UP (ref 0–0)
NRBC NFR CSF: 1 /UL — SIGNIFICANT CHANGE UP (ref 0–5)
PHOSPHATE SERPL-MCNC: 2.8 MG/DL — SIGNIFICANT CHANGE UP (ref 2.5–4.5)
PLATELET # BLD AUTO: 188 K/UL — SIGNIFICANT CHANGE UP (ref 150–400)
POTASSIUM SERPL-MCNC: 3.3 MMOL/L — LOW (ref 3.5–5.3)
POTASSIUM SERPL-SCNC: 3.3 MMOL/L — LOW (ref 3.5–5.3)
PROT CSF-MCNC: 59 MG/DL — HIGH (ref 15–45)
PROT SERPL-MCNC: 5.9 G/DL — LOW (ref 6–8.3)
PROTHROM AB SERPL-ACNC: 13.7 SEC — HIGH (ref 10.5–13.4)
RBC # BLD: 3.96 M/UL — LOW (ref 4.2–5.8)
RBC # CSF: 0 /UL — SIGNIFICANT CHANGE UP (ref 0–0)
RBC # FLD: 12.3 % — SIGNIFICANT CHANGE UP (ref 10.3–14.5)
SODIUM SERPL-SCNC: 143 MMOL/L — SIGNIFICANT CHANGE UP (ref 135–145)
TUBE TYPE: SIGNIFICANT CHANGE UP
WBC # BLD: 11.82 K/UL — HIGH (ref 3.8–10.5)
WBC # FLD AUTO: 11.82 K/UL — HIGH (ref 3.8–10.5)

## 2023-04-12 PROCEDURE — 93010 ELECTROCARDIOGRAM REPORT: CPT

## 2023-04-12 PROCEDURE — 99291 CRITICAL CARE FIRST HOUR: CPT | Mod: GC

## 2023-04-12 PROCEDURE — 99232 SBSQ HOSP IP/OBS MODERATE 35: CPT

## 2023-04-12 RX ORDER — HALOPERIDOL DECANOATE 100 MG/ML
2.5 INJECTION INTRAMUSCULAR EVERY 6 HOURS
Refills: 0 | Status: DISCONTINUED | OUTPATIENT
Start: 2023-04-12 | End: 2023-04-20

## 2023-04-12 RX ORDER — ENOXAPARIN SODIUM 100 MG/ML
40 INJECTION SUBCUTANEOUS EVERY 24 HOURS
Refills: 0 | Status: DISCONTINUED | OUTPATIENT
Start: 2023-04-13 | End: 2023-04-24

## 2023-04-12 RX ORDER — OLANZAPINE 15 MG/1
2.5 TABLET, FILM COATED ORAL ONCE
Refills: 0 | Status: COMPLETED | OUTPATIENT
Start: 2023-04-12 | End: 2023-04-12

## 2023-04-12 RX ORDER — POTASSIUM CHLORIDE 20 MEQ
10 PACKET (EA) ORAL
Refills: 0 | Status: COMPLETED | OUTPATIENT
Start: 2023-04-12 | End: 2023-04-12

## 2023-04-12 RX ORDER — LIDOCAINE HCL 20 MG/ML
10 VIAL (ML) INJECTION ONCE
Refills: 0 | Status: COMPLETED | OUTPATIENT
Start: 2023-04-12 | End: 2023-04-12

## 2023-04-12 RX ADMIN — Medication 266 MILLIGRAM(S): at 21:01

## 2023-04-12 RX ADMIN — OLANZAPINE 2.5 MILLIGRAM(S): 15 TABLET, FILM COATED ORAL at 01:24

## 2023-04-12 RX ADMIN — Medication 100 MILLIEQUIVALENT(S): at 09:52

## 2023-04-12 RX ADMIN — Medication 1 MILLIGRAM(S): at 18:21

## 2023-04-12 RX ADMIN — Medication 266 MILLIGRAM(S): at 05:21

## 2023-04-12 RX ADMIN — HALOPERIDOL DECANOATE 1 MILLIGRAM(S): 100 INJECTION INTRAMUSCULAR at 08:18

## 2023-04-12 RX ADMIN — DEXMEDETOMIDINE HYDROCHLORIDE IN 0.9% SODIUM CHLORIDE 2.04 MICROGRAM(S)/KG/HR: 4 INJECTION INTRAVENOUS at 00:05

## 2023-04-12 RX ADMIN — Medication 100 MILLIEQUIVALENT(S): at 06:51

## 2023-04-12 RX ADMIN — DEXMEDETOMIDINE HYDROCHLORIDE IN 0.9% SODIUM CHLORIDE 2.04 MICROGRAM(S)/KG/HR: 4 INJECTION INTRAVENOUS at 16:47

## 2023-04-12 RX ADMIN — Medication 266 MILLIGRAM(S): at 13:18

## 2023-04-12 RX ADMIN — Medication 1 MILLIGRAM(S): at 17:07

## 2023-04-12 RX ADMIN — SODIUM CHLORIDE 80 MILLILITER(S): 9 INJECTION, SOLUTION INTRAVENOUS at 13:37

## 2023-04-12 RX ADMIN — Medication 100 MILLIEQUIVALENT(S): at 11:00

## 2023-04-12 RX ADMIN — DEXMEDETOMIDINE HYDROCHLORIDE IN 0.9% SODIUM CHLORIDE 2.04 MICROGRAM(S)/KG/HR: 4 INJECTION INTRAVENOUS at 21:48

## 2023-04-12 RX ADMIN — LEVETIRACETAM 400 MILLIGRAM(S): 250 TABLET, FILM COATED ORAL at 05:21

## 2023-04-12 RX ADMIN — HALOPERIDOL DECANOATE 2.5 MILLIGRAM(S): 100 INJECTION INTRAMUSCULAR at 23:26

## 2023-04-12 RX ADMIN — DEXMEDETOMIDINE HYDROCHLORIDE IN 0.9% SODIUM CHLORIDE 2.04 MICROGRAM(S)/KG/HR: 4 INJECTION INTRAVENOUS at 06:36

## 2023-04-12 RX ADMIN — HALOPERIDOL DECANOATE 2.5 MILLIGRAM(S): 100 INJECTION INTRAMUSCULAR at 16:17

## 2023-04-12 RX ADMIN — Medication 18.75 MICROGRAM(S): at 21:05

## 2023-04-12 RX ADMIN — LEVETIRACETAM 400 MILLIGRAM(S): 250 TABLET, FILM COATED ORAL at 18:03

## 2023-04-12 RX ADMIN — Medication 10 MILLILITER(S): at 17:20

## 2023-04-12 NOTE — PROGRESS NOTE ADULT - PROBLEM SELECTOR PLAN 2
Chronic, diagnosed 2005.  - Recent admission to [facility] for manic episode  - Hold lithium 2/2 litihium toxicity  - Psych (Dr. Floyd) consulted, appreciate recs

## 2023-04-12 NOTE — PROGRESS NOTE ADULT - ASSESSMENT
66 y/o male PMH BPD (diagnosed 2005), CKD3, hypothyroidism Urinary retention and insomnia    Neuro: AMS/ A O x 0 since admission,   4/9 code grey for severe agitation Placed on precedex gtt. Original CT head negative for acute process. Now off of precedex  Suspect lithium toxicity vs autoimmune vs paraneoplastic encephalopathy vs seizures  Lithium levels now wnl   Recommend MRI and LP to r/o autoimmune encephalitis although family not agreeable   Will attempt to obtain discharge summary from wife   EEG report from yesterday showed potential epileptogenic focus in left temporal region, moderate nonspecific diffuse multifocal cerebral dysfunction, no seizures recorded  Per neuro recs, started on keppra  Repeat EEG ordered tomorrow AM   Neuro consult appreciated     Cardio: Dany overnight, continue to monitor vitals closely     Pulm: Maintaining O2 sat > 92 % on room air. No other active issues.     GI: strict NPO    Renal: CKD with Scr around baseline, will trend with lytes and replete as needed. Hypernatremic to 149 almost, on D 5 80 cc/hr. Will trend BMP. I/O's. Avoid nephrotoxic agents    Heme: Hep Sub Q for DVT prophylaxis    ID: Wbc improving, low suspicion for infection at this time UA negative, CXR clear, Bcx NGTD. Can DC zosyn.   higher suspicion for autoimmune vs paraneoplastic encephalitis   will start trial of acyclovir given abnormal EEG findings in temporal lobe     Endo; TSH wnl. No other active issues at this time.    66 y/o male PMH BPD (diagnosed 2005), CKD3, hypothyroidism Urinary retention and insomnia    Neuro: AMS/ A O x 0 since admission, Currently sedated with Precedex for agitation    4/9 code grey for severe agitation Placed on precedex gtt. Original CT head negative for acute process.   Suspect lithium toxicity vs autoimmune vs paraneoplastic encephalopathy vs seizures  - F/u Lithium levels in the AM  - Recommend MRI and LP to r/o autoimmune encephalitis although family not agreeable   - EEG report from yesterday showed potential epileptogenic focus in left temporal region, moderate nonspecific diffuse multifocal cerebral dysfunction, no seizures recorded  - Per neuro following, appreciate recs  - Continue with Keppra  - Repeat EEG Today - f/u result    Cardio: VSS, continue to monitor vitals closely     Pulm: Maintaining O2 sat > 92 % on room air. No other active issues.     GI: strict NPO, Its been close to 5 days with out oral intake, Will place NG tube for tube feeding.     Renal: CKD with Scr around baseline, will trend with lytes and replete as needed. Hypernatremia resolved, Potassium 3.3 today will replace.   Heme: Hep Sub Q for DVT prophylaxis    ID: Wbc improving, low suspicion for infection at this time UA negative, CXR clear, Bcx NGTD. Monitor off Abx  higher suspicion for autoimmune vs paraneoplastic encephalitis   will start trial of acyclovir given abnormal EEG findings in temporal lobe     Endo; TSH wnl. No other active issues at this time.

## 2023-04-12 NOTE — PROGRESS NOTE ADULT - PROBLEM SELECTOR PLAN 1
AMS in the setting of progressive dementia vs. medications vs. electrolyte imbalance  - Transferred to ICU to be started on Precedex drip b/c patient became acutely delirious   - Abnormal EEG of unclear significance -- started on empiric Keppra and IV acyclovir (?herpes encephalitis)  - ID consult with dr. woody  - F/u UCx, BCx x2 -- NTD  - Neuro f/u  - Psych (Dr. Floyd) consulted, f/u recs

## 2023-04-12 NOTE — PROGRESS NOTE ADULT - SUBJECTIVE AND OBJECTIVE BOX
[facility]  INFECTIOUS DISEASES - [address], [place] [address]  Tel: [phone]     Fax: [phone]  =======================================================    MAXI FANG [id number]    Follow up: No fevers. Seen earlier today. Awake but confused.    Allergies:  No Known Allergies      Antibiotics:  acyclovir IVPB      acyclovir IVPB 800 milliGRAM(s) IV Intermittent every 8 hours  dexMEDEtomidine Infusion 0.1 MICROgram(s)/kG/Hr IV Continuous <Continuous>  dextrose 5%. 1000 milliLiter(s) IV Continuous <Continuous>  haloperidol    Injectable 2.5 milliGRAM(s) IntraMuscular every 6 hours PRN  levETIRAcetam  IVPB 500 milliGRAM(s) IV Intermittent every 12 hours  levothyroxine Injectable 18.75 MICROGram(s) IV Push at bedtime       REVIEW OF SYSTEMS:  unable to obtain 2/2 mental status     Physical Exam:  ICU Vital Signs Last 24 Hrs  T(C): 36.9 (12 Apr 2023 20:26), Max: 36.9 (12 Apr 2023 20:26)  T(F): 98.4 (12 Apr 2023 20:26), Max: 98.4 (12 Apr 2023 20:26)  HR: 60 (12 Apr 2023 23:00) (46 - 82)  BP: 113/61 (12 Apr 2023 23:00) (95/52 - 162/74)  BP(mean): 81 (12 Apr 2023 23:00) (68 - 110)  ABP: --  ABP(mean): --  RR: 37 (12 Apr 2023 23:00) (16 - 41)  SpO2: 95% (12 Apr 2023 23:00) (93% - 100%)       GEN: NAD  HEENT: normocephalic and atraumatic.  NECK: Supple.   LUNGS: Clear to auscultation.  HEART: Regular rate and rhythm   ABDOMEN: Soft, nontender, and nondistended.    EXTREMITIES: No leg edema.  NEUROLOGIC: unable to assess, not answering questions appropriately, not following commands    Labs:  04-12    143  |  117<H>  |  9   ----------------------------<  127<H>  3.3<L>   |  24  |  1.20    Ca    8.9      12 Apr 2023 04:55  Phos  2.8     04-12  Mg     1.8     04-12    TPro  5.9<L>  /  Alb  3.1<L>  /  TBili  0.4  /  DBili  x   /  AST  50<H>  /  ALT  46  /  AlkPhos  98  04-12                          12.0   11.82 )-----------( 188      ( 12 Apr 2023 04:55 )             35.4     PT/INR - ( 12 Apr 2023 13:23 )   PT: 13.7 sec;   INR: 1.17 ratio         PTT - ( 12 Apr 2023 13:23 )  PTT:26.8 sec    LIVER FUNCTIONS - ( 12 Apr 2023 04:55 )  Alb: 3.1 g/dL / Pro: 5.9 g/dL / ALK PHOS: 98 U/L / ALT: 46 U/L / AST: 50 U/L / GGT: x             RECENT CULTURES:  04-10 @ 03:48 .Blood Blood-Peripheral     No growth to date.        04-10 @ 03:45 .Blood Blood-Peripheral     No growth to date.        04-08 @ 10:40 Clean Catch Clean Catch (Midstream)     No growth        04-08 @ 07:00 .Blood Blood-Peripheral     No growth to date.              All imaging and data are reviewed.

## 2023-04-12 NOTE — EEG REPORT - NS EEG TEXT BOX
MAXI FANG [id number] 67y (1955)M  Admitting MD: Dr. Melquiades Dumont    Study Date: 04-12-23    --------------------------------------------------------------------------------------------------  History:  CC/ HPI Patient is a 67y old  Male who presents with a chief complaint of AMS (12 Apr 2023 07:31)    acyclovir IVPB      acyclovir IVPB 800 milliGRAM(s) IV Intermittent every 8 hours  dexMEDEtomidine Infusion 0.1 MICROgram(s)/kG/Hr IV Continuous <Continuous>  dextrose 5%. 1000 milliLiter(s) IV Continuous <Continuous>  levETIRAcetam  IVPB 500 milliGRAM(s) IV Intermittent every 12 hours  levothyroxine Injectable 18.75 MICROGram(s) IV Push at bedtime    --------------------------------------------------------------------------------------------------  Study Interpretation:    [[[Abbreviation Key:  PDR=alpha rhythm/posterior dominant rhythm. A-P=anterior posterior gradient.  Amplitude: ‘very low’:<20; ‘low’:20-50; ‘medium’:; ‘high’:>200uV.  Persistence for periodic/rhythmic patterns (% of epoch) ‘rare’:<1%; ‘occasional’:1-10%; ‘frequent’:10-50%; ‘abundant’:50-90%; ‘continuous’:>90%.  Persistence for sporadic discharges: ‘rare’:<1/hr; ‘occasional’:1/min-1/hr; ‘frequent’:>1/min; ‘abundant’:>1/10 sec.  GRDA=generalized rhythmic delta activity; FIRDA=frontal intermittent GRDA; LRDA=lateralized rhythmic delta activity; TIRDA=temporal intermittent rhythmic delta activity;  LPD=PLED=lateralized periodic discharges; GPD=generalized periodic discharges; BiPDs=BiPLEDs=bilateral independent periodic epileptiform discharges; SIRPID=stimulus induced rhythmic, periodic, or ictal appearing discharges; BIRDs=brief potentially ictal rhythmic discharges >4 Hz, lasting .5-10s; PFA (paroxysmal bursts >13 Hz or =8 Hz).  Modifiers: +F=with fast component; +S=with spike component; +R=with rhythmic component.  S-B=burst suppression pattern.  Max=maximal. N1-drowsy; N2-stage II sleep; N3-slow wave sleep. SSS/BETS=small sharp spikes/benign epileptiform transients of sleep. HV=hyperventilation; PS=photic stimulation]]]    FINDINGS:  The background was continuous, reactive, and comprised of mainly theta and delta frequencies.  No PDR was seen.    Background Slowing:  Generalized slowing: generalized, intermittent rhythmic delta activity was present.  Focal slowing: none was present.    Sleep Background:  -N2 sleep transients were not recorded.    Epileptiform Activity:   No interictal epileptiform discharges were present.    Events:  No clinical events were recorded.  No seizures were recorded.    Activation Procedures:   -Hyperventilation was not performed.    -Photic stimulation was performed and did not elicit any abnormalities.      Artifacts:  Intermittent myogenic and external motion artifacts were noted.    ECG:  The heart rate on single channel ECG at baseline was predominantly near BPM = 70  -----------------------------------------------------------------------------------------------------    EEG Classification / Summary:  Abnormal EEG study, sedated state  -Generalized, intermittent rhythmic delta activity  -Moderate, diffuse background slowing  -----------------------------------------------------------------------------------------------------    Clinical Impression:  Moderate, diffuse, and nonspecific cortical dysfunction.  There were no epileptiform abnormalities recorded.      -------------------------------------------------------------------------------------------------------  [facility] EEG Reading Room Ph#: [phone]  Epilepsy Answering Service after 5PM and before 8:30AM: Ph#: [phone]    Vale Foster MD  PGY-6 Pediatric Epilepsy    ***THIS IS A PRELIMINARY FELLOW REPORT PENDING REVIEW WITH ATTENDING EPILEPTOLOGIST***  	   MAXI FANG [id number] 67y (1955)M  Admitting MD: Dr. Melquiades Dumont    Study Date: 04-12-23  Duration: 39 min  --------------------------------------------------------------------------------------------------  History:  CC/ HPI Patient is a 67y old  Male who presents with a chief complaint of AMS (12 Apr 2023 07:31)    acyclovir IVPB      acyclovir IVPB 800 milliGRAM(s) IV Intermittent every 8 hours  dexMEDEtomidine Infusion 0.1 MICROgram(s)/kG/Hr IV Continuous <Continuous>  dextrose 5%. 1000 milliLiter(s) IV Continuous <Continuous>  levETIRAcetam  IVPB 500 milliGRAM(s) IV Intermittent every 12 hours  levothyroxine Injectable 18.75 MICROGram(s) IV Push at bedtime    --------------------------------------------------------------------------------------------------  Study Interpretation:    [[[Abbreviation Key:  PDR=alpha rhythm/posterior dominant rhythm. A-P=anterior posterior gradient.  Amplitude: ‘very low’:<20; ‘low’:20-50; ‘medium’:; ‘high’:>200uV.  Persistence for periodic/rhythmic patterns (% of epoch) ‘rare’:<1%; ‘occasional’:1-10%; ‘frequent’:10-50%; ‘abundant’:50-90%; ‘continuous’:>90%.  Persistence for sporadic discharges: ‘rare’:<1/hr; ‘occasional’:1/min-1/hr; ‘frequent’:>1/min; ‘abundant’:>1/10 sec.  GRDA=generalized rhythmic delta activity; FIRDA=frontal intermittent GRDA; LRDA=lateralized rhythmic delta activity; TIRDA=temporal intermittent rhythmic delta activity;  LPD=PLED=lateralized periodic discharges; GPD=generalized periodic discharges; BiPDs=BiPLEDs=bilateral independent periodic epileptiform discharges; SIRPID=stimulus induced rhythmic, periodic, or ictal appearing discharges; BIRDs=brief potentially ictal rhythmic discharges >4 Hz, lasting .5-10s; PFA (paroxysmal bursts >13 Hz or =8 Hz).  Modifiers: +F=with fast component; +S=with spike component; +R=with rhythmic component.  S-B=burst suppression pattern.  Max=maximal. N1-drowsy; N2-stage II sleep; N3-slow wave sleep. SSS/BETS=small sharp spikes/benign epileptiform transients of sleep. HV=hyperventilation; PS=photic stimulation]]]    FINDINGS:   The background was continuous, symmetric, and reactive to external stimulation. In the most wakeful state, the background was comprised of mainly diffuse polymorphic delta > theta frequencies. There was no posterior dominant rhythm. There was frequent generalized rhythmic delta activity (GRDA) at 1-1.5 Hz.    Background Slowing:  Generalized slowing: As above  Focal slowing: None    Sleep Background:  A less wakeful state was characterized by diffuse polymorphic <1-Hz delta slowing with occasional spindle-like waveforms and theta frequencies.  No normal sleep architecture was captured.    Epileptiform Activity:   No interictal epileptiform discharges were present.    Events:  No clinical events were recorded.  No seizures were recorded.    Activation Procedures:   -Hyperventilation was not performed.    -Photic stimulation was performed and did not elicit any abnormalities.      Artifacts:  Occasional myogenic and external motion artifacts were present.    EKG:  Single-lead EKG showed regular rhythm at 40-70 bpm.    -----------------------------------------------------------------------------------------------------    EEG Classification / Summary:  Abnormal EEG study, sedated state  -Moderate-severe diffuse background slowing and GRDA  -No focal or epileptiform abnormalities were captured.    -----------------------------------------------------------------------------------------------------    Clinical Impression:  These findings indicate moderate-severe diffuse cerebral dysfunction of nonspecific etiology.    -------------------------------------------------------------------------------------------------------  [facility] EEG Reading Room Ph#: [phone]  Epilepsy Answering Service after 5PM and before 8:30AM: Ph#: [phone]    Vale Foster MD  PGY-6 Pediatric Epilepsy    Jenna Adams MD  Attending Physician, [facility]

## 2023-04-12 NOTE — PROGRESS NOTE ADULT - SUBJECTIVE AND OBJECTIVE BOX
Neurology Follow up note    MAXI FANG67yMale    HPI:  66 y/o male PMH BPD (diagnosed 2005), CKD3, hypothyroidism Urinary retention and insomnia presents to the ED for altered mental status with increased weakness and decreased po intake. Patient unable to answer questions secondary to mental status. History obtained from patient's wife and son via telephone. Patient was recently admitted to [facility] for a manic episode ~3 weeks ago. There his dose of Bupropion was decreased 150mg TID to once daily, he also had a normal CT head and was diagnosed with CKD3.  Soon after discharge, patient began to have increased weakness, unsteady gait with multiple mechanical falls, slurred speech, and decreased po intake. These symptoms seemed to have a rapid onset ~10 days and have not improved. His son also notes, he seemed to have pain with swallowing as well.   Of note, patient's mom also had hx of Alzheimer's dementia and parkinson's disease which she was diagnosed with in her 70s and family is concerned he might be developing a neurological disorder.   At time of exam, patient unable to answer questions or follow commands.     ED Course  T: 96.6F HR 98 /83 RR 20 SpO2 98% on RA  Labs:  wbc 12.30 PT/INR 13.9/1.19 BUN/Cr 24/1.90  UA: negative  CXR: grossly clear (f/u official read)  EKG: NSR HR 80  CT Head: Grossly, no acute hemorrhage mass or mass effect seen. Better  CT is recommended when patient can tolerate it or sedation be given.    In the ED, pt given NS 1L bolus x1, Ativan 1mg IVPx1   (08 Apr 2023 13:21)      Interval History -more communicative    Patient is seen, chart was reviewed and case was discussed with the treatment team.  Pt is not in any distress.   Lying on bed comfortably.       Vital Signs Last 24 Hrs  T(C): 36.4 (12 Apr 2023 15:54), Max: 37.2 (11 Apr 2023 18:00)  T(F): 97.6 (12 Apr 2023 15:54), Max: 99 (11 Apr 2023 18:00)  HR: 67 (12 Apr 2023 11:30) (44 - 81)  BP: 141/65 (12 Apr 2023 11:30) (85/50 - 156/71)  BP(mean): 91 (12 Apr 2023 11:30) (62 - 103)  RR: 26 (12 Apr 2023 11:30) (16 - 41)  SpO2: 99% (12 Apr 2023 11:30) (93% - 100%)      r            REVIEW OF SYSTEMS:      limited due to poor mentation  not in any distress/pain  On Neurological Examination:    Mental Status - Pt is awake following simple commands      Speech -  Pt has dysarthria.    Cranial Nerves - Pupils 3 mm equal and reactive to light, extraocular eye movements intact. Pt has no visual field deficit.  Pt has no  facial asymmetry. Facial sensation is intact.Tongue - is in midline.    Muscle tone - is normal      Motor Exam - 4/5 all over, No drift. No shaking or tremors.    Sensory Exam - Pt withdraws all extremities equally on stimulation. No asymmetry seen. No complaints of tingling, numbness.    coordination:    Finger to nose: normal    Deep tendon Reflexes - 2 plus all over.          Neck Supple -  Yes.     MEDICATIONS  (STANDING):  acyclovir IVPB      acyclovir IVPB 800 milliGRAM(s) IV Intermittent every 8 hours  dexMEDEtomidine Infusion 0.1 MICROgram(s)/kG/Hr (2.04 mL/Hr) IV Continuous <Continuous>  dextrose 5%. 1000 milliLiter(s) (80 mL/Hr) IV Continuous <Continuous>  levETIRAcetam  IVPB 500 milliGRAM(s) IV Intermittent every 12 hours  levothyroxine Injectable 18.75 MICROGram(s) IV Push at bedtime  lidocaine 1% Injectable 10 milliLiter(s) Local Injection once    MEDICATIONS  (PRN):  haloperidol    Injectable 2.5 milliGRAM(s) IntraMuscular every 6 hours PRN Agitation      Allergies    No Known Allergies    Intolerances    04-12    143  |  117<H>  |  9   ----------------------------<  127<H>  3.3<L>   |  24  |  1.20    Ca    8.9      12 Apr 2023 04:55  Phos  2.8     04-12  Mg     1.8     04-12    TPro  5.9<L>  /  Alb  3.1<L>  /  TBili  0.4  /  DBili  x   /  AST  50<H>  /  ALT  46  /  AlkPhos  98  04-12        Hemoglobin A1C:     Vitamin B12 Vitamin B12, Serum: 692 pg/mL (04-10 @ 08:30)      RADIOLOGY    ASSESSMENT AND PLAN:      seen for ams  sp lithium toxicity  still encephalopathic ?non convulsive sz    EEG- left temporal sharp waves  started on keppra  repeat EEG done  results pending  continue  acyclovir  Family agreeable for LP   management dw critical care team and patient wife at bed side  Pain is accessed and addressed.  Would continue to follow.

## 2023-04-12 NOTE — PROGRESS NOTE ADULT - SUBJECTIVE AND OBJECTIVE BOX
neuro procedure note(LP)  consent obtained from family  patient in right lateral position  lumbar area cleaned with betadine  L2-4 infiltrated with 5 cc lidocaine  with 20 gaze spinal needle about 15 cc of clear colorless fluid obtained  patient tolerated the procedure  keep patient supine for 5 hr  csf sent for cell count /chemistry/c/s; pcr paraneoplastic  and autoimmune profile

## 2023-04-12 NOTE — CHART NOTE - NSCHARTNOTEFT_GEN_A_CORE
Assessment:   Pt seen for nutrition follow up.  Chart reviewed, hospital course noted.     Brief History:   "68 y/o male PMH BPD (diagnosed 2005), CKD3, hypothyroidism Urinary retention and insomnia" admitted for AMS    Pt TT ICU and sedated on Precedex due to episode of severe agitation overnight.   Remains NPO at this time.    Low K+ (on KCl)  NH3 40 H-  follow and Rx Lactulose per MD discretion  +fecal incontinence 4/11     Factors impacting intake: [ ] none [ ] nausea  [ ] vomiting [ ] diarrhea [ ] constipation  [ ]chewing problems [ ] swallowing issues  [x] other: NPO, sedated, AMS    Diet Prescription: Diet, NPO (04-10-23 @ 11:36)    Intake: n/a    Current Weight: 4/10 178.7#, 4/12 184#      Pertinent Medications: MEDICATIONS  (STANDING):  acyclovir IVPB      acyclovir IVPB 800 milliGRAM(s) IV Intermittent every 8 hours  dexMEDEtomidine Infusion 0.1 MICROgram(s)/kG/Hr (2.04 mL/Hr) IV Continuous <Continuous>  dextrose 5%. 1000 milliLiter(s) (80 mL/Hr) IV Continuous <Continuous>  enoxaparin Injectable 40 milliGRAM(s) SubCutaneous every 24 hours  levETIRAcetam  IVPB 500 milliGRAM(s) IV Intermittent every 12 hours  levothyroxine Injectable 18.75 MICROGram(s) IV Push at bedtime    MEDICATIONS  (PRN):  haloperidol    Injectable 2.5 milliGRAM(s) IntraMuscular every 6 hours PRN Agitation    Pertinent Labs: 04-12 Na143 mmol/L Glu 127 mg/dL<H> K+ 3.3 mmol/L<L> Cr  1.20 mg/dL BUN 9 mg/dL 04-12 Phos 2.8 mg/dL 04-12 Alb 3.1 g/dL<L>      Skin: no pressure injuries  Edema: 1+    Estimated Needs:   [x] no change since previous assessment on: 4/9 based on #  kcal/day:  3224-4564  gms protein/day: 67-82    Previous Nutrition Diagnosis:   [x]  Malnutrition  (severe, acute)    Nutrition Diagnosis is [x] ongoing  [ ] resolved [ ] not applicable     New Nutrition Diagnosis: [x] not applicable       Interventions:   Recommend  [ ] Continue current diet  [ ] Change Diet To:  [ ] Nutrition Supplement  [ ] Nutrition Support  [x] Other:  recommend swallow eval prior to feeding when medically feasible    Monitoring and Evaluation:   [x] PO intake [ x ] Tolerance to diet prescription [ x ] weights [ x ] labs [ x ] follow up per protocol  [x] other: s/s GI distress, bowel function, skin integrity/ edema

## 2023-04-12 NOTE — PROGRESS NOTE ADULT - ASSESSMENT
66 y/o male PMH BPD (diagnosed 2005), CKD3, hypothyroidism Urinary retention and insomnia presents to the ED for altered mental status with increased weakness and decreased po intake. Admit for AMS.

## 2023-04-12 NOTE — PROGRESS NOTE ADULT - ASSESSMENT
66 y/o male PMH Bipolar disease (diagnosed 2005), CKD3, hypothyroidism, Urinary retention, insomnia, who presented with altered mental status with increased weakness and decreased po intake. Changes in mental status started about 3.5 weeks ago. Unclear etiology of AMS. Concern for lithium toxicity vs autoimmune vs paraneoplastic encephalopathy vs seizures. Lithium levels now wnl.    LP done today to evaluate for infectious encephalitis (CSF cell count 1, glucose 79, protein 59). He has no further fevers and mild leukocytosis stable. Multiple blood cultures and urine cultures remain no growth.    -continue acyclovir 10mg/kg IV q8h pending CSF studies  -monitor WBC, renal function, LFT's  -follow up CSF glucose, protein, cell count, gram stain and culture, PCR panel, VDRL, Lyme  -follow up syphilis screen  -discussed with ICU team    Jenna Powell MD  Division of Infectious Diseases   Cell [phone] between 8am and 6pm   After 6pm and weekends please call ID service at [phone].

## 2023-04-12 NOTE — PROGRESS NOTE ADULT - SUBJECTIVE AND OBJECTIVE BOX
Patient is a 67y old  Male who presents with a chief complaint of AMS (12 Apr 2023 17:50)      INTERVAL /OVERNIGHT EVENTS: still confused    MEDICATIONS  (STANDING):  acyclovir IVPB      acyclovir IVPB 800 milliGRAM(s) IV Intermittent every 8 hours  dexMEDEtomidine Infusion 0.1 MICROgram(s)/kG/Hr (2.04 mL/Hr) IV Continuous <Continuous>  dextrose 5%. 1000 milliLiter(s) (80 mL/Hr) IV Continuous <Continuous>  levETIRAcetam  IVPB 500 milliGRAM(s) IV Intermittent every 12 hours  levothyroxine Injectable 18.75 MICROGram(s) IV Push at bedtime    MEDICATIONS  (PRN):  haloperidol    Injectable 2.5 milliGRAM(s) IntraMuscular every 6 hours PRN Agitation      Allergies    No Known Allergies    Intolerances        REVIEW OF SYSTEMS:  unable to obtain    Vital Signs Last 24 Hrs  T(C): 36.4 (12 Apr 2023 15:54), Max: 36.6 (12 Apr 2023 08:01)  T(F): 97.6 (12 Apr 2023 15:54), Max: 97.9 (12 Apr 2023 08:01)  HR: 67 (12 Apr 2023 11:30) (44 - 81)  BP: 141/65 (12 Apr 2023 11:30) (85/50 - 156/71)  BP(mean): 91 (12 Apr 2023 11:30) (62 - 103)  RR: 26 (12 Apr 2023 11:30) (16 - 41)  SpO2: 99% (12 Apr 2023 11:30) (93% - 100%)        PHYSICAL EXAM:  GENERAL: NAD, well-groomed, well-developed  HEAD:  Atraumatic, Normocephalic  EYES: EOMI, PERRLA, conjunctiva and sclera clear  ENMT: No tonsillar erythema, exudates, or enlargement; Moist mucous membranes, Good dentition, No lesions  NECK: Supple, No JVD, Normal thyroid  NERVOUS SYSTEM:  Alert & awake; Motor Strength 5/5 B/L upper and lower extremities; DTRs 2+ intact and symmetric  CHEST/LUNG: Clear to auscultation bilaterally; No rales, rhonchi, wheezing, or rubs  HEART: Regular rate and rhythm; No murmurs, rubs, or gallops  ABDOMEN: Soft, Nontender, Nondistended; Bowel sounds present  EXTREMITIES:  2+ Peripheral Pulses, No clubbing, cyanosis, or edema  LYMPH: No lymphadenopathy noted  SKIN: No rashes or lesions    LABS:                        12.0   11.82 )-----------( 188      ( 12 Apr 2023 04:55 )             35.4     12 Apr 2023 04:55    143    |  117    |  9      ----------------------------<  127    3.3     |  24     |  1.20     Ca    8.9        12 Apr 2023 04:55  Phos  2.8       12 Apr 2023 04:55  Mg     1.8       12 Apr 2023 04:55    TPro  5.9    /  Alb  3.1    /  TBili  0.4    /  DBili  x      /  AST  50     /  ALT  46     /  AlkPhos  98     12 Apr 2023 04:55    PT/INR - ( 12 Apr 2023 13:23 )   PT: 13.7 sec;   INR: 1.17 ratio         PTT - ( 12 Apr 2023 13:23 )  PTT:26.8 sec    CAPILLARY BLOOD GLUCOSE      POCT Blood Glucose.: 125 mg/dL (12 Apr 2023 12:11)      RADIOLOGY & ADDITIONAL TESTS:    Notes Reviewed:  [x ] YES  [ ] NO    Care Discussed with Consultants/Other Providers [x ] YES  [ ] NO

## 2023-04-12 NOTE — PROGRESS NOTE ADULT - SUBJECTIVE AND OBJECTIVE BOX
INTERVAL HPI/OVERNIGHT EVENTS:    SUBJECTIVE: Patient seen and examined at bedside.     ROS:  CV: Denies chest pain  Resp: Denies SOB  GI: Denies abdominal pain, constipation, diarrhea, nausea, vomiting  : Denies dysuria  ID: Denies fevers, chills  MSK: Denies joint pain     OBJECTIVE:    VITAL SIGNS:  ICU Vital Signs Last 24 Hrs  T(C): 36.5 (12 Apr 2023 04:36), Max: 37.3 (11 Apr 2023 16:12)  T(F): 97.7 (12 Apr 2023 04:36), Max: 99.1 (11 Apr 2023 16:12)  HR: 80 (12 Apr 2023 06:40) (41 - 101)  BP: 138/67 (12 Apr 2023 06:30) (85/50 - 185/84)  BP(mean): 93 (12 Apr 2023 06:30) (62 - 120)  ABP: --  ABP(mean): --  RR: 35 (12 Apr 2023 06:40) (15 - 52)  SpO2: 98% (12 Apr 2023 06:40) (93% - 100%)    O2 Parameters below as of 11 Apr 2023 08:30    O2 Flow (L/min): 0.3            04-11 @ 07:01  -  04-12 @ 07:00  --------------------------------------------------------  IN: 3706.4 mL / OUT: 500 mL / NET: 3206.4 mL      CAPILLARY BLOOD GLUCOSE          PHYSICAL EXAM:    General: NAD, comfortable  HEENT: NCAT, PERRL, clear conjunctiva, no scleral icterus  Neck: supple, no JVD  Respiratory: CTA b/l, no wheezing, rhonchi, rales  Cardiovascular: RRR, normal S1S2, no M/R/G  Abdomen: soft, NT/ND, bowel sounds in all four quadrants, no palpable masses  Extremities: WWP, no clubbing, cyanosis, or edema  Neuro:     MEDICATIONS:  MEDICATIONS  (STANDING):  acyclovir IVPB      acyclovir IVPB 800 milliGRAM(s) IV Intermittent every 8 hours  dexMEDEtomidine Infusion 0.1 MICROgram(s)/kG/Hr (2.04 mL/Hr) IV Continuous <Continuous>  dextrose 5%. 1000 milliLiter(s) (80 mL/Hr) IV Continuous <Continuous>  enoxaparin Injectable 40 milliGRAM(s) SubCutaneous every 24 hours  levETIRAcetam  IVPB 500 milliGRAM(s) IV Intermittent every 12 hours  levothyroxine Injectable 18.75 MICROGram(s) IV Push at bedtime  potassium chloride  10 mEq/100 mL IVPB 10 milliEquivalent(s) IV Intermittent every 1 hour    MEDICATIONS  (PRN):  haloperidol    Injectable 1 milliGRAM(s) IntraMuscular every 6 hours PRN Agitation  ondansetron Injectable 4 milliGRAM(s) IV Push every 8 hours PRN Nausea and/or Vomiting      ALLERGIES:  Allergies    No Known Allergies    Intolerances        LABS:                        12.0   11.82 )-----------( 188      ( 12 Apr 2023 04:55 )             35.4     04-12    143  |  117<H>  |  9   ----------------------------<  127<H>  3.3<L>   |  24  |  1.20    Ca    8.9      12 Apr 2023 04:55  Phos  2.8     04-12  Mg     1.8     04-12    TPro  5.9<L>  /  Alb  3.1<L>  /  TBili  0.4  /  DBili  x   /  AST  50<H>  /  ALT  46  /  AlkPhos  98  04-12          RADIOLOGY & ADDITIONAL TESTS: Reviewed. INTERVAL HPI/OVERNIGHT EVENTS:    SUBJECTIVE: Patient seen and examined at bedside. Overnight patient received Haldol 1mg X 2 and Zyprexa 2.5 mg  x1 for agitation Patient remains on Peridex 0.6> ID recommended Acyclovir 800 q8 for possible HSV infection.       ROS:  Unable to obtain due to sedation     OBJECTIVE:    VITAL SIGNS:  ICU Vital Signs Last 24 Hrs  T(C): 36.5 (12 Apr 2023 04:36), Max: 37.3 (11 Apr 2023 16:12)  T(F): 97.7 (12 Apr 2023 04:36), Max: 99.1 (11 Apr 2023 16:12)  HR: 80 (12 Apr 2023 06:40) (41 - 101)  BP: 138/67 (12 Apr 2023 06:30) (85/50 - 185/84)  BP(mean): 93 (12 Apr 2023 06:30) (62 - 120)  ABP: --  ABP(mean): --  RR: 35 (12 Apr 2023 06:40) (15 - 52)  SpO2: 98% (12 Apr 2023 06:40) (93% - 100%)    O2 Parameters below as of 11 Apr 2023 08:30    O2 Flow (L/min): 0.3            04-11 @ 07:01  -  04-12 @ 07:00  --------------------------------------------------------  IN: 3706.4 mL / OUT: 500 mL / NET: 3206.4 mL      CAPILLARY BLOOD GLUCOSE          PHYSICAL EXAM:    General: Well developed male who is sedated and laying in bed.  HEENT: NCAT, PERRL, clear conjunctiva, no scleral icterus  Neck: supple, no JVD  Respiratory: Decreased breath sound at the bases b/l, no wheezing, rhonchi, rales  Cardiovascular: RRR, normal S1S2, no M/R/G  Abdomen: soft, NT/ND, bowel sounds in all four quadrants, no palpable masses  Extremities: no clubbing, cyanosis, or edema  Neuro: Patient sedated     MEDICATIONS:  MEDICATIONS  (STANDING):  acyclovir IVPB      acyclovir IVPB 800 milliGRAM(s) IV Intermittent every 8 hours  dexMEDEtomidine Infusion 0.1 MICROgram(s)/kG/Hr (2.04 mL/Hr) IV Continuous <Continuous>  dextrose 5%. 1000 milliLiter(s) (80 mL/Hr) IV Continuous <Continuous>  enoxaparin Injectable 40 milliGRAM(s) SubCutaneous every 24 hours  levETIRAcetam  IVPB 500 milliGRAM(s) IV Intermittent every 12 hours  levothyroxine Injectable 18.75 MICROGram(s) IV Push at bedtime  potassium chloride  10 mEq/100 mL IVPB 10 milliEquivalent(s) IV Intermittent every 1 hour    MEDICATIONS  (PRN):  haloperidol    Injectable 1 milliGRAM(s) IntraMuscular every 6 hours PRN Agitation  ondansetron Injectable 4 milliGRAM(s) IV Push every 8 hours PRN Nausea and/or Vomiting      ALLERGIES:  Allergies    No Known Allergies    Intolerances        LABS:                        12.0   11.82 )-----------( 188      ( 12 Apr 2023 04:55 )             35.4     04-12    143  |  117<H>  |  9   ----------------------------<  127<H>  3.3<L>   |  24  |  1.20    Ca    8.9      12 Apr 2023 04:55  Phos  2.8     04-12  Mg     1.8     04-12    TPro  5.9<L>  /  Alb  3.1<L>  /  TBili  0.4  /  DBili  x   /  AST  50<H>  /  ALT  46  /  AlkPhos  98  04-12          RADIOLOGY & ADDITIONAL TESTS: Reviewed. INTERVAL HPI/OVERNIGHT EVENTS:    SUBJECTIVE: Patient seen and examined at bedside.   acyclovir started yesterday given EKG findings  Overnight patient received Haldol 1mg X 2 and Zyprexa 2.5 mg  x1 for agitation Patient remains on Peridex 0.6>     ROS:  Unable to obtain due to sedation     OBJECTIVE:    VITAL SIGNS:  ICU Vital Signs Last 24 Hrs  T(C): 36.5 (12 Apr 2023 04:36), Max: 37.3 (11 Apr 2023 16:12)  T(F): 97.7 (12 Apr 2023 04:36), Max: 99.1 (11 Apr 2023 16:12)  HR: 80 (12 Apr 2023 06:40) (41 - 101)  BP: 138/67 (12 Apr 2023 06:30) (85/50 - 185/84)  BP(mean): 93 (12 Apr 2023 06:30) (62 - 120)  ABP: --  ABP(mean): --  RR: 35 (12 Apr 2023 06:40) (15 - 52)  SpO2: 98% (12 Apr 2023 06:40) (93% - 100%)    O2 Parameters below as of 11 Apr 2023 08:30    O2 Flow (L/min): 0.3            04-11 @ 07:01  -  04-12 @ 07:00  --------------------------------------------------------  IN: 3706.4 mL / OUT: 500 mL / NET: 3206.4 mL      CAPILLARY BLOOD GLUCOSE          PHYSICAL EXAM:    General: Well developed male who is sedated and laying in bed.  HEENT: PERRL, clear conjunctiva, no scleral icterus  Respiratory: Decreased breath sound at the bases b/l, no wheezing, rhonchi, rales  Cardiovascular: RRR, normal S1S2, no M/R/G  Abdomen: soft, NT/ND, bowel sounds in all four quadrants, no palpable masses  Extremities: no clubbing, cyanosis, or edema  Neuro: waxing and waning mental status, sedated to lethargic at times, restless, moves all extremities, this afternoon with family was able to name wife and sons, given his name and age though speech is very garbled    MEDICATIONS:  MEDICATIONS  (STANDING):  acyclovir IVPB      acyclovir IVPB 800 milliGRAM(s) IV Intermittent every 8 hours  dexMEDEtomidine Infusion 0.1 MICROgram(s)/kG/Hr (2.04 mL/Hr) IV Continuous <Continuous>  dextrose 5%. 1000 milliLiter(s) (80 mL/Hr) IV Continuous <Continuous>  enoxaparin Injectable 40 milliGRAM(s) SubCutaneous every 24 hours  levETIRAcetam  IVPB 500 milliGRAM(s) IV Intermittent every 12 hours  levothyroxine Injectable 18.75 MICROGram(s) IV Push at bedtime  potassium chloride  10 mEq/100 mL IVPB 10 milliEquivalent(s) IV Intermittent every 1 hour    MEDICATIONS  (PRN):  haloperidol    Injectable 1 milliGRAM(s) IntraMuscular every 6 hours PRN Agitation  ondansetron Injectable 4 milliGRAM(s) IV Push every 8 hours PRN Nausea and/or Vomiting      ALLERGIES:  Allergies    No Known Allergies    Intolerances        LABS:                        12.0   11.82 )-----------( 188      ( 12 Apr 2023 04:55 )             35.4     04-12    143  |  117<H>  |  9   ----------------------------<  127<H>  3.3<L>   |  24  |  1.20    Ca    8.9      12 Apr 2023 04:55  Phos  2.8     04-12  Mg     1.8     04-12    TPro  5.9<L>  /  Alb  3.1<L>  /  TBili  0.4  /  DBili  x   /  AST  50<H>  /  ALT  46  /  AlkPhos  98  04-12          RADIOLOGY & ADDITIONAL TESTS: Reviewed.    FINDINGS:   The background was continuous, symmetric, and reactive to external stimulation. In the most wakeful state, the background was comprised of mainly diffuse polymorphic delta > theta frequencies. There was no posterior dominant rhythm. There was frequent generalized rhythmic delta activity (GRDA) at 1-1.5 Hz.    Background Slowing:  Generalized slowing: As above  Focal slowing: None    Sleep Background:  A less wakeful state was characterized by diffuse polymorphic <1-Hz delta slowing with occasional spindle-like waveforms and theta frequencies.  No normal sleep architecture was captured.    Epileptiform Activity:   No interictal epileptiform discharges were present.    Events:  No clinical events were recorded.  No seizures were recorded.    Activation Procedures:   -Hyperventilation was not performed.    -Photic stimulation was performed and did not elicit any abnormalities.      Artifacts:  Occasional myogenic and external motion artifacts were present.    EKG:  Single-lead EKG showed regular rhythm at 40-70 bpm.    -----------------------------------------------------------------------------------------------------    EEG Classification / Summary:  Abnormal EEG study, sedated state  -Moderate-severe diffuse background slowing and GRDA  -No focal or epileptiform abnormalities were captured.    -----------------------------------------------------------------------------------------------------    Clinical Impression:  These findings indicate moderate-severe diffuse cerebral dysfunction of nonspecific etiology.    -------------------------------------------------------------------------------------------------------  [facility] EEG Reading Room Ph#: [phone]  Epilepsy Answering Service after 5PM and before 8:30AM: Ph#: [phone]    Vale Foster MD  PGY-6 Pediatric Epilepsy    Jenna Adams MD  Attending Physician, [facility]

## 2023-04-13 LAB
ANION GAP SERPL CALC-SCNC: 4 MMOL/L — LOW (ref 5–17)
BUN SERPL-MCNC: 5 MG/DL — LOW (ref 7–23)
CALCIUM SERPL-MCNC: 8.4 MG/DL — LOW (ref 8.5–10.1)
CHLORIDE SERPL-SCNC: 116 MMOL/L — HIGH (ref 96–108)
CK SERPL-CCNC: 572 U/L — HIGH (ref 26–308)
CO2 SERPL-SCNC: 24 MMOL/L — SIGNIFICANT CHANGE UP (ref 22–31)
CREAT SERPL-MCNC: 0.92 MG/DL — SIGNIFICANT CHANGE UP (ref 0.5–1.3)
CRYPTOC AG CSF-ACNC: NEGATIVE — SIGNIFICANT CHANGE UP
CSF PCR RESULT: SIGNIFICANT CHANGE UP
CULTURE RESULTS: SIGNIFICANT CHANGE UP
CULTURE RESULTS: SIGNIFICANT CHANGE UP
EGFR: 90 ML/MIN/1.73M2 — SIGNIFICANT CHANGE UP
GLUCOSE SERPL-MCNC: 163 MG/DL — HIGH (ref 70–99)
GRAM STN FLD: SIGNIFICANT CHANGE UP
HCT VFR BLD CALC: 34.1 % — LOW (ref 39–50)
HGB BLD-MCNC: 11.7 G/DL — LOW (ref 13–17)
INR BLD: 1.22 RATIO — HIGH (ref 0.88–1.16)
LABORATORY COMMENT REPORT: SIGNIFICANT CHANGE UP
LITHIUM SERPL-MCNC: 0.4 MMOL/L — LOW (ref 0.6–1.2)
MAGNESIUM SERPL-MCNC: 1.8 MG/DL — SIGNIFICANT CHANGE UP (ref 1.6–2.6)
MCHC RBC-ENTMCNC: 30.7 PG — SIGNIFICANT CHANGE UP (ref 27–34)
MCHC RBC-ENTMCNC: 34.3 GM/DL — SIGNIFICANT CHANGE UP (ref 32–36)
MCV RBC AUTO: 89.5 FL — SIGNIFICANT CHANGE UP (ref 80–100)
NIGHT BLUE STAIN TISS: SIGNIFICANT CHANGE UP
NRBC # BLD: 0 /100 WBCS — SIGNIFICANT CHANGE UP (ref 0–0)
PHOSPHATE SERPL-MCNC: 2.9 MG/DL — SIGNIFICANT CHANGE UP (ref 2.5–4.5)
PLATELET # BLD AUTO: 167 K/UL — SIGNIFICANT CHANGE UP (ref 150–400)
POTASSIUM SERPL-MCNC: 3.2 MMOL/L — LOW (ref 3.5–5.3)
POTASSIUM SERPL-SCNC: 3.2 MMOL/L — LOW (ref 3.5–5.3)
PROTHROM AB SERPL-ACNC: 14.3 SEC — HIGH (ref 10.5–13.4)
RBC # BLD: 3.81 M/UL — LOW (ref 4.2–5.8)
RBC # FLD: 12.6 % — SIGNIFICANT CHANGE UP (ref 10.3–14.5)
SODIUM SERPL-SCNC: 144 MMOL/L — SIGNIFICANT CHANGE UP (ref 135–145)
SOURCE HSV 1/2: SIGNIFICANT CHANGE UP
SPECIMEN SOURCE: SIGNIFICANT CHANGE UP
WBC # BLD: 9.18 K/UL — SIGNIFICANT CHANGE UP (ref 3.8–10.5)
WBC # FLD AUTO: 9.18 K/UL — SIGNIFICANT CHANGE UP (ref 3.8–10.5)

## 2023-04-13 PROCEDURE — 99233 SBSQ HOSP IP/OBS HIGH 50: CPT

## 2023-04-13 PROCEDURE — 99233 SBSQ HOSP IP/OBS HIGH 50: CPT | Mod: GC

## 2023-04-13 RX ORDER — MAGNESIUM SULFATE 500 MG/ML
2 VIAL (ML) INJECTION ONCE
Refills: 0 | Status: COMPLETED | OUTPATIENT
Start: 2023-04-13 | End: 2023-04-13

## 2023-04-13 RX ORDER — POTASSIUM CHLORIDE 20 MEQ
10 PACKET (EA) ORAL
Refills: 0 | Status: COMPLETED | OUTPATIENT
Start: 2023-04-13 | End: 2023-04-13

## 2023-04-13 RX ORDER — LABETALOL HCL 100 MG
10 TABLET ORAL ONCE
Refills: 0 | Status: COMPLETED | OUTPATIENT
Start: 2023-04-13 | End: 2023-04-13

## 2023-04-13 RX ADMIN — Medication 266 MILLIGRAM(S): at 05:29

## 2023-04-13 RX ADMIN — Medication 10 MILLIGRAM(S): at 18:19

## 2023-04-13 RX ADMIN — ENOXAPARIN SODIUM 40 MILLIGRAM(S): 100 INJECTION SUBCUTANEOUS at 00:19

## 2023-04-13 RX ADMIN — LEVETIRACETAM 400 MILLIGRAM(S): 250 TABLET, FILM COATED ORAL at 17:22

## 2023-04-13 RX ADMIN — DEXMEDETOMIDINE HYDROCHLORIDE IN 0.9% SODIUM CHLORIDE 2.04 MICROGRAM(S)/KG/HR: 4 INJECTION INTRAVENOUS at 10:19

## 2023-04-13 RX ADMIN — Medication 100 MILLIEQUIVALENT(S): at 08:29

## 2023-04-13 RX ADMIN — DEXMEDETOMIDINE HYDROCHLORIDE IN 0.9% SODIUM CHLORIDE 2.04 MICROGRAM(S)/KG/HR: 4 INJECTION INTRAVENOUS at 03:29

## 2023-04-13 RX ADMIN — Medication 25 GRAM(S): at 08:29

## 2023-04-13 RX ADMIN — Medication 100 MILLIEQUIVALENT(S): at 09:45

## 2023-04-13 RX ADMIN — Medication 18.75 MICROGRAM(S): at 23:09

## 2023-04-13 RX ADMIN — DEXMEDETOMIDINE HYDROCHLORIDE IN 0.9% SODIUM CHLORIDE 2.04 MICROGRAM(S)/KG/HR: 4 INJECTION INTRAVENOUS at 06:41

## 2023-04-13 RX ADMIN — HALOPERIDOL DECANOATE 2.5 MILLIGRAM(S): 100 INJECTION INTRAMUSCULAR at 10:19

## 2023-04-13 RX ADMIN — HALOPERIDOL DECANOATE 2.5 MILLIGRAM(S): 100 INJECTION INTRAMUSCULAR at 16:38

## 2023-04-13 RX ADMIN — SODIUM CHLORIDE 80 MILLILITER(S): 9 INJECTION, SOLUTION INTRAVENOUS at 00:21

## 2023-04-13 RX ADMIN — LEVETIRACETAM 400 MILLIGRAM(S): 250 TABLET, FILM COATED ORAL at 05:29

## 2023-04-13 RX ADMIN — Medication 100 MILLIEQUIVALENT(S): at 10:19

## 2023-04-13 NOTE — PROGRESS NOTE ADULT - SUBJECTIVE AND OBJECTIVE BOX
Patient is a 67y old  Male who presents with a chief complaint of AMS (13 Apr 2023 12:00)      INTERVAL /OVERNIGHT EVENTS: lethargic    MEDICATIONS  (STANDING):  dexMEDEtomidine Infusion 0.1 MICROgram(s)/kG/Hr (2.04 mL/Hr) IV Continuous <Continuous>  dextrose 5%. 1000 milliLiter(s) (80 mL/Hr) IV Continuous <Continuous>  enoxaparin Injectable 40 milliGRAM(s) SubCutaneous every 24 hours  levETIRAcetam  IVPB 500 milliGRAM(s) IV Intermittent every 12 hours  levothyroxine Injectable 18.75 MICROGram(s) IV Push at bedtime    MEDICATIONS  (PRN):  haloperidol    Injectable 2.5 milliGRAM(s) IntraMuscular every 6 hours PRN Agitation      Allergies    No Known Allergies    Intolerances        REVIEW OF SYSTEMS:  unable to obtain    Vital Signs Last 24 Hrs  T(C): 36.9 (13 Apr 2023 16:15), Max: 36.9 (12 Apr 2023 20:26)  T(F): 98.5 (13 Apr 2023 16:15), Max: 98.5 (13 Apr 2023 16:15)  HR: 94 (13 Apr 2023 16:00) (46 - 97)  BP: 166/79 (13 Apr 2023 16:00) (85/53 - 182/89)  BP(mean): 114 (13 Apr 2023 16:00) (64 - 129)  RR: 19 (13 Apr 2023 16:00) (15 - 38)  SpO2: 98% (13 Apr 2023 16:00) (91% - 100%)    Parameters below as of 13 Apr 2023 07:30  Patient On (Oxygen Delivery Method): room air        PHYSICAL EXAM:  GENERAL: NAD, well-groomed, well-developed  HEAD:  Atraumatic, Normocephalic  EYES: EOMI, PERRLA, conjunctiva and sclera clear  ENMT: No tonsillar erythema, exudates, or enlargement; Moist mucous membranes, Good dentition, No lesions  NECK: Supple, No JVD, Normal thyroid  NERVOUS SYSTEM: ; Motor Strength 5/5 B/L upper and lower extremities; DTRs 2+ intact and symmetric  CHEST/LUNG: Clear to auscultation bilaterally; No rales, rhonchi, wheezing, or rubs  HEART: Regular rate and rhythm; No murmurs, rubs, or gallops  ABDOMEN: Soft, Nontender, Nondistended; Bowel sounds present  EXTREMITIES:  2+ Peripheral Pulses, No clubbing, cyanosis, or edema  LYMPH: No lymphadenopathy noted  SKIN: No rashes or lesions    LABS:                        11.7   9.18  )-----------( 167      ( 13 Apr 2023 06:21 )             34.1     13 Apr 2023 06:21    144    |  116    |  5      ----------------------------<  163    3.2     |  24     |  0.92     Ca    8.4        13 Apr 2023 06:21  Phos  2.9       13 Apr 2023 06:21  Mg     1.8       13 Apr 2023 06:21      PT/INR - ( 13 Apr 2023 06:21 )   PT: 14.3 sec;   INR: 1.22 ratio         PTT - ( 12 Apr 2023 13:23 )  PTT:26.8 sec    CAPILLARY BLOOD GLUCOSE      POCT Blood Glucose.: 116 mg/dL (12 Apr 2023 23:42)  POCT Blood Glucose.: 130 mg/dL (12 Apr 2023 19:03)      RADIOLOGY & ADDITIONAL TESTS:    Notes Reviewed:  [x ] YES  [ ] NO    Care Discussed with Consultants/Other Providers [x ] YES  [ ] NO

## 2023-04-13 NOTE — PROGRESS NOTE ADULT - SUBJECTIVE AND OBJECTIVE BOX
Neurology Follow up note    MAXI [id number]    HPI:  68 y/o male PMH BPD (diagnosed 2005), CKD3, hypothyroidism Urinary retention and insomnia presents to the ED for altered mental status with increased weakness and decreased po intake. Patient unable to answer questions secondary to mental status. History obtained from patient's wife and son via telephone. Patient was recently admitted to [facility] for a manic episode ~3 weeks ago. There his dose of Bupropion was decreased 150mg TID to once daily, he also had a normal CT head and was diagnosed with CKD3.  Soon after discharge, patient began to have increased weakness, unsteady gait with multiple mechanical falls, slurred speech, and decreased po intake. These symptoms seemed to have a rapid onset ~10 days and have not improved. His son also notes, he seemed to have pain with swallowing as well.   Of note, patient's mom also had hx of Alzheimer's dementia and parkinson's disease which she was diagnosed with in her 70s and family is concerned he might be developing a neurological disorder.   At time of exam, patient unable to answer questions or follow commands.     ED Course  T: 96.6F HR 98 /83 RR 20 SpO2 98% on RA  Labs:  wbc 12.30 PT/INR 13.9/1.19 BUN/Cr 24/1.90  UA: negative  CXR: grossly clear (f/u official read)  EKG: NSR HR 80  CT Head: Grossly, no acute hemorrhage mass or mass effect seen. Better  CT is recommended when patient can tolerate it or sedation be given.    In the ED, pt given NS 1L bolus x1, Ativan 1mg IVPx1   (08 Apr 2023 13:21)      Interval History -no new events    Patient is seen, chart was reviewed and case was discussed with the treatment team.  Pt is not in any distress.   Lying on bed comfortably.       Vital Signs Last 24 Hrs  T(C): 36.9 (13 Apr 2023 16:15), Max: 36.9 (12 Apr 2023 20:26)  T(F): 98.5 (13 Apr 2023 16:15), Max: 98.5 (13 Apr 2023 16:15)  HR: 107 (13 Apr 2023 17:00) (46 - 107)  BP: 174/99 (13 Apr 2023 17:00) (85/53 - 182/89)  BP(mean): 126 (13 Apr 2023 17:00) (64 - 129)  RR: 21 (13 Apr 2023 17:00) (15 - 38)  SpO2: 97% (13 Apr 2023 17:00) (91% - 100%)    Parameters below as of 13 Apr 2023 07:30  Patient On (Oxygen Delivery Method): room air          r            REVIEW OF SYSTEMS:      limited due to poor mentation  not in any distress/pain  On Neurological Examination:    Mental Status - Pt is awake following simple commands      Speech -  Pt has dysarthria.    Cranial Nerves - Pupils 3 mm equal and reactive to light, extraocular eye movements intact. Pt has no visual field deficit.  Pt has no  facial asymmetry. Facial sensation is intact.Tongue - is in midline.    Muscle tone - is normal      Motor Exam - 4/5 all over, No drift. No shaking or tremors.    Sensory Exam - Pt withdraws all extremities equally on stimulation. No asymmetry seen. No complaints of tingling, numbness.    coordination:    Finger to nose: normal    Deep tendon Reflexes - 2 plus all over.          Neck Supple -  Yes.     MEDICATIONS  (STANDING):  dexMEDEtomidine Infusion 0.1 MICROgram(s)/kG/Hr (2.04 mL/Hr) IV Continuous <Continuous>  dextrose 5%. 1000 milliLiter(s) (80 mL/Hr) IV Continuous <Continuous>  enoxaparin Injectable 40 milliGRAM(s) SubCutaneous every 24 hours  levETIRAcetam  IVPB 500 milliGRAM(s) IV Intermittent every 12 hours  levothyroxine Injectable 18.75 MICROGram(s) IV Push at bedtime    MEDICATIONS  (PRN):  haloperidol    Injectable 2.5 milliGRAM(s) IntraMuscular every 6 hours PRN Agitation    Allergies    No Known Allergies    Intolerances    04-13    144  |  116<H>  |  5<L>  ----------------------------<  163<H>  3.2<L>   |  24  |  0.92    Ca    8.4<L>      13 Apr 2023 06:21  Phos  2.9     04-13  Mg     1.8     04-13    TPro  5.9<L>  /  Alb  3.1<L>  /  TBili  0.4  /  DBili  x   /  AST  50<H>  /  ALT  46  /  AlkPhos  98  04-12      Hemoglobin A1C:     Vitamin B12 Vitamin B12, Serum: 692 pg/mL (04-10 @ 08:30)      RADIOLOGY    ASSESSMENT AND PLAN:      seen for ams  sp lithium toxicity  still encephalopathic ?non convulsive sz    CSF so far unremarkable for CNS infection  on keppra  would follow up autoimmune profile  management dw critical care team and patient wife at bed side  Pain is accessed and addressed.  Would continue to follow.

## 2023-04-13 NOTE — PROGRESS NOTE ADULT - PROBLEM SELECTOR PLAN 1
AMS in the setting of progressive dementia vs. medications vs. electrolyte imbalance  - Transferred to ICU to be started on Precedex drip b/c patient became acutely delirious   - Abnormal EEG of unclear significance -- started on empiric Keppra and IV acyclovir (?herpes encephalitis)  - ID consult with dr. woody  - F/u UCx, BCx x2 -- NTD  - Neuro follow up  - Psych (Dr. Floyd) consulted, f/u recs  s/p LP yesterday

## 2023-04-13 NOTE — PROGRESS NOTE ADULT - ASSESSMENT
66 y/o male PMH BPD (diagnosed 2005), CKD3, hypothyroidism Urinary retention and insomnia    Neuro: AMS/ A O x 0 since admission, Currently sedated with Precedex for agitation    4/9 code grey for severe agitation Placed on precedex gtt. Original CT head negative for acute process.   Suspected lithium toxicity vs autoimmune vs paraneoplastic encephalopathy vs seizures  - Lithium level 0.4  - LP performed yesterday shows 0 RBC, 1 neutrophil--> now lower suspicion of encephalitis   - HSV negative, can dc acyclovir   - Recommend MRI   - EEG report from yesterday shows no focal epileptiform changes   - Continue with Keppra    Cardio: VSS, continue to monitor vitals closely     Pulm: Maintaining O2 sat > 92 % on room air. No other active issues.     GI: strict NPO, Its been close to 5 days with out oral intake, Will place NG tube for tube feeding.     Renal: CKD with Scr around baseline, will trend with lytes and replete as needed. Hypernatremia resolved, Potassium 3.3 today will replace.   Heme: Hep Sub Q for DVT prophylaxis    ID: Wbc improving, low suspicion for infection at this time UA negative, CXR clear, Bcx NGTD. Monitor off Abx  given LP results now lower suspicion for autoimmune vs paraneoplastic encephalitis   can discontinue acyclovir given low suspicion for HSV encephalitis   f/u syphyliis testing     Endo; TSH wnl. No other active issues at this time.      68 y/o male PMH BPD (diagnosed 2005), CKD3, hypothyroidism Urinary retention and insomnia    Neuro: AMS/ A O x 0 since admission, Currently off of pecedex. On 1:1 observation   unclear reason for AMS/ agitation   - Lithium level 0.4, will follow daily lithium levels to r/o lithium toxicity   - LP performed yesterday shows 0 RBC, 1 neutrophil--> low  suspicion of autoimmune vs paraneoplastic encephalitis   - HSV negative, can dc acyclovir   - Recommend MRI, per patients wife MRI done 3-4 weeks ago was normal; will attempt to obtain records   - EEG report from yesterday shows no focal epileptiform changes   - Continue with Keppra  - 14-3-3 added to LP to r/o CJD     Cardio: VSS, continue to monitor vitals closely     Pulm: Maintaining O2 sat > 92 % on room air. No other active issues.     GI: strict NPO, consider tube feeding     Renal: CKD with Scr around baseline, will trend with lytes and replete as needed.  Heme: Hep Sub Q for DVT prophylaxis    ID: Wbc improving, low suspicion for infection at this time UA negative, CXR clear, Bcx NGTD. Monitor off Abx  given LP results now lower suspicion for autoimmune vs paraneoplastic encephalitis   can discontinue acyclovir given low suspicion for HSV encephalitis   f/u syphilis testing     Endo; TSH wnl. No other active issues at this time.

## 2023-04-13 NOTE — PROGRESS NOTE ADULT - PROBLEM SELECTOR PLAN 2
Chronic, diagnosed 2005.  - Recent admission to [facility] for manic episode  - Hold lithium 2/2 litihium toxicity  - Psych (Dr. Floyd) consulted, appreciate recs

## 2023-04-13 NOTE — PROGRESS NOTE ADULT - SUBJECTIVE AND OBJECTIVE BOX
[facility]  INFECTIOUS DISEASES - [address], [place] [address]  Tel: [phone]     Fax: [phone]  =======================================================    MAXI FANG [id number]    Follow up: No fevers. Seen earlier today.    Allergies:  No Known Allergies      Antibiotics:  dexMEDEtomidine Infusion 0.1 MICROgram(s)/kG/Hr IV Continuous <Continuous>  dextrose 5%. 1000 milliLiter(s) IV Continuous <Continuous>  enoxaparin Injectable 40 milliGRAM(s) SubCutaneous every 24 hours  haloperidol    Injectable 2.5 milliGRAM(s) IntraMuscular every 6 hours PRN  levETIRAcetam  IVPB 500 milliGRAM(s) IV Intermittent every 12 hours  levothyroxine Injectable 18.75 MICROGram(s) IV Push at bedtime       REVIEW OF SYSTEMS:  unable to obtain 2/2 mental status     Physical Exam:  ICU Vital Signs Last 24 Hrs  T(C): 37.2 (13 Apr 2023 20:00), Max: 37.2 (13 Apr 2023 20:00)  T(F): 99 (13 Apr 2023 20:00), Max: 99 (13 Apr 2023 20:00)  HR: 95 (13 Apr 2023 23:00) (46 - 107)  BP: 168/82 (13 Apr 2023 23:00) (85/53 - 193/91)  BP(mean): 118 (13 Apr 2023 23:00) (64 - 134)  ABP: --  ABP(mean): --  RR: 26 (13 Apr 2023 23:00) (15 - 36)  SpO2: 96% (13 Apr 2023 23:00) (91% - 100%)    O2 Parameters below as of 13 Apr 2023 19:00  Patient On (Oxygen Delivery Method): room air        GEN: not in apparent distress  HEENT: normocephalic and atraumatic.  NECK: Supple.   LUNGS: Clear to auscultation.  HEART: Regular rate and rhythm   ABDOMEN: Soft, nontender, and nondistended.    EXTREMITIES: No leg edema.  NEUROLOGIC: unable to assess, not answering questions appropriately, not following commands      Labs:  04-13    144  |  116<H>  |  5<L>  ----------------------------<  163<H>  3.2<L>   |  24  |  0.92    Ca    8.4<L>      13 Apr 2023 06:21  Phos  2.9     04-13  Mg     1.8     04-13    TPro  5.9<L>  /  Alb  3.1<L>  /  TBili  0.4  /  DBili  x   /  AST  50<H>  /  ALT  46  /  AlkPhos  98  04-12                          11.7   9.18  )-----------( 167      ( 13 Apr 2023 06:21 )             34.1     PT/INR - ( 13 Apr 2023 06:21 )   PT: 14.3 sec;   INR: 1.22 ratio         PTT - ( 12 Apr 2023 13:23 )  PTT:26.8 sec    LIVER FUNCTIONS - ( 12 Apr 2023 04:55 )  Alb: 3.1 g/dL / Pro: 5.9 g/dL / ALK PHOS: 98 U/L / ALT: 46 U/L / AST: 50 U/L / GGT: x             RECENT CULTURES:  04-12 @ 17:23 .CSF CSF     No growth    No polymorphonuclear cells seen  No organisms seen  by cytocentrifuge      04-10 @ 03:48 .Blood Blood-Peripheral     No growth to date.        04-10 @ 03:45 .Blood Blood-Peripheral     No growth to date.        04-08 @ 10:40 Clean Catch Clean Catch (Midstream)     No growth        04-08 @ 07:00 .Blood Blood-Peripheral     No Growth Final              All imaging and data are reviewed.

## 2023-04-13 NOTE — PROGRESS NOTE ADULT - ASSESSMENT
68 y/o male PMH Bipolar disease (diagnosed 2005), CKD3, hypothyroidism, Urinary retention, insomnia, who presented with altered mental status with increased weakness and decreased po intake. Changes in mental status started about 3.5 weeks ago. Unclear etiology of AMS. Concern for lithium toxicity vs autoimmune vs paraneoplastic encephalopathy vs seizures. Lithium levels now wnl.    LP done 4/12 to evaluate for infectious encephalitis (CSF cell count 1, glucose 79, protein 59). CSF PCR negative and culture currently no growth. He has no more fever and leukocytosis resolved. Multiple blood cultures and urine cultures remain no growth.    -discontinue acyclovir  -follow up CSF culture, VDRL, Lyme  -discussed with ICU team    Jenna Powell MD  Division of Infectious Diseases   Cell [phone] between 8am and 6pm   After 6pm and weekends please call ID service at [phone].

## 2023-04-13 NOTE — PROGRESS NOTE ADULT - SUBJECTIVE AND OBJECTIVE BOX
Patient is a 67y old  Male who presents with a chief complaint of AMS (10 Apr 2023 14:08)    Patient seen in follow up for MICHELL, elevated lithium level.        PAST MEDICAL HISTORY:  Bipolar disorder    Stage 3 chronic kidney disease    Hard of hearing    Hypothyroidism    Urinary retention    Insomnia      MEDICATIONS  (STANDING):  dexMEDEtomidine Infusion 0.1 MICROgram(s)/kG/Hr (2.04 mL/Hr) IV Continuous <Continuous>  dextrose 5%. 1000 milliLiter(s) (80 mL/Hr) IV Continuous <Continuous>  levETIRAcetam  IVPB 500 milliGRAM(s) IV Intermittent every 12 hours  levothyroxine Injectable 18.75 MICROGram(s) IV Push at bedtime    MEDICATIONS  (PRN):  haloperidol    Injectable 2.5 milliGRAM(s) IntraMuscular every 6 hours PRN Agitation    T(C): 36.2 (04-13-23 @ 08:05), Max: 37.3 (04-11-23 @ 16:12)  HR: 76 (04-13-23 @ 12:00) (44 - 101)  BP: 152/72 (04-13-23 @ 12:00) (85/50 - 162/74)  RR: 26 (04-13-23 @ 12:00)  SpO2: 97% (04-13-23 @ 12:00)  Wt(kg): --  I&O's Detail    12 Apr 2023 07:01  -  13 Apr 2023 07:00  --------------------------------------------------------  IN:    Dexmedetomidine: 193.4 mL    dextrose 5%: 960 mL    IV PiggyBack: 500 mL    IV PiggyBack: 100 mL  Total IN: 1753.4 mL    OUT:    Voided (mL): 2700 mL  Total OUT: 2700 mL    Total NET: -946.6 mL      13 Apr 2023 07:01  -  13 Apr 2023 12:01  --------------------------------------------------------  IN:    Dexmedetomidine: 53.8 mL    dextrose 5%: 320 mL    IV PiggyBack: 50 mL    IV PiggyBack: 300 mL  Total IN: 723.8 mL    OUT:  Total OUT: 0 mL    Total NET: 723.8 mL              PHYSICAL EXAM:  General: No distress  Respiratory: b/l air entry  Cardiovascular: S1 S2  Gastrointestinal: soft  Extremities: no edema                   LABORATORY:                        11.7   9.18  )-----------( 167      ( 13 Apr 2023 06:21 )             34.1     04-13    144  |  116<H>  |  5<L>  ----------------------------<  163<H>  3.2<L>   |  24  |  0.92    Ca    8.4<L>      13 Apr 2023 06:21  Phos  2.9     04-13  Mg     1.8     04-13    TPro  5.9<L>  /  Alb  3.1<L>  /  TBili  0.4  /  DBili  x   /  AST  50<H>  /  ALT  46  /  AlkPhos  98  04-12    Sodium, Serum: 144 mmol/L (04-13 @ 06:21)  Sodium, Serum: 143 mmol/L (04-12 @ 04:55)    Potassium, Serum: 3.2 mmol/L (04-13 @ 06:21)  Potassium, Serum: 3.3 mmol/L (04-12 @ 04:55)    Hemoglobin: 11.7 g/dL (04-13 @ 06:21)  Hemoglobin: 12.0 g/dL (04-12 @ 04:55)  Hemoglobin: 11.9 g/dL (04-11 @ 05:54)    Creatinine, Serum 0.92 (04-13 @ 06:21)  Creatinine, Serum 1.20 (04-12 @ 04:55)  Creatinine, Serum 1.30 (04-11 @ 05:54)    CARDIAC MARKERS ( 13 Apr 2023 06:21 )  x     / x     / 572 U/L / x     / x          LIVER FUNCTIONS - ( 12 Apr 2023 04:55 )  Alb: 3.1 g/dL / Pro: 5.9 g/dL / ALK PHOS: 98 U/L / ALT: 46 U/L / AST: 50 U/L / GGT: x

## 2023-04-13 NOTE — PROGRESS NOTE ADULT - SUBJECTIVE AND OBJECTIVE BOX
Interval Events:   s/p LP yesterday   received Haldol 4 pm and 11 pm yesteray   EEG yesterday shows no epileptiform changes   Patient examined at bedside awake and responds to name and responds to pain. Appears to have less repetitive jerky motions when compared to yesterday     Review of Systems:  unable to obtain     ICU Vital Signs Last 24 Hrs  T(C): 36.2 (13 Apr 2023 08:05), Max: 36.9 (12 Apr 2023 20:26)  T(F): 97.1 (13 Apr 2023 08:05), Max: 98.4 (12 Apr 2023 20:26)  HR: 71 (13 Apr 2023 09:30) (46 - 82)  BP: 131/85 (13 Apr 2023 09:30) (85/53 - 162/74)  BP(mean): 100 (13 Apr 2023 09:30) (64 - 110)  ABP: --  ABP(mean): --  RR: 30 (13 Apr 2023 09:30) (15 - 38)  SpO2: 99% (13 Apr 2023 09:30) (91% - 100%)    O2 Parameters below as of 13 Apr 2023 07:30  Patient On (Oxygen Delivery Method): room air              04-12-23 @ 07:01  -  04-13-23 @ 07:00  --------------------------------------------------------  IN: 1753.4 mL / OUT: 2700 mL / NET: -946.6 mL    04-13-23 @ 07:01  -  04-13-23 @ 09:51  --------------------------------------------------------  IN: 434.4 mL / OUT: 0 mL / NET: 434.4 mL        CAPILLARY BLOOD GLUCOSE      POCT Blood Glucose.: 116 mg/dL (12 Apr 2023 23:42)      I&O's Summary    12 Apr 2023 07:01  -  13 Apr 2023 07:00  --------------------------------------------------------  IN: 1753.4 mL / OUT: 2700 mL / NET: -946.6 mL    13 Apr 2023 07:01  -  13 Apr 2023 09:51  --------------------------------------------------------  IN: 434.4 mL / OUT: 0 mL / NET: 434.4 mL        Physical Exam:   Gen:   Neuro:   HEENT:  Resp:  CVS:  Abd:  Ext:  Skin:     Meds:      levothyroxine Injectable IV Push      dexMEDEtomidine Infusion IV Continuous  haloperidol    Injectable IntraMuscular PRN  levETIRAcetam  IVPB IV Intermittent            dextrose 5%. IV Continuous  potassium chloride  10 mEq/100 mL IVPB IV Intermittent                                  11.7   9.18  )-----------( 167      ( 13 Apr 2023 06:21 )             34.1       04-13    144  |  116<H>  |  5<L>  ----------------------------<  163<H>  3.2<L>   |  24  |  0.92    Ca    8.4<L>      13 Apr 2023 06:21  Phos  2.9     04-13  Mg     1.8     04-13    TPro  5.9<L>  /  Alb  3.1<L>  /  TBili  0.4  /  DBili  x   /  AST  50<H>  /  ALT  46  /  AlkPhos  98  04-12      CARDIAC MARKERS ( 13 Apr 2023 06:21 )  x     / x     / 572 U/L / x     / x          PT/INR - ( 13 Apr 2023 06:21 )   PT: 14.3 sec;   INR: 1.22 ratio         PTT - ( 12 Apr 2023 13:23 )  PTT:26.8 sec    .CSF CSF --   No polymorphonuclear cells seen  No organisms seen  by cytocentrifuge 04-12 @ 17:23  .Blood Blood-Peripheral   No growth to date. -- 04-10 @ 03:48  .Blood Blood-Peripheral   No growth to date. -- 04-10 @ 03:45  Clean Catch Clean Catch (Midstream)   No growth -- 04-08 @ 10:40          GLOBAL ISSUE/BEST PRACTICE:  Analgesia:Y  Sedation:Y  HOB elevation: Y  Stress ulcer prophylaxis:N  VTE prophylaxis:Y  Glycemic control:N  Nutrition:NPO     CODE STATUS:   full code    Interval Events:   s/p LP yesterday   received Haldol 4 pm and 11 pm yesterday   EEG yesterday shows no epileptiform changes   now off of precedex and on 1:1 observation   K repleted   Patient examined at bedside awake but not alert. Responsive to pain. Continues to make groaning sounds. Appears to have less repetitive jerky motions when compared to yesterday     Review of Systems:  unable to obtain     ICU Vital Signs Last 24 Hrs  T(C): 36.2 (13 Apr 2023 08:05), Max: 36.9 (12 Apr 2023 20:26)  T(F): 97.1 (13 Apr 2023 08:05), Max: 98.4 (12 Apr 2023 20:26)  HR: 71 (13 Apr 2023 09:30) (46 - 82)  BP: 131/85 (13 Apr 2023 09:30) (85/53 - 162/74)  BP(mean): 100 (13 Apr 2023 09:30) (64 - 110)  ABP: --  ABP(mean): --  RR: 30 (13 Apr 2023 09:30) (15 - 38)  SpO2: 99% (13 Apr 2023 09:30) (91% - 100%)    O2 Parameters below as of 13 Apr 2023 07:30  Patient On (Oxygen Delivery Method): room air              04-12-23 @ 07:01  -  04-13-23 @ 07:00  --------------------------------------------------------  IN: 1753.4 mL / OUT: 2700 mL / NET: -946.6 mL    04-13-23 @ 07:01  -  04-13-23 @ 09:51  --------------------------------------------------------  IN: 434.4 mL / OUT: 0 mL / NET: 434.4 mL        CAPILLARY BLOOD GLUCOSE      POCT Blood Glucose.: 116 mg/dL (12 Apr 2023 23:42)      I&O's Summary    12 Apr 2023 07:01  -  13 Apr 2023 07:00  --------------------------------------------------------  IN: 1753.4 mL / OUT: 2700 mL / NET: -946.6 mL    13 Apr 2023 07:01  -  13 Apr 2023 09:51  --------------------------------------------------------  IN: 434.4 mL / OUT: 0 mL / NET: 434.4 mL        Physical Exam:   General: Well developed male who is sedated and laying in bed.  HEENT: PERRL, clear conjunctiva, no scleral icterus  Respiratory: Decreased breath sound at the bases b/l, no wheezing, rhonchi, rales  Cardiovascular: RRR, normal S1S2, no M/R/G  Abdomen: soft, NT/ND, bowel sounds in all four quadrants, no palpable masses  Extremities: no clubbing, cyanosis, or edema  Neuro: a0x0, sedated to lethargic at times, restless, responsive to pain, gargling/moaning noises   no ankle clonus   2+ patellar reflex b/lk     Meds:      levothyroxine Injectable IV Push      dexMEDEtomidine Infusion IV Continuous  haloperidol    Injectable IntraMuscular PRN  levETIRAcetam  IVPB IV Intermittent            dextrose 5%. IV Continuous  potassium chloride  10 mEq/100 mL IVPB IV Intermittent                                  11.7   9.18  )-----------( 167      ( 13 Apr 2023 06:21 )             34.1       04-13    144  |  116<H>  |  5<L>  ----------------------------<  163<H>  3.2<L>   |  24  |  0.92    Ca    8.4<L>      13 Apr 2023 06:21  Phos  2.9     04-13  Mg     1.8     04-13    TPro  5.9<L>  /  Alb  3.1<L>  /  TBili  0.4  /  DBili  x   /  AST  50<H>  /  ALT  46  /  AlkPhos  98  04-12      CARDIAC MARKERS ( 13 Apr 2023 06:21 )  x     / x     / 572 U/L / x     / x          PT/INR - ( 13 Apr 2023 06:21 )   PT: 14.3 sec;   INR: 1.22 ratio         PTT - ( 12 Apr 2023 13:23 )  PTT:26.8 sec    .CSF CSF --   No polymorphonuclear cells seen  No organisms seen  by cytocentrifuge 04-12 @ 17:23  .Blood Blood-Peripheral   No growth to date. -- 04-10 @ 03:48  .Blood Blood-Peripheral   No growth to date. -- 04-10 @ 03:45  Clean Catch Clean Catch (Midstream)   No growth -- 04-08 @ 10:40          GLOBAL ISSUE/BEST PRACTICE:  Analgesia:Y  Sedation:Y  HOB elevation: Y  Stress ulcer prophylaxis:N  VTE prophylaxis:Y  Glycemic control:N  Nutrition:NPO     CODE STATUS:   full code    Interval Events:   s/p LP yesterday   received Haldol 4 pm and 11 pm yesterday   EEG yesterday shows no epileptiform changes   now off of precedex and on 1:1 observation   K repleted   Patient examined at bedside awake but not alert. Responsive to pain. Continues to make groaning sounds.       Review of Systems:  unable to obtain     ICU Vital Signs Last 24 Hrs  T(C): 36.2 (13 Apr 2023 08:05), Max: 36.9 (12 Apr 2023 20:26)  T(F): 97.1 (13 Apr 2023 08:05), Max: 98.4 (12 Apr 2023 20:26)  HR: 71 (13 Apr 2023 09:30) (46 - 82)  BP: 131/85 (13 Apr 2023 09:30) (85/53 - 162/74)  BP(mean): 100 (13 Apr 2023 09:30) (64 - 110)  ABP: --  ABP(mean): --  RR: 30 (13 Apr 2023 09:30) (15 - 38)  SpO2: 99% (13 Apr 2023 09:30) (91% - 100%)    O2 Parameters below as of 13 Apr 2023 07:30  Patient On (Oxygen Delivery Method): room air              04-12-23 @ 07:01  -  04-13-23 @ 07:00  --------------------------------------------------------  IN: 1753.4 mL / OUT: 2700 mL / NET: -946.6 mL    04-13-23 @ 07:01  -  04-13-23 @ 09:51  --------------------------------------------------------  IN: 434.4 mL / OUT: 0 mL / NET: 434.4 mL        CAPILLARY BLOOD GLUCOSE      POCT Blood Glucose.: 116 mg/dL (12 Apr 2023 23:42)      I&O's Summary    12 Apr 2023 07:01  -  13 Apr 2023 07:00  --------------------------------------------------------  IN: 1753.4 mL / OUT: 2700 mL / NET: -946.6 mL    13 Apr 2023 07:01  -  13 Apr 2023 09:51  --------------------------------------------------------  IN: 434.4 mL / OUT: 0 mL / NET: 434.4 mL        Physical Exam:   General: Well developed male who is sedated and laying in bed.  HEENT: PERRL, clear conjunctiva, no scleral icterus  Respiratory: Decreased breath sound at the bases b/l, no wheezing, rhonchi, rales  Cardiovascular: RRR, normal S1S2, no M/R/G  Abdomen: soft, NT/ND, bowel sounds in all four quadrants, no palpable masses  Extremities: no clubbing, cyanosis, or edema  Neuro: awake but poorly responsive, sedated to lethargic at times, +blink to threat,  responsive to pain  no ankle clonus   2+ patellar reflex b/l    Meds:      levothyroxine Injectable IV Push      dexMEDEtomidine Infusion IV Continuous  haloperidol    Injectable IntraMuscular PRN  levETIRAcetam  IVPB IV Intermittent            dextrose 5%. IV Continuous  potassium chloride  10 mEq/100 mL IVPB IV Intermittent                                  11.7   9.18  )-----------( 167      ( 13 Apr 2023 06:21 )             34.1       04-13    144  |  116<H>  |  5<L>  ----------------------------<  163<H>  3.2<L>   |  24  |  0.92    Ca    8.4<L>      13 Apr 2023 06:21  Phos  2.9     04-13  Mg     1.8     04-13    TPro  5.9<L>  /  Alb  3.1<L>  /  TBili  0.4  /  DBili  x   /  AST  50<H>  /  ALT  46  /  AlkPhos  98  04-12      CARDIAC MARKERS ( 13 Apr 2023 06:21 )  x     / x     / 572 U/L / x     / x          PT/INR - ( 13 Apr 2023 06:21 )   PT: 14.3 sec;   INR: 1.22 ratio         PTT - ( 12 Apr 2023 13:23 )  PTT:26.8 sec    .CSF CSF --   No polymorphonuclear cells seen  No organisms seen  by cytocentrifuge 04-12 @ 17:23  .Blood Blood-Peripheral   No growth to date. -- 04-10 @ 03:48  .Blood Blood-Peripheral   No growth to date. -- 04-10 @ 03:45  Clean Catch Clean Catch (Midstream)   No growth -- 04-08 @ 10:40          GLOBAL ISSUE/BEST PRACTICE:  Analgesia:Y  Sedation:Y  HOB elevation: Y  Stress ulcer prophylaxis:N  VTE prophylaxis:Y  Glycemic control:N  Nutrition:NPO     CODE STATUS:   full code    Patient with one or more new problems requiring additional work-up/treatment.

## 2023-04-13 NOTE — PROGRESS NOTE ADULT - PROBLEM SELECTOR PLAN 6
Patient recently diagnosed w/ CKD3 at [facility]  - Cr. 1.9 on admission, baseline unknown  - Continue to monitor with daily BMP  - Avoid nephrotoxic agents.  - Renal follow up

## 2023-04-14 LAB
ANION GAP SERPL CALC-SCNC: 6 MMOL/L — SIGNIFICANT CHANGE UP (ref 5–17)
BUN SERPL-MCNC: 6 MG/DL — LOW (ref 7–23)
CALCIUM SERPL-MCNC: 9.2 MG/DL — SIGNIFICANT CHANGE UP (ref 8.5–10.1)
CHLORIDE SERPL-SCNC: 114 MMOL/L — HIGH (ref 96–108)
CO2 SERPL-SCNC: 24 MMOL/L — SIGNIFICANT CHANGE UP (ref 22–31)
COVID-19 SPIKE DOMAIN AB INTERP: POSITIVE
COVID-19 SPIKE DOMAIN ANTIBODY RESULT: >250 U/ML — HIGH
CREAT SERPL-MCNC: 1 MG/DL — SIGNIFICANT CHANGE UP (ref 0.5–1.3)
CRP SERPL-MCNC: 19 MG/L — HIGH
EBV PCR: SIGNIFICANT CHANGE UP IU/ML
EGFR: 81 ML/MIN/1.73M2 — SIGNIFICANT CHANGE UP
ERYTHROCYTE [SEDIMENTATION RATE] IN BLOOD: 14 MM/HR — SIGNIFICANT CHANGE UP (ref 0–20)
GLUCOSE SERPL-MCNC: 122 MG/DL — HIGH (ref 70–99)
HCT VFR BLD CALC: 40.9 % — SIGNIFICANT CHANGE UP (ref 39–50)
HGB BLD-MCNC: 13.3 G/DL — SIGNIFICANT CHANGE UP (ref 13–17)
INR BLD: 1.23 RATIO — HIGH (ref 0.88–1.16)
LITHIUM SERPL-MCNC: 0.3 MMOL/L — LOW (ref 0.6–1.2)
MAGNESIUM SERPL-MCNC: 2 MG/DL — SIGNIFICANT CHANGE UP (ref 1.6–2.6)
MCHC RBC-ENTMCNC: 29.2 PG — SIGNIFICANT CHANGE UP (ref 27–34)
MCHC RBC-ENTMCNC: 32.5 GM/DL — SIGNIFICANT CHANGE UP (ref 32–36)
MCV RBC AUTO: 89.9 FL — SIGNIFICANT CHANGE UP (ref 80–100)
NRBC # BLD: 0 /100 WBCS — SIGNIFICANT CHANGE UP (ref 0–0)
PHOSPHATE SERPL-MCNC: 2.5 MG/DL — SIGNIFICANT CHANGE UP (ref 2.5–4.5)
PLATELET # BLD AUTO: 261 K/UL — SIGNIFICANT CHANGE UP (ref 150–400)
POTASSIUM SERPL-MCNC: 3 MMOL/L — LOW (ref 3.5–5.3)
POTASSIUM SERPL-SCNC: 3 MMOL/L — LOW (ref 3.5–5.3)
PROTHROM AB SERPL-ACNC: 14.4 SEC — HIGH (ref 10.5–13.4)
RBC # BLD: 4.55 M/UL — SIGNIFICANT CHANGE UP (ref 4.2–5.8)
RBC # FLD: 13.1 % — SIGNIFICANT CHANGE UP (ref 10.3–14.5)
SARS-COV-2 IGG+IGM SERPL QL IA: >250 U/ML — HIGH
SARS-COV-2 IGG+IGM SERPL QL IA: POSITIVE
SODIUM SERPL-SCNC: 144 MMOL/L — SIGNIFICANT CHANGE UP (ref 135–145)
WBC # BLD: 15.56 K/UL — HIGH (ref 3.8–10.5)
WBC # FLD AUTO: 15.56 K/UL — HIGH (ref 3.8–10.5)

## 2023-04-14 PROCEDURE — 99233 SBSQ HOSP IP/OBS HIGH 50: CPT | Mod: GC

## 2023-04-14 PROCEDURE — 99232 SBSQ HOSP IP/OBS MODERATE 35: CPT

## 2023-04-14 PROCEDURE — 99231 SBSQ HOSP IP/OBS SF/LOW 25: CPT

## 2023-04-14 RX ORDER — LABETALOL HCL 100 MG
10 TABLET ORAL EVERY 6 HOURS
Refills: 0 | Status: DISCONTINUED | OUTPATIENT
Start: 2023-04-14 | End: 2023-04-20

## 2023-04-14 RX ORDER — POTASSIUM CHLORIDE 20 MEQ
10 PACKET (EA) ORAL
Refills: 0 | Status: COMPLETED | OUTPATIENT
Start: 2023-04-14 | End: 2023-04-14

## 2023-04-14 RX ORDER — LABETALOL HCL 100 MG
10 TABLET ORAL ONCE
Refills: 0 | Status: COMPLETED | OUTPATIENT
Start: 2023-04-14 | End: 2023-04-14

## 2023-04-14 RX ADMIN — SODIUM CHLORIDE 80 MILLILITER(S): 9 INJECTION, SOLUTION INTRAVENOUS at 13:32

## 2023-04-14 RX ADMIN — ENOXAPARIN SODIUM 40 MILLIGRAM(S): 100 INJECTION SUBCUTANEOUS at 00:03

## 2023-04-14 RX ADMIN — HALOPERIDOL DECANOATE 2.5 MILLIGRAM(S): 100 INJECTION INTRAMUSCULAR at 02:10

## 2023-04-14 RX ADMIN — LEVETIRACETAM 400 MILLIGRAM(S): 250 TABLET, FILM COATED ORAL at 05:21

## 2023-04-14 RX ADMIN — Medication 100 MILLIEQUIVALENT(S): at 14:40

## 2023-04-14 RX ADMIN — LEVETIRACETAM 400 MILLIGRAM(S): 250 TABLET, FILM COATED ORAL at 17:20

## 2023-04-14 RX ADMIN — HALOPERIDOL DECANOATE 2.5 MILLIGRAM(S): 100 INJECTION INTRAMUSCULAR at 11:16

## 2023-04-14 RX ADMIN — Medication 100 MILLIEQUIVALENT(S): at 16:31

## 2023-04-14 RX ADMIN — ENOXAPARIN SODIUM 40 MILLIGRAM(S): 100 INJECTION SUBCUTANEOUS at 23:37

## 2023-04-14 RX ADMIN — SODIUM CHLORIDE 80 MILLILITER(S): 9 INJECTION, SOLUTION INTRAVENOUS at 01:58

## 2023-04-14 RX ADMIN — Medication 100 MILLIEQUIVALENT(S): at 17:55

## 2023-04-14 RX ADMIN — Medication 10 MILLIGRAM(S): at 17:19

## 2023-04-14 RX ADMIN — Medication 18.75 MICROGRAM(S): at 21:43

## 2023-04-14 NOTE — CHART NOTE - NSCHARTNOTEFT_GEN_A_CORE
outpt MRI report received from PCP Dr. Patel Mcfarlane  placed in paper chart    MRI on 3/9/2023  Impression:   1. Mild chronic nonspecific white matter changes  2. Mild generalized parenchymal volume loss.

## 2023-04-14 NOTE — PROGRESS NOTE ADULT - SUBJECTIVE AND OBJECTIVE BOX
[facility]  INFECTIOUS DISEASES - [address], [place] [address]  Tel: [phone]     Fax: [phone]  =======================================================    MAXI FANG [id number]    Follow up: No fevers. Seen earlier today.     Allergies:  No Known Allergies      Antibiotics:  dextrose 5%. 1000 milliLiter(s) IV Continuous <Continuous>  enoxaparin Injectable 40 milliGRAM(s) SubCutaneous every 24 hours  haloperidol    Injectable 2.5 milliGRAM(s) IntraMuscular every 6 hours PRN  labetalol Injectable 10 milliGRAM(s) IV Push every 6 hours PRN  levETIRAcetam  IVPB 500 milliGRAM(s) IV Intermittent every 12 hours  levothyroxine Injectable 18.75 MICROGram(s) IV Push at bedtime       REVIEW OF SYSTEMS:  unable to obtain, confused     Physical Exam:  ICU Vital Signs Last 24 Hrs  T(C): 36.5 (14 Apr 2023 23:19), Max: 37.4 (14 Apr 2023 04:29)  T(F): 97.7 (14 Apr 2023 23:19), Max: 99.4 (14 Apr 2023 04:29)  HR: 79 (15 Apr 2023 00:00) (79 - 101)  BP: 152/67 (15 Apr 2023 00:00) (131/82 - 181/105)  BP(mean): 97 (15 Apr 2023 00:00) (94 - 132)  ABP: --  ABP(mean): --  RR: 22 (15 Apr 2023 00:00) (12 - 26)  SpO2: 90% (15 Apr 2023 00:00) (90% - 99%)    O2 Parameters below as of 14 Apr 2023 19:00  Patient On (Oxygen Delivery Method): room air      GEN: awake, not in apparent distress  HEENT: normocephalic and atraumatic.  NECK: Supple.   LUNGS: Clear to auscultation.  HEART: Regular rate and rhythm   ABDOMEN: Soft, nontender, and nondistended.    EXTREMITIES: No leg edema.  NEUROLOGIC: unable to assess, not answering questions appropriately, not following commands       Labs:  04-14    144  |  114<H>  |  6<L>  ----------------------------<  122<H>  3.0<L>   |  24  |  1.00    Ca    9.2      14 Apr 2023 05:30  Phos  2.5     04-14  Mg     2.0     04-14                            13.3   15.56 )-----------( 261      ( 14 Apr 2023 05:30 )             40.9     PT/INR - ( 14 Apr 2023 05:30 )   PT: 14.4 sec;   INR: 1.23 ratio                 RECENT CULTURES:  04-12 @ 17:23 .CSF CSF     No growth    No polymorphonuclear cells seen  No organisms seen  by cytocentrifuge      04-10 @ 03:48 .Blood Blood-Peripheral     No growth to date.        04-10 @ 03:45 .Blood Blood-Peripheral     No growth to date.        04-08 @ 10:40 Clean Catch Clean Catch (Midstream)     No growth        04-08 @ 07:00 .Blood Blood-Peripheral     No Growth Final              All imaging and data are reviewed.

## 2023-04-14 NOTE — PROGRESS NOTE ADULT - ASSESSMENT
68 y/o male PMH Bipolar disease (diagnosed 2005), CKD3, hypothyroidism, Urinary retention, insomnia, who presented with altered mental status with increased weakness and decreased po intake. Changes in mental status started about 4 weeks ago per wife. Unclear etiology of AMS. Concern for lithium toxicity vs autoimmune vs paraneoplastic encephalopathy vs seizures. Lithium levels now wnl.    LP done 4/12 to evaluate for infectious encephalitis (CSF cell count 1, glucose 79, protein 59). CSF PCR negative and culture remains no growth. He has no more fever although WBC increased to 15 today. No obvious new signs of infection, will monitor. Multiple blood cultures and urine cultures remain no growth.     COVID Rashaad antibody levels positive--discussed with wife over the phone that this is likely due to patient being vaccinated for COVID (received Moderna). She says he was vaccinated about 1.5 years ago, and had COVID Jan 2022. He was not hospitalized that time and received monoclonal antibody.    -monitor WBC--consider CT chest and A/P in increasing  -follow up CSF culture, VDRL, Lyme, protein 14-3-3  -discussed with wife over the phone  -discussed with ICU team  -I will be covered by Dr. Antonieta Thomas on 4/15, and Dr. [address] on 4/16    Jenna Powell MD  Division of Infectious Diseases   Cell [phone] between 8am and 6pm   After 6pm and weekends please call ID service at [phone].

## 2023-04-14 NOTE — PROGRESS NOTE ADULT - SUBJECTIVE AND OBJECTIVE BOX
Neurology Follow up note(covering dr ochoa)    MAXI [id number]    HPI:  66 y/o male PMH BPD (diagnosed 2005), CKD3, hypothyroidism Urinary retention and insomnia presents to the ED for altered mental status with increased weakness and decreased po intake. Patient unable to answer questions secondary to mental status. History obtained from patient's wife and son via telephone. Patient was recently admitted to [facility] for a manic episode ~3 weeks ago. There his dose of Bupropion was decreased 150mg TID to once daily, he also had a normal CT head and was diagnosed with CKD3.  Soon after discharge, patient began to have increased weakness, unsteady gait with multiple mechanical falls, slurred speech, and decreased po intake. These symptoms seemed to have a rapid onset ~10 days and have not improved. His son also notes, he seemed to have pain with swallowing as well.   Of note, patient's mom also had hx of Alzheimer's dementia and parkinson's disease which she was diagnosed with in her 70s and family is concerned he might be developing a neurological disorder.   At time of exam, patient unable to answer questions or follow commands.     ED Course  T: 96.6F HR 98 /83 RR 20 SpO2 98% on RA  Labs:  wbc 12.30 PT/INR 13.9/1.19 BUN/Cr 24/1.90  UA: negative  CXR: grossly clear (f/u official read)  EKG: NSR HR 80  CT Head: Grossly, no acute hemorrhage mass or mass effect seen. Better  CT is recommended when patient can tolerate it or sedation be given.    In the ED, pt given NS 1L bolus x1, Ativan 1mg IVPx1   (08 Apr 2023 13:21)      Interval History -more interactive    Patient is seen, chart was reviewed and case was discussed with the treatment team.  Pt is not in any distress.   Lying on bed comfortably.       Vital Signs Last 24 Hrs  T(C): 36.7 (14 Apr 2023 15:40), Max: 37.4 (14 Apr 2023 04:29)  T(F): 98 (14 Apr 2023 15:40), Max: 99.4 (14 Apr 2023 04:29)  HR: 101 (14 Apr 2023 17:00) (83 - 102)  BP: 161/85 (14 Apr 2023 17:00) (150/78 - 193/91)  BP(mean): 115 (14 Apr 2023 17:00) (106 - 134)  RR: 18 (14 Apr 2023 17:00) (12 - 26)  SpO2: 95% (14 Apr 2023 17:00) (94% - 99%)    Parameters below as of 14 Apr 2023 07:00  Patient On (Oxygen Delivery Method): room air              r            REVIEW OF SYSTEMS:      limited due to poor mentation  not in any distress/pain  On Neurological Examination:    Mental Status - Pt is awake following simple commands      Speech -  Pt has dysarthria.    Cranial Nerves - Pupils 3 mm equal and reactive to light, extraocular eye movements intact. Pt has no visual field deficit.  Pt has no  facial asymmetry. Facial sensation is intact.Tongue - is in midline.    Muscle tone - is normal      Motor Exam - 4/5 all over, No drift. No shaking or tremors.    Sensory Exam - Pt withdraws all extremities equally on stimulation. No asymmetry seen. No complaints of tingling, numbness.    coordination:    Finger to nose: normal    Deep tendon Reflexes - 2 plus all over.          Neck Supple -  Yes.     MEDICATIONS  (STANDING):  dextrose 5%. 1000 milliLiter(s) (80 mL/Hr) IV Continuous <Continuous>  enoxaparin Injectable 40 milliGRAM(s) SubCutaneous every 24 hours  labetalol Injectable 10 milliGRAM(s) IV Push once  levETIRAcetam  IVPB 500 milliGRAM(s) IV Intermittent every 12 hours  levothyroxine Injectable 18.75 MICROGram(s) IV Push at bedtime  potassium chloride  10 mEq/100 mL IVPB 10 milliEquivalent(s) IV Intermittent every 1 hour    MEDICATIONS  (PRN):  haloperidol    Injectable 2.5 milliGRAM(s) IntraMuscular every 6 hours PRN Agitation  labetalol Injectable 10 milliGRAM(s) IV Push every 6 hours PRN Systolic blood pressure >160    Allergies    No Known Allergies    Intolerances                          13.3   15.56 )-----------( 261      ( 14 Apr 2023 05:30 )             40.9   04-14    144  |  114<H>  |  6<L>  ----------------------------<  122<H>  3.0<L>   |  24  |  1.00    Ca    9.2      14 Apr 2023 05:30  Phos  2.5     04-14  Mg     2.0     04-14        Hemoglobin A1C:     Vitamin B12 Vitamin B12, Serum: 692 pg/mL (04-10 @ 08:30)      RADIOLOGY    ASSESSMENT AND PLAN:      seen for ams  sp lithium toxicity  still encephalopathic ?non convulsive sz    would consider a trial iv solumendrol  CSF so far unremarkable for CNS infection  on keppra  would follow up autoimmune profile  management dw critical care team and patient wife at bed side  Pain is accessed and addressed.  Would continue to follow.

## 2023-04-14 NOTE — PROGRESS NOTE ADULT - PROBLEM SELECTOR PLAN 6
Patient recently diagnosed w/ CKD3 at [facility]  - Cr. 1.9 on admission, baseline unknown  - Continue to monitor with daily BMP  - Avoid nephrotoxic agents.  - Renal follow up

## 2023-04-14 NOTE — PROGRESS NOTE ADULT - PROBLEM SELECTOR PLAN 1
AMS in the setting of progressive dementia vs. medications vs. electrolyte imbalance  - Transferred to ICU to be started on Precedex drip b/c patient became acutely delirious   - Abnormal EEG of unclear significance -- started on empiric Keppra   - S/P LP no evidence of encephalitis  - ID consult with dr. woody  - F/u UCx, BCx x2 -- NTD  - Neuro follow up  - Psych (Dr. Floyd) consulted, f/u recs

## 2023-04-14 NOTE — PROGRESS NOTE ADULT - PROBLEM SELECTOR PLAN 2
Chronic, diagnosed 2005.  - Recent admission to [facility] for manic episode  - Hold lithium 2/2 litihium toxicity  - Psych (Dr. Floyd) consulted, appreciate recs

## 2023-04-14 NOTE — PROGRESS NOTE ADULT - SUBJECTIVE AND OBJECTIVE BOX
INTERVAL HPI/OVERNIGHT EVENTS:    SUBJECTIVE: Patient seen and examined at bedside.     ROS:  CV: Denies chest pain  Resp: Denies SOB  GI: Denies abdominal pain, constipation, diarrhea, nausea, vomiting  : Denies dysuria  ID: Denies fevers, chills  MSK: Denies joint pain     OBJECTIVE:    VITAL SIGNS:  ICU Vital Signs Last 24 Hrs  T(C): 36.9 (14 Apr 2023 13:00), Max: 37.4 (14 Apr 2023 04:29)  T(F): 98.4 (14 Apr 2023 13:00), Max: 99.4 (14 Apr 2023 04:29)  HR: 101 (14 Apr 2023 13:00) (83 - 107)  BP: 161/78 (14 Apr 2023 13:00) (135/86 - 193/91)  BP(mean): 109 (14 Apr 2023 13:00) (105 - 134)  ABP: --  ABP(mean): --  RR: 21 (14 Apr 2023 13:00) (12 - 33)  SpO2: 95% (14 Apr 2023 13:00) (95% - 100%)    O2 Parameters below as of 14 Apr 2023 07:00  Patient On (Oxygen Delivery Method): room air              04-13 @ 07:01 - 04-14 @ 07:00  --------------------------------------------------------  IN: 2523.8 mL / OUT: 2400 mL / NET: 123.8 mL    04-14 @ 07:01 - 04-14 @ 13:32  --------------------------------------------------------  IN: 560 mL / OUT: 550 mL / NET: 10 mL      CAPILLARY BLOOD GLUCOSE      POCT Blood Glucose.: 116 mg/dL (12 Apr 2023 23:42)      PHYSICAL EXAM:    General: NAD, comfortable  HEENT: NCAT, PERRL, clear conjunctiva, no scleral icterus  Neck: supple, no JVD  Respiratory: CTA b/l, no wheezing, rhonchi, rales  Cardiovascular: RRR, normal S1S2, no M/R/G  Abdomen: soft, NT/ND, bowel sounds in all four quadrants, no palpable masses  Extremities: WWP, no clubbing, cyanosis, or edema  Neuro:     MEDICATIONS:  MEDICATIONS  (STANDING):  dextrose 5%. 1000 milliLiter(s) (80 mL/Hr) IV Continuous <Continuous>  enoxaparin Injectable 40 milliGRAM(s) SubCutaneous every 24 hours  labetalol Injectable 10 milliGRAM(s) IV Push once  levETIRAcetam  IVPB 500 milliGRAM(s) IV Intermittent every 12 hours  levothyroxine Injectable 18.75 MICROGram(s) IV Push at bedtime    MEDICATIONS  (PRN):  haloperidol    Injectable 2.5 milliGRAM(s) IntraMuscular every 6 hours PRN Agitation      ALLERGIES:  Allergies    No Known Allergies    Intolerances        LABS:                        13.3   15.56 )-----------( 261      ( 14 Apr 2023 05:30 )             40.9     04-14    144  |  114<H>  |  6<L>  ----------------------------<  122<H>  3.0<L>   |  24  |  1.00    Ca    9.2      14 Apr 2023 05:30  Phos  2.5     04-14  Mg     2.0     04-14      PT/INR - ( 14 Apr 2023 05:30 )   PT: 14.4 sec;   INR: 1.23 ratio               RADIOLOGY & ADDITIONAL TESTS: Reviewed. INTERVAL HPI/OVERNIGHT EVENTS:    SUBJECTIVE: Patient seen and examined at bedside. Overnight Precedex was stopped. Patient continued on Haldol 2.5 mg prn and required 3 doses. Currently patient is on 1:1 constant observation. Seems Awake and Alert. Holding eye contact, and responding appropriately to direct questioning.     ROS:  Unable to obtain due to sedation and lethargy     OBJECTIVE:    VITAL SIGNS:  ICU Vital Signs Last 24 Hrs  T(C): 36.9 (14 Apr 2023 13:00), Max: 37.4 (14 Apr 2023 04:29)  T(F): 98.4 (14 Apr 2023 13:00), Max: 99.4 (14 Apr 2023 04:29)  HR: 101 (14 Apr 2023 13:00) (83 - 107)  BP: 161/78 (14 Apr 2023 13:00) (135/86 - 193/91)  BP(mean): 109 (14 Apr 2023 13:00) (105 - 134)  ABP: --  ABP(mean): --  RR: 21 (14 Apr 2023 13:00) (12 - 33)  SpO2: 95% (14 Apr 2023 13:00) (95% - 100%)    O2 Parameters below as of 14 Apr 2023 07:00  Patient On (Oxygen Delivery Method): room air              04-13 @ 07:01 - 04-14 @ 07:00  --------------------------------------------------------  IN: 2523.8 mL / OUT: 2400 mL / NET: 123.8 mL    04-14 @ 07:01  -  04-14 @ 13:32  --------------------------------------------------------  IN: 560 mL / OUT: 550 mL / NET: 10 mL      CAPILLARY BLOOD GLUCOSE      POCT Blood Glucose.: 116 mg/dL (12 Apr 2023 23:42)      PHYSICAL EXAM:  General: Well developed male who is sedated and laying in bed.  HEENT: PERRL, clear conjunctiva, no scleral icterus  Respiratory: CTA bilaterally, no wheezing, rhonchi, rales  Cardiovascular: RRR, normal S1S2, no M/R/G  Abdomen: soft, NT/ND, bowel sounds in all four quadrants, no palpable masses  Extremities: no clubbing, cyanosis, or edema  Neuro: awake and alert, responds appropriately to direct questioning. Holds eye contact, seems sedated and lethargic at times. Tone normal and strength seems to be 5/5.       MEDICATIONS:  MEDICATIONS  (STANDING):  dextrose 5%. 1000 milliLiter(s) (80 mL/Hr) IV Continuous <Continuous>  enoxaparin Injectable 40 milliGRAM(s) SubCutaneous every 24 hours  labetalol Injectable 10 milliGRAM(s) IV Push once  levetiracetam  IVPB 500 milliGRAM(s) IV Intermittent every 12 hours  levothyroxine Injectable 18.75 MICROGram(s) IV Push at bedtime    MEDICATIONS  (PRN):  haloperidol    Injectable 2.5 milliGRAM(s) IntraMuscular every 6 hours PRN Agitation      ALLERGIES:  Allergies    No Known Allergies    Intolerances        LABS:                        13.3   15.56 )-----------( 261      ( 14 Apr 2023 05:30 )             40.9     04-14    144  |  114<H>  |  6<L>  ----------------------------<  122<H>  3.0<L>   |  24  |  1.00    Ca    9.2      14 Apr 2023 05:30  Phos  2.5     04-14  Mg     2.0     04-14      PT/INR - ( 14 Apr 2023 05:30 )   PT: 14.4 sec;   INR: 1.23 ratio               RADIOLOGY & ADDITIONAL TESTS: Reviewed. INTERVAL HPI/OVERNIGHT EVENTS:    SUBJECTIVE: Patient seen and examined at bedside. yesterday Precedex was stopped. Patient continued on Haldol 2.5 mg prn and required 3 doses in last 24h. Currently patient is on 1:1 constant observation. Seems Awake and Alert. Holding eye contact, and responding appropriately to direct questioning.     ROS:  Unable to obtain due to sedation and lethargy     OBJECTIVE:    VITAL SIGNS:  ICU Vital Signs Last 24 Hrs  T(C): 36.9 (14 Apr 2023 13:00), Max: 37.4 (14 Apr 2023 04:29)  T(F): 98.4 (14 Apr 2023 13:00), Max: 99.4 (14 Apr 2023 04:29)  HR: 101 (14 Apr 2023 13:00) (83 - 107)  BP: 161/78 (14 Apr 2023 13:00) (135/86 - 193/91)  BP(mean): 109 (14 Apr 2023 13:00) (105 - 134)  ABP: --  ABP(mean): --  RR: 21 (14 Apr 2023 13:00) (12 - 33)  SpO2: 95% (14 Apr 2023 13:00) (95% - 100%)    O2 Parameters below as of 14 Apr 2023 07:00  Patient On (Oxygen Delivery Method): room air              04-13 @ 07:01 - 04-14 @ 07:00  --------------------------------------------------------  IN: 2523.8 mL / OUT: 2400 mL / NET: 123.8 mL    04-14 @ 07:01 - 04-14 @ 13:32  --------------------------------------------------------  IN: 560 mL / OUT: 550 mL / NET: 10 mL      CAPILLARY BLOOD GLUCOSE      POCT Blood Glucose.: 116 mg/dL (12 Apr 2023 23:42)      PHYSICAL EXAM:  General: Well developed male who is sedated and laying in bed.  HEENT: PERRL, clear conjunctiva, no scleral icterus  Respiratory: CTA bilaterally, no wheezing, rhonchi, rales  Cardiovascular: RRR, normal S1S2, no M/R/G  Abdomen: soft, NT/ND, bowel sounds in all four quadrants, no palpable masses  Extremities: no clubbing, cyanosis, or edema  Neuro: awake and alert, responds appropriately to direct questioning. Holds eye contact, able to give name, age    MEDICATIONS:  MEDICATIONS  (STANDING):  dextrose 5%. 1000 milliLiter(s) (80 mL/Hr) IV Continuous <Continuous>  enoxaparin Injectable 40 milliGRAM(s) SubCutaneous every 24 hours  labetalol Injectable 10 milliGRAM(s) IV Push once  levetiracetam  IVPB 500 milliGRAM(s) IV Intermittent every 12 hours  levothyroxine Injectable 18.75 MICROGram(s) IV Push at bedtime    MEDICATIONS  (PRN):  haloperidol    Injectable 2.5 milliGRAM(s) IntraMuscular every 6 hours PRN Agitation      ALLERGIES:  Allergies    No Known Allergies    Intolerances        LABS:                        13.3   15.56 )-----------( 261      ( 14 Apr 2023 05:30 )             40.9     04-14    144  |  114<H>  |  6<L>  ----------------------------<  122<H>  3.0<L>   |  24  |  1.00    Ca    9.2      14 Apr 2023 05:30  Phos  2.5     04-14  Mg     2.0     04-14      PT/INR - ( 14 Apr 2023 05:30 )   PT: 14.4 sec;   INR: 1.23 ratio               RADIOLOGY & ADDITIONAL TESTS: Reviewed.

## 2023-04-14 NOTE — PROGRESS NOTE ADULT - SUBJECTIVE AND OBJECTIVE BOX
Patient is a 67y old  Male who presents with a chief complaint of AMS (13 Apr 2023 12:00)      INTERVAL /OVERNIGHT EVENTS: agitated    Vital Signs Last 24 Hrs  T(C): 36.9 (14 Apr 2023 13:00), Max: 37.4 (14 Apr 2023 04:29)  T(F): 98.4 (14 Apr 2023 13:00), Max: 99.4 (14 Apr 2023 04:29)  HR: 101 (14 Apr 2023 13:00) (83 - 107)  BP: 161/78 (14 Apr 2023 13:00) (135/86 - 193/91)  BP(mean): 109 (14 Apr 2023 13:00) (105 - 134)  RR: 21 (14 Apr 2023 13:00) (12 - 33)  SpO2: 95% (14 Apr 2023 13:00) (95% - 100%)    Parameters below as of 14 Apr 2023 07:00  Patient On (Oxygen Delivery Method): room air        04-14    144  |  114<H>  |  6<L>  ----------------------------<  122<H>  3.0<L>   |  24  |  1.00    Ca    9.2      14 Apr 2023 05:30  Phos  2.5     04-14  Mg     2.0     04-14                            13.3   15.56 )-----------( 261      ( 14 Apr 2023 05:30 )             40.9     PT/INR - ( 14 Apr 2023 05:30 )   PT: 14.4 sec;   INR: 1.23 ratio         PTT - ( 12 Apr 2023 13:23 )  PTT:26.8 sec  CAPILLARY BLOOD GLUCOSE                  dextrose 5%. 1000 milliLiter(s) IV Continuous <Continuous>  enoxaparin Injectable 40 milliGRAM(s) SubCutaneous every 24 hours  haloperidol    Injectable 2.5 milliGRAM(s) IntraMuscular every 6 hours PRN  labetalol Injectable 10 milliGRAM(s) IV Push once  levETIRAcetam  IVPB 500 milliGRAM(s) IV Intermittent every 12 hours  levothyroxine Injectable 18.75 MICROGram(s) IV Push at bedtime            REVIEW OF SYSTEMS:  unable to obtain      PHYSICAL EXAM:  GENERAL: NAD  HEAD:  Atraumatic, Normocephalic  EYES: EOMI, PERRLA, conjunctiva and sclera clear  ENMT: No tonsillar erythema, exudates, or enlargement; Moist mucous membranes  NECK: Supple, No JVD  NERVOUS SYSTEM:  agitated  CHEST/LUNG: Clear to auscultation bilaterally; No rales, rhonchi, wheezing,  HEART: Regular rate and rhythm  ABDOMEN: Soft, Nontender, Nondistended; Bowel sounds present  EXTREMITIES:  No clubbing, cyanosis, or edema        Advanced care planning discussed with patient/family [X] YES   [ ] NO    Advanced care planning discussed with patient/family. Patient's health status was discussed. All appropriate changes have been made regarding patient's end-of-life care. Advanced care planning forms reviewed/discussed/completed.  20 minutes spent.

## 2023-04-15 LAB
ANION GAP SERPL CALC-SCNC: 7 MMOL/L — SIGNIFICANT CHANGE UP (ref 5–17)
BUN SERPL-MCNC: 7 MG/DL — SIGNIFICANT CHANGE UP (ref 7–23)
CALCIUM SERPL-MCNC: 9.6 MG/DL — SIGNIFICANT CHANGE UP (ref 8.5–10.1)
CHLORIDE SERPL-SCNC: 108 MMOL/L — SIGNIFICANT CHANGE UP (ref 96–108)
CO2 SERPL-SCNC: 25 MMOL/L — SIGNIFICANT CHANGE UP (ref 22–31)
CREAT SERPL-MCNC: 1 MG/DL — SIGNIFICANT CHANGE UP (ref 0.5–1.3)
CULTURE RESULTS: NO GROWTH — SIGNIFICANT CHANGE UP
CULTURE RESULTS: SIGNIFICANT CHANGE UP
CULTURE RESULTS: SIGNIFICANT CHANGE UP
EGFR: 81 ML/MIN/1.73M2 — SIGNIFICANT CHANGE UP
GLUCOSE SERPL-MCNC: 140 MG/DL — HIGH (ref 70–99)
HCT VFR BLD CALC: 42.8 % — SIGNIFICANT CHANGE UP (ref 39–50)
HGB BLD-MCNC: 14.2 G/DL — SIGNIFICANT CHANGE UP (ref 13–17)
LITHIUM SERPL-MCNC: <0.2 MMOL/L — LOW (ref 0.6–1.2)
MAGNESIUM SERPL-MCNC: 1.9 MG/DL — SIGNIFICANT CHANGE UP (ref 1.6–2.6)
MCHC RBC-ENTMCNC: 30.2 PG — SIGNIFICANT CHANGE UP (ref 27–34)
MCHC RBC-ENTMCNC: 33.2 GM/DL — SIGNIFICANT CHANGE UP (ref 32–36)
MCV RBC AUTO: 91.1 FL — SIGNIFICANT CHANGE UP (ref 80–100)
NRBC # BLD: 0 /100 WBCS — SIGNIFICANT CHANGE UP (ref 0–0)
PHOSPHATE SERPL-MCNC: 2.4 MG/DL — LOW (ref 2.5–4.5)
PLATELET # BLD AUTO: 239 K/UL — SIGNIFICANT CHANGE UP (ref 150–400)
POTASSIUM SERPL-MCNC: 3.4 MMOL/L — LOW (ref 3.5–5.3)
POTASSIUM SERPL-SCNC: 3.4 MMOL/L — LOW (ref 3.5–5.3)
RBC # BLD: 4.7 M/UL — SIGNIFICANT CHANGE UP (ref 4.2–5.8)
RBC # FLD: 12.8 % — SIGNIFICANT CHANGE UP (ref 10.3–14.5)
SODIUM SERPL-SCNC: 140 MMOL/L — SIGNIFICANT CHANGE UP (ref 135–145)
SPECIMEN SOURCE: SIGNIFICANT CHANGE UP
WBC # BLD: 18.7 K/UL — HIGH (ref 3.8–10.5)
WBC # FLD AUTO: 18.7 K/UL — HIGH (ref 3.8–10.5)

## 2023-04-15 PROCEDURE — 99232 SBSQ HOSP IP/OBS MODERATE 35: CPT | Mod: GC

## 2023-04-15 RX ORDER — LEVOTHYROXINE SODIUM 125 MCG
25 TABLET ORAL DAILY
Refills: 0 | Status: DISCONTINUED | OUTPATIENT
Start: 2023-04-15 | End: 2023-04-24

## 2023-04-15 RX ORDER — MAGNESIUM SULFATE 500 MG/ML
2 VIAL (ML) INJECTION ONCE
Refills: 0 | Status: COMPLETED | OUTPATIENT
Start: 2023-04-15 | End: 2023-04-15

## 2023-04-15 RX ORDER — AMLODIPINE BESYLATE 2.5 MG/1
5 TABLET ORAL DAILY
Refills: 0 | Status: DISCONTINUED | OUTPATIENT
Start: 2023-04-15 | End: 2023-04-15

## 2023-04-15 RX ORDER — POTASSIUM PHOSPHATE, MONOBASIC POTASSIUM PHOSPHATE, DIBASIC 236; 224 MG/ML; MG/ML
15 INJECTION, SOLUTION INTRAVENOUS ONCE
Refills: 0 | Status: COMPLETED | OUTPATIENT
Start: 2023-04-15 | End: 2023-04-15

## 2023-04-15 RX ADMIN — Medication 25 GRAM(S): at 07:40

## 2023-04-15 RX ADMIN — ENOXAPARIN SODIUM 40 MILLIGRAM(S): 100 INJECTION SUBCUTANEOUS at 23:24

## 2023-04-15 RX ADMIN — LEVETIRACETAM 400 MILLIGRAM(S): 250 TABLET, FILM COATED ORAL at 05:09

## 2023-04-15 RX ADMIN — HALOPERIDOL DECANOATE 2.5 MILLIGRAM(S): 100 INJECTION INTRAMUSCULAR at 12:44

## 2023-04-15 RX ADMIN — Medication 25 MICROGRAM(S): at 11:33

## 2023-04-15 RX ADMIN — AMLODIPINE BESYLATE 5 MILLIGRAM(S): 2.5 TABLET ORAL at 11:33

## 2023-04-15 RX ADMIN — HALOPERIDOL DECANOATE 2.5 MILLIGRAM(S): 100 INJECTION INTRAMUSCULAR at 03:10

## 2023-04-15 RX ADMIN — SODIUM CHLORIDE 80 MILLILITER(S): 9 INJECTION, SOLUTION INTRAVENOUS at 02:23

## 2023-04-15 RX ADMIN — POTASSIUM PHOSPHATE, MONOBASIC POTASSIUM PHOSPHATE, DIBASIC 62.5 MILLIMOLE(S): 236; 224 INJECTION, SOLUTION INTRAVENOUS at 11:28

## 2023-04-15 RX ADMIN — LEVETIRACETAM 400 MILLIGRAM(S): 250 TABLET, FILM COATED ORAL at 17:04

## 2023-04-15 RX ADMIN — HALOPERIDOL DECANOATE 2.5 MILLIGRAM(S): 100 INJECTION INTRAMUSCULAR at 23:37

## 2023-04-15 NOTE — PROGRESS NOTE ADULT - PROBLEM SELECTOR PLAN 2
Chronic, diagnosed 2005.  - Recent admission to [facility] for manic episode  - Hold lithium 2/2 litihium toxicity  - Psych (Dr. Floyd) consulted, appreciate recs

## 2023-04-15 NOTE — PROGRESS NOTE ADULT - SUBJECTIVE AND OBJECTIVE BOX
Neurology Follow up note(covering dr ochoa)    MAXI [id number]    HPI:  66 y/o male PMH BPD (diagnosed 2005), CKD3, hypothyroidism Urinary retention and insomnia presents to the ED for altered mental status with increased weakness and decreased po intake. Patient unable to answer questions secondary to mental status. History obtained from patient's wife and son via telephone. Patient was recently admitted to [facility] for a manic episode ~3 weeks ago. There his dose of Bupropion was decreased 150mg TID to once daily, he also had a normal CT head and was diagnosed with CKD3.  Soon after discharge, patient began to have increased weakness, unsteady gait with multiple mechanical falls, slurred speech, and decreased po intake. These symptoms seemed to have a rapid onset ~10 days and have not improved. His son also notes, he seemed to have pain with swallowing as well.   Of note, patient's mom also had hx of Alzheimer's dementia and parkinson's disease which she was diagnosed with in her 70s and family is concerned he might be developing a neurological disorder.   At time of exam, patient unable to answer questions or follow commands.     ED Course  T: 96.6F HR 98 /83 RR 20 SpO2 98% on RA  Labs:  wbc 12.30 PT/INR 13.9/1.19 BUN/Cr 24/1.90  UA: negative  CXR: grossly clear (f/u official read)  EKG: NSR HR 80  CT Head: Grossly, no acute hemorrhage mass or mass effect seen. Better  CT is recommended when patient can tolerate it or sedation be given.    In the ED, pt given NS 1L bolus x1, Ativan 1mg IVPx1   (08 Apr 2023 13:21)      Interval History -more communicative    Patient is seen, chart was reviewed and case was discussed with the treatment team.  Pt is not in any distress.   Lying on bed comfortably.       Vital Signs Last 24 Hrs  T(C): 37.4 (15 Apr 2023 08:01), Max: 37.4 (15 Apr 2023 08:01)  T(F): 99.3 (15 Apr 2023 08:01), Max: 99.3 (15 Apr 2023 08:01)  HR: 90 (15 Apr 2023 11:00) (78 - 101)  BP: 120/84 (15 Apr 2023 11:00) (111/65 - 172/77)  BP(mean): 91 (15 Apr 2023 11:00) (79 - 116)  RR: 33 (15 Apr 2023 11:00) (15 - 33)  SpO2: 97% (15 Apr 2023 11:00) (90% - 99%)    Parameters below as of 14 Apr 2023 19:00  Patient On (Oxygen Delivery Method): room air                  r            REVIEW OF SYSTEMS:      limited due to poor mentation  not in any distress/pain  On Neurological Examination:    Mental Status - Pt is awake following simple commands      Speech -  Pt has dysarthria.    Cranial Nerves - Pupils 3 mm equal and reactive to light, extraocular eye movements intact. Pt has no visual field deficit.  Pt has no  facial asymmetry. Facial sensation is intact.Tongue - is in midline.    Muscle tone - is normal      Motor Exam - 4/5 all over, No drift. No shaking or tremors.    Sensory Exam - Pt withdraws all extremities equally on stimulation. No asymmetry seen. No complaints of tingling, numbness.    coordination:    Finger to nose: normal    Deep tendon Reflexes - 2 plus all over.          Neck Supple -  Yes.     MEDICATIONS  (STANDING):  dextrose 5%. 1000 milliLiter(s) (80 mL/Hr) IV Continuous <Continuous>  enoxaparin Injectable 40 milliGRAM(s) SubCutaneous every 24 hours  labetalol Injectable 10 milliGRAM(s) IV Push once  levETIRAcetam  IVPB 500 milliGRAM(s) IV Intermittent every 12 hours  levothyroxine Injectable 18.75 MICROGram(s) IV Push at bedtime  potassium chloride  10 mEq/100 mL IVPB 10 milliEquivalent(s) IV Intermittent every 1 hour    MEDICATIONS  (PRN):  haloperidol    Injectable 2.5 milliGRAM(s) IntraMuscular every 6 hours PRN Agitation  labetalol Injectable 10 milliGRAM(s) IV Push every 6 hours PRN Systolic blood pressure >160    Allergies    No Known Allergies    Intolerances                          14.2   18.70 )-----------( 239      ( 15 Apr 2023 05:45 )             42.8   04-15    140  |  108  |  7   ----------------------------<  140<H>  3.4<L>   |  25  |  1.00    Ca    9.6      15 Apr 2023 05:45  Phos  2.4     04-15  Mg     1.9     04-15          Hemoglobin A1C:     Vitamin B12 Vitamin B12, Serum: 692 pg/mL (04-10 @ 08:30)      RADIOLOGY    ASSESSMENT AND PLAN:      seen for ams  sp lithium toxicity  still encephalopathic ?non convulsive sz    continue  keppra  would follow up autoimmune profile  management dw critical care team and patient wife at bed side  Pain is accessed and addressed.  Would continue to follow.

## 2023-04-15 NOTE — PROGRESS NOTE ADULT - SUBJECTIVE AND OBJECTIVE BOX
Patient is a 67y old  Male who presents with a chief complaint of AMS (13 Apr 2023 12:00)      INTERVAL /OVERNIGHT EVENTS: agitated at times     Vital Signs Last 24 Hrs  T(C): 37.4 (15 Apr 2023 08:01), Max: 37.4 (15 Apr 2023 08:01)  T(F): 99.3 (15 Apr 2023 08:01), Max: 99.3 (15 Apr 2023 08:01)  HR: 90 (15 Apr 2023 12:00) (78 - 101)  BP: 118/71 (15 Apr 2023 12:00) (111/65 - 172/77)  BP(mean): 90 (15 Apr 2023 12:00) (79 - 116)  RR: 21 (15 Apr 2023 12:00) (15 - 33)  SpO2: 93% (15 Apr 2023 12:00) (90% - 99%)    Parameters below as of 14 Apr 2023 19:00  Patient On (Oxygen Delivery Method): room air        04-15    140  |  108  |  7   ----------------------------<  140<H>  3.4<L>   |  25  |  1.00    Ca    9.6      15 Apr 2023 05:45  Phos  2.4     04-15  Mg     1.9     04-15                            14.2   18.70 )-----------( 239      ( 15 Apr 2023 05:45 )             42.8     PT/INR - ( 14 Apr 2023 05:30 )   PT: 14.4 sec;   INR: 1.23 ratio           CAPILLARY BLOOD GLUCOSE                  enoxaparin Injectable 40 milliGRAM(s) SubCutaneous every 24 hours  haloperidol    Injectable 2.5 milliGRAM(s) IntraMuscular every 6 hours PRN  labetalol Injectable 10 milliGRAM(s) IV Push every 6 hours PRN  levETIRAcetam  IVPB 500 milliGRAM(s) IV Intermittent every 12 hours  levothyroxine 25 MICROGram(s) Oral daily            REVIEW OF SYSTEMS:  unable to obtain      PHYSICAL EXAM:  GENERAL: NAD  HEAD:  Atraumatic, Normocephalic  EYES: EOMI, PERRLA, conjunctiva and sclera clear  ENMT: No tonsillar erythema, exudates, or enlargement; Moist mucous membranes  NECK: Supple, No JVD  NERVOUS SYSTEM:  resting  CHEST/LUNG: Clear to auscultation bilaterally; No rales, rhonchi, wheezing,  HEART: Regular rate and rhythm  ABDOMEN: Soft, Nontender, Nondistended; Bowel sounds present  EXTREMITIES:  No clubbing, cyanosis, or edema        Advanced care planning discussed with patient/family [X] YES   [ ] NO    Advanced care planning discussed with patient/family. Patient's health status was discussed. All appropriate changes have been made regarding patient's end-of-life care. Advanced care planning forms reviewed/discussed/completed.  20 minutes spent.

## 2023-04-15 NOTE — PROGRESS NOTE ADULT - PROBLEM SELECTOR PLAN 6
Patient recently diagnosed w/ CKD3 at [facility]  - Cr. 1.9 on admission, baseline unknown  - Continue to monitor with daily BMP  - Avoid nephrotoxic agents.  - Renal follow up

## 2023-04-15 NOTE — PROGRESS NOTE ADULT - ATTENDING COMMENTS
67 year old male with history of bipolar disorder, CKD3, and hypothyroidism presented with a subacute-chronic encephalopathy for approximately  6-7 weeks, prompting second admission. Possible lithium toxicity. No infectious etiology identified. Cell count on LP not consistent with infectious encephalitis, cannot exclude autoimmune or paraneoplastic cause. Mental status improving.    -Continue Haldol PRN  -Continue Keppra as per neuro  -Follow up LP studies  -Labetalol PRN for hypertension. Patient's wife prefers to hold off starting standing medication until outpatient evaluation  -Continue dysphagia diet  -Levothyroxine for hypothyroidism  -Monitor leukocytosis. No fever. Monitor off abx.  -DVT PPX    Eligible for transfer out of ICU    Patient's wife updated
66 yo man with Hx bipolar disorder, CKD3, hypothyroidism presenting with a subacute encephalopathy, about last 4 weeks, prompting now the second admission. Possible Li toxicity though pt clinically worse despite improvement in Li levels.   No metabolic or infectious etiology identified.  DDx includes encephalitis (?autoimmune v paraneoplastic), stroke and sz less likely given time course.  Mental status appears to be slowly improving.    --continue precedex, increase prn haldol dose to 2.5IM  continue keppra  repeat EEG today shows no focal or epileptiform discharges  empiric acyclovir for HSV encephalitis pending LP results  LP today at bedside by Dr. Zambrano  --hemodynamically stable  QTc 411 on monitor  --stable from respiratory standpoint  --CKD at baseline, improving  hypernatremia, continue D5  --NPO until mental status improves  may need NGT and TF  --discussed plan in detail with wife Alejandra and son Kenyon Granger.  Alejandra now agreeable to LP  Updated Alejandra when procedure completed.
68 yo man with Hx bipolar disorder, CKD3, hypothyroidism presenting with a subacute encephalopathy, about last 4 weeks, prompting now the second admission. Possible Li toxicity though pt clinically worse despite improvement in Li levels.   No metabolic or infectious etiology identified.  Cell count on LP not consistent with infectious encepahitis, but cannot exclude autoimmune or paraneoplastic.  Mental status appears to be slowly improving.    --weaned off precedex today, continue haldol prn  continue keppra  given LP results, d/c acyclovir  --hemodynamically stable  --stable from respiratory standpoint  --CKD improving  --NPO until mental status improves  may need NGT and TF  --discussed plan in detail with wife Alejandra
68 yo man with Hx bipolar disorder, CKD3, hypothyroidism presenting with a subacute encephalopathy, about last 4 weeks, prompting now the second admission. Possible Li toxicity though pt clinically worse despite improvement in Li levels.   No metabolic or infectious etiology identified.  Low grade temp today but previously no fevers.  DDx includes encephalitis (?autoimmune v paraneoplastic), stroke and sz less likely given time course (see detailed history from wife in separate chart note).    --continue precedex and haldol prn for agitation  EEG done today  recommended LP and MRI, wife refusing both at this time (detailed in separate chart note)  --hemodynamically stable  --stable from respiratory standpoint  --CKD at baseline  hypernatremia, continue D5 1/2NS  unclear cause of elevated CPK, improved in afternoon  --NPO until mental status improves  --relatively low suspicion for infectious encephalitis given subacute time course, no evidence of other systemic infection, keep zosyn for now pending cultures but low suspicion to d/c if no signs of infection develop
68 yo man with Hx bipolar disorder, CKD3, hypothyroidism presenting with a subacute-chronic encephalopathy, about last 6-7 weeks, prompting now the second admission. Possible Li toxicity.  No metabolic or infectious etiology identified.  Cell count on LP not consistent with infectious encepahitis, but cannot exclude autoimmune or paraneoplastic.  Mental status appears to be slowly improving.    --continue haldol prn  continue keppra  f/u LP lyme, WNv, RPR, autoimmune and paraneoplastic panel  oupt MRI read obtained from PCP (see separate note), MRI performed on 3/9, so symptoms likely starting in 1st week of March  --htn, start IV labetalol  --stable from respiratory standpoint  --normal renal function  --start dysphagia diet  --leukocytosis but afebrile, monitor off Abx  --discussed plan in detail with wife Alejandra
66 yo man with Hx bipolar disorder, CKD3, hypothyroidism presenting with a subacute encephalopathy, about last 4 weeks, prompting now the second admission. Possible Li toxicity though pt clinically worse despite improvement in Li levels.   No metabolic or infectious etiology identified.  DDx includes encephalitis (?autoimmune v paraneoplastic), stroke and sz less likely given time course.    --continue precedex and haldol prn for agitation  given EEG findings start keppra  empiric acyclovir for HSV encephalitis  repeat EEG in am  --hemodynamically stable  --stable from respiratory standpoint  --CKD at baseline  hypernatremia, continue D5  --NPO until mental status improves  --monitor off Abx  --discussed with wife in detail regarding EEG findings and keppra, acyclovir  she again refuses MRI and LP after explanation of rationale/benefits of both  I encouraged her to discuss with PCP.  I asked if I could discuss with PCP but she prefers to wait until she discusses with him

## 2023-04-15 NOTE — PROGRESS NOTE ADULT - ASSESSMENT
66 y/o male PMH BPD (diagnosed 2005), CKD3, hypothyroidism Urinary retention and insomnia    Neuro: Awake and Alert this morning, Off Precedex, Answering questions appropriately, continue with 1:1 observation and Haldol prn  - Lithium level <0.2, will follow daily lithium levels till it reaches 0, to r/o lithium toxicity   - LP showed 0 RBC, 1 neutrophil--> low  suspicion of autoimmune vs paraneoplastic encephalitis   - HSV negative, acyclovir discontinued   - CSF PCR negative   - Outpatient MRI obtained showed mild chronic nonspecific white matter changes and mild general parenchymal volume loss   - EEG report from 04/12/23 shows no focal epileptiform changes   - Continue with Keppra per neuro recs   - F/u CSF cultures, VDRL, Lyme and 14-3-3    Cardio: Started on Norvasc 5 mg for BP control; ON Iv labetaolol PRN     Pulm: Maintaining O2 sat > 92 % on room air. No other active issues.     GI: Pureed diet now     Renal: CKD with Scr around baseline, will trend with lytes and replete as needed.    ID: Leukocytosis with elevated WBC today to 18.7 low suspicion for infection at this time UA negative, CXR clear, Bcx NGTD. Monitor off Abx  given LP results now lower suspicion for autoimmune vs paraneoplastic encephalitis   acyclovir discontinued, given low suspicion for HSV encephalitis   f/u CSF cultures, VDRL, Lyme, 14-3-3    Endo; TSH wnl. No other active issues at this time.     Heme: Hep Sub Q for DVT prophylaxis

## 2023-04-15 NOTE — CHART NOTE - NSCHARTNOTEFT_GEN_A_CORE
Assessment:   Pt seen for nutrition follow up.  Chart reviewed, hospital course noted.     Brief History:   "66 y/o male PMH BPD (diagnosed 2005), CKD3, hypothyroidism Urinary retention and insomnia presents to the ED for altered mental status with increased weakness and decreased po intake. Admit for AMS."    Pt seen at bedside, unable to have meaningful conversation.  1:1 present.   Puree diet started yesterday.  Per 1:1 pt ate about half bfst today.  Fecal incontinence 4/15.  Now off Precedex,  "LP performed yesterday shows 0 RBC, 1 neutrophil--> low  suspicion of autoimmune vs paraneoplastic encephalitis"       Factors impacting intake: [ ] none [ ] nausea  [ ] vomiting [ ] diarrhea [ ] constipation  [ ]chewing problems [ ] swallowing issues  [x] other:  AMS    Diet Prescription: Diet, Pureed (04-14-23 @ 17:16)    Intake: fair    Current Weight: 4/10 178.7#, 4/15 182.9#      Pertinent Medications: MEDICATIONS  (STANDING):  amLODIPine   Tablet 5 milliGRAM(s) Oral daily  enoxaparin Injectable 40 milliGRAM(s) SubCutaneous every 24 hours  levETIRAcetam  IVPB 500 milliGRAM(s) IV Intermittent every 12 hours  levothyroxine 25 MICROGram(s) Oral daily  potassium phosphate IVPB 15 milliMole(s) IV Intermittent once    MEDICATIONS  (PRN):  haloperidol    Injectable 2.5 milliGRAM(s) IntraMuscular every 6 hours PRN Agitation  labetalol Injectable 10 milliGRAM(s) IV Push every 6 hours PRN Systolic blood pressure >160    Pertinent Labs: 04-15 Na140 mmol/L Glu 140 mg/dL<H> K+ 3.4 mmol/L<L> Cr  1.00 mg/dL BUN 7 mg/dL 04-15 Phos 2.4 mg/dL<L> 04-12 Alb 3.1 g/dL<L>     CAPILLARY BLOOD GLUCOSE      Skin:  no pressure injuries  Edema:  none noted    Estimated Needs: 4/9 based on #  kcal/day:  8111-8544  gms protein/day: 67-82    Previous Nutrition Diagnosis:   [x]  Malnutrition  (severe, acute)    Nutrition Diagnosis is [x] ongoing  [ ] resolved [ ] not applicable     New Nutrition Diagnosis: [x] not applicable         Interventions:   Recommend  [x] Continue current diet  [ ] Change Diet To:  [ ] Nutrition Supplement  [ ] Nutrition Support  [x] Other: recommend swallow eval for possible diet upgrade.     Monitoring and Evaluation:   [x] PO intake [ x ] Tolerance to diet prescription [ x ] weights [ x ] labs [ x ] follow up per protocol  [x] other: s/s GI distress, bowel function, skin integrity/ edema

## 2023-04-15 NOTE — PROGRESS NOTE ADULT - SUBJECTIVE AND OBJECTIVE BOX
Interval Events:   Patient now off of Precedex since 4/14  Outpatient MRI showed mild chronic nonspecific white matter changes and mild general parenchymal volume loss   wbc increased to 18.7  K and Phosphorus repleted today   Patient seen and examined at bedside now awake and alert (AxOx2). Responds to commands.   Patient on pureed diet, medications switched to PO, off of D5 fluids     Review of Systems:  Constitutional: No fever, chills, fatigue  Neuro: No headache, numbness, weakness  Resp: No cough, wheezing, shortness of breath  CVS: No chest pain, palpitations, leg swelling  GI: No abdominal pain, nausea, vomiting, diarrhea   : No dysuria, frequency, incontinence  Skin: No itching, burning, rashes, or lesions   Msk: No joint pain or swelling  Psych: No depression, anxiety, mood swings    ICU Vital Signs Last 24 Hrs  T(C): 37.4 (15 Apr 2023 08:01), Max: 37.4 (15 Apr 2023 08:01)  T(F): 99.3 (15 Apr 2023 08:01), Max: 99.3 (15 Apr 2023 08:01)  HR: 90 (15 Apr 2023 12:00) (78 - 101)  BP: 118/71 (15 Apr 2023 12:00) (111/65 - 172/77)  BP(mean): 90 (15 Apr 2023 12:00) (79 - 116)  ABP: --  ABP(mean): --  RR: 21 (15 Apr 2023 12:00) (15 - 33)  SpO2: 93% (15 Apr 2023 12:00) (90% - 99%)    O2 Parameters below as of 14 Apr 2023 19:00  Patient On (Oxygen Delivery Method): room air              04-14-23 @ 07:01  -  04-15-23 @ 07:00  --------------------------------------------------------  IN: 2420 mL / OUT: 1750 mL / NET: 670 mL    04-15-23 @ 07:01  -  04-15-23 @ 12:55  --------------------------------------------------------  IN: 210 mL / OUT: 0 mL / NET: 210 mL        CAPILLARY BLOOD GLUCOSE          I&O's Summary    14 Apr 2023 07:01  -  15 Apr 2023 07:00  --------------------------------------------------------  IN: 2420 mL / OUT: 1750 mL / NET: 670 mL    15 Apr 2023 07:01  -  15 Apr 2023 12:55  --------------------------------------------------------  IN: 210 mL / OUT: 0 mL / NET: 210 mL        PHYSICAL EXAM:  General: Well developed male laying in bed   HEENT: PERRL, clear conjunctiva, no scleral icterus  Respiratory: CTA bilaterally, no wheezing, rhonchi, rales  Cardiovascular: RRR, normal S1S2, no M/R/G  Abdomen: soft, NT/ND, bowel sounds in all four quadrants, no palpable masses  Extremities: no clubbing, cyanosis, or edema  Neuro: awake and alert, responds appropriately to direct questioning. Holds eye contact, able to give name, know location, unable to state date     Meds:    labetalol Injectable IV Push PRN    levothyroxine Oral      haloperidol    Injectable IntraMuscular PRN  levETIRAcetam  IVPB IV Intermittent      enoxaparin Injectable SubCutaneous                                        14.2   18.70 )-----------( 239      ( 15 Apr 2023 05:45 )             42.8       04-15    140  |  108  |  7   ----------------------------<  140<H>  3.4<L>   |  25  |  1.00    Ca    9.6      15 Apr 2023 05:45  Phos  2.4     04-15  Mg     1.9     04-15            PT/INR - ( 14 Apr 2023 05:30 )   PT: 14.4 sec;   INR: 1.23 ratio             .CSF CSF   No growth   No polymorphonuclear cells seen  No organisms seen  by cytocentrifuge 04-12 @ 17:23              Radiology:    Bedside Ultrasound:    Tubes/Lines:      GLOBAL ISSUE/BEST PRACTICE:  Analgesia:Y  Sedation:N  HOB elevation: Y  Stress ulcer prophylaxis:N  VTE prophylaxis:N  Glycemic control:N  Nutrition:Y    CODE STATUS: full code

## 2023-04-16 LAB
ALBUMIN SERPL ELPH-MCNC: 3 G/DL — LOW (ref 3.3–5)
ALP SERPL-CCNC: 123 U/L — HIGH (ref 40–120)
ALT FLD-CCNC: 70 U/L — SIGNIFICANT CHANGE UP (ref 12–78)
ANION GAP SERPL CALC-SCNC: 7 MMOL/L — SIGNIFICANT CHANGE UP (ref 5–17)
AST SERPL-CCNC: 54 U/L — HIGH (ref 15–37)
BILIRUB SERPL-MCNC: 0.5 MG/DL — SIGNIFICANT CHANGE UP (ref 0.2–1.2)
BUN SERPL-MCNC: 10 MG/DL — SIGNIFICANT CHANGE UP (ref 7–23)
CALCIUM SERPL-MCNC: 9 MG/DL — SIGNIFICANT CHANGE UP (ref 8.5–10.1)
CHLORIDE SERPL-SCNC: 110 MMOL/L — HIGH (ref 96–108)
CO2 SERPL-SCNC: 25 MMOL/L — SIGNIFICANT CHANGE UP (ref 22–31)
CREAT SERPL-MCNC: 0.93 MG/DL — SIGNIFICANT CHANGE UP (ref 0.5–1.3)
EGFR: 89 ML/MIN/1.73M2 — SIGNIFICANT CHANGE UP
GLUCOSE SERPL-MCNC: 108 MG/DL — HIGH (ref 70–99)
HCT VFR BLD CALC: 38.8 % — LOW (ref 39–50)
HGB BLD-MCNC: 13.1 G/DL — SIGNIFICANT CHANGE UP (ref 13–17)
LITHIUM SERPL-MCNC: <0.2 MMOL/L — LOW (ref 0.6–1.2)
MCHC RBC-ENTMCNC: 30.6 PG — SIGNIFICANT CHANGE UP (ref 27–34)
MCHC RBC-ENTMCNC: 33.8 GM/DL — SIGNIFICANT CHANGE UP (ref 32–36)
MCV RBC AUTO: 90.7 FL — SIGNIFICANT CHANGE UP (ref 80–100)
NRBC # BLD: 0 /100 WBCS — SIGNIFICANT CHANGE UP (ref 0–0)
PLATELET # BLD AUTO: 243 K/UL — SIGNIFICANT CHANGE UP (ref 150–400)
POTASSIUM SERPL-MCNC: 3.3 MMOL/L — LOW (ref 3.5–5.3)
POTASSIUM SERPL-SCNC: 3.3 MMOL/L — LOW (ref 3.5–5.3)
PROT SERPL-MCNC: 6.7 G/DL — SIGNIFICANT CHANGE UP (ref 6–8.3)
RBC # BLD: 4.28 M/UL — SIGNIFICANT CHANGE UP (ref 4.2–5.8)
RBC # FLD: 13.1 % — SIGNIFICANT CHANGE UP (ref 10.3–14.5)
SODIUM SERPL-SCNC: 142 MMOL/L — SIGNIFICANT CHANGE UP (ref 135–145)
WBC # BLD: 14.1 K/UL — HIGH (ref 3.8–10.5)
WBC # FLD AUTO: 14.1 K/UL — HIGH (ref 3.8–10.5)

## 2023-04-16 RX ORDER — POTASSIUM CHLORIDE 20 MEQ
40 PACKET (EA) ORAL EVERY 4 HOURS
Refills: 0 | Status: COMPLETED | OUTPATIENT
Start: 2023-04-16 | End: 2023-04-16

## 2023-04-16 RX ADMIN — HALOPERIDOL DECANOATE 2.5 MILLIGRAM(S): 100 INJECTION INTRAMUSCULAR at 09:17

## 2023-04-16 RX ADMIN — HALOPERIDOL DECANOATE 2.5 MILLIGRAM(S): 100 INJECTION INTRAMUSCULAR at 18:28

## 2023-04-16 RX ADMIN — Medication 40 MILLIEQUIVALENT(S): at 14:41

## 2023-04-16 RX ADMIN — Medication 25 MICROGRAM(S): at 05:10

## 2023-04-16 RX ADMIN — LEVETIRACETAM 400 MILLIGRAM(S): 250 TABLET, FILM COATED ORAL at 05:10

## 2023-04-16 RX ADMIN — LEVETIRACETAM 400 MILLIGRAM(S): 250 TABLET, FILM COATED ORAL at 17:51

## 2023-04-16 RX ADMIN — Medication 40 MILLIEQUIVALENT(S): at 17:52

## 2023-04-16 NOTE — PROGRESS NOTE ADULT - PROBLEM SELECTOR PLAN 1
AMS in the setting of progressive dementia vs. medications vs. electrolyte imbalance  - Abnormal EEG of unclear significance -- started on empiric Keppra   - Haldol prn  - S/P LP no evidence of encephalitis  - ID consult with dr. woody  - F/u UCx, BCx x2 -- NTD  - Neuro follow up  - Psych (Dr. Floyd) consulted, f/u recs

## 2023-04-16 NOTE — PROGRESS NOTE ADULT - SUBJECTIVE AND OBJECTIVE BOX
Patient is a 67y old  Male who presents with a chief complaint of AMS (13 Apr 2023 12:00)      INTERVAL /OVERNIGHT EVENTS: agitated at times     Vital Signs Last 24 Hrs  T(C): 36.9 (16 Apr 2023 04:41), Max: 37.1 (15 Apr 2023 18:00)  T(F): 98.5 (16 Apr 2023 04:41), Max: 98.7 (15 Apr 2023 18:00)  HR: 82 (16 Apr 2023 04:41) (77 - 123)  BP: 147/88 (16 Apr 2023 04:41) (119/63 - 174/88)  BP(mean): 97 (15 Apr 2023 17:10) (86 - 123)  RR: 19 (16 Apr 2023 04:41) (14 - 30)  SpO2: 96% (16 Apr 2023 04:41) (95% - 98%)    Parameters below as of 16 Apr 2023 04:41  Patient On (Oxygen Delivery Method): room air        04-16    142  |  110<H>  |  10  ----------------------------<  108<H>  3.3<L>   |  25  |  0.93    Ca    9.0      16 Apr 2023 05:45  Phos  2.4     04-15  Mg     1.9     04-15    TPro  6.7  /  Alb  3.0<L>  /  TBili  0.5  /  DBili  x   /  AST  54<H>  /  ALT  70  /  AlkPhos  123<H>  04-16                          13.1   14.10 )-----------( 243      ( 16 Apr 2023 05:45 )             38.8       CAPILLARY BLOOD GLUCOSE                  enoxaparin Injectable 40 milliGRAM(s) SubCutaneous every 24 hours  haloperidol    Injectable 2.5 milliGRAM(s) IntraMuscular every 6 hours PRN  labetalol Injectable 10 milliGRAM(s) IV Push every 6 hours PRN  levETIRAcetam  IVPB 500 milliGRAM(s) IV Intermittent every 12 hours  levothyroxine 25 MICROGram(s) Oral daily  potassium chloride    Tablet ER 40 milliEquivalent(s) Oral every 4 hours            REVIEW OF SYSTEMS:  unable to obtain      PHYSICAL EXAM:  GENERAL: NAD  HEAD:  Atraumatic, Normocephalic  EYES: EOMI, PERRLA, conjunctiva and sclera clear  ENMT: No tonsillar erythema, exudates, or enlargement; Moist mucous membranes  NECK: Supple, No JVD  NERVOUS SYSTEM:  resting  CHEST/LUNG: Clear to auscultation bilaterally; No rales, rhonchi, wheezing,  HEART: Regular rate and rhythm  ABDOMEN: Soft, Nontender, Nondistended; Bowel sounds present  EXTREMITIES:  No clubbing, cyanosis, or edema        Advanced care planning discussed with patient/family [X] YES   [ ] NO    Advanced care planning discussed with patient/family. Patient's health status was discussed. All appropriate changes have been made regarding patient's end-of-life care. Advanced care planning forms reviewed/discussed/completed.  20 minutes spent.

## 2023-04-16 NOTE — PROGRESS NOTE ADULT - SUBJECTIVE AND OBJECTIVE BOX
Neurology Follow up note(covering dr ochoa)    MAXI [id number]    HPI:  68 y/o male PMH BPD (diagnosed 2005), CKD3, hypothyroidism Urinary retention and insomnia presents to the ED for altered mental status with increased weakness and decreased po intake. Patient unable to answer questions secondary to mental status. History obtained from patient's wife and son via telephone. Patient was recently admitted to [facility] for a manic episode ~3 weeks ago. There his dose of Bupropion was decreased 150mg TID to once daily, he also had a normal CT head and was diagnosed with CKD3.  Soon after discharge, patient began to have increased weakness, unsteady gait with multiple mechanical falls, slurred speech, and decreased po intake. These symptoms seemed to have a rapid onset ~10 days and have not improved. His son also notes, he seemed to have pain with swallowing as well.   Of note, patient's mom also had hx of Alzheimer's dementia and parkinson's disease which she was diagnosed with in her 70s and family is concerned he might be developing a neurological disorder.   At time of exam, patient unable to answer questions or follow commands.     ED Course  T: 96.6F HR 98 /83 RR 20 SpO2 98% on RA  Labs:  wbc 12.30 PT/INR 13.9/1.19 BUN/Cr 24/1.90  UA: negative  CXR: grossly clear (f/u official read)  EKG: NSR HR 80  CT Head: Grossly, no acute hemorrhage mass or mass effect seen. Better  CT is recommended when patient can tolerate it or sedation be given.    In the ED, pt given NS 1L bolus x1, Ativan 1mg IVPx1   (08 Apr 2023 13:21)      Interval History -no new events    Patient is seen, chart was reviewed and case was discussed with the treatment team.  Pt is not in any distress.   Lying on bed comfortably.       r    Vital Signs Last 24 Hrs  T(C): 36.6 (16 Apr 2023 12:46), Max: 37.1 (15 Apr 2023 18:00)  T(F): 97.8 (16 Apr 2023 12:46), Max: 98.7 (15 Apr 2023 18:00)  HR: 75 (16 Apr 2023 12:46) (75 - 123)  BP: 158/77 (16 Apr 2023 12:46) (124/64 - 174/88)  BP(mean): 97 (15 Apr 2023 17:10) (87 - 123)  RR: 17 (16 Apr 2023 12:46) (14 - 30)  SpO2: 98% (16 Apr 2023 12:46) (95% - 98%)    Parameters below as of 16 Apr 2023 12:46  Patient On (Oxygen Delivery Method): room air                  r            REVIEW OF SYSTEMS:      limited due to poor mentation  not in any distress/pain  On Neurological Examination:    Mental Status - Pt is awake following simple commands      Speech -  Pt has dysarthria.    Cranial Nerves - Pupils 3 mm equal and reactive to light, extraocular eye movements intact. Pt has no visual field deficit.  Pt has no  facial asymmetry. Facial sensation is intact.Tongue - is in midline.    Muscle tone - is normal      Motor Exam - 4/5 all over, No drift. No shaking or tremors.    Sensory Exam - Pt withdraws all extremities equally on stimulation. No asymmetry seen. No complaints of tingling, numbness.    coordination:    Finger to nose: normal    Deep tendon Reflexes - 2 plus all over.          Neck Supple -  Yes.     MEDICATIONS  (STANDING):  dextrose 5%. 1000 milliLiter(s) (80 mL/Hr) IV Continuous <Continuous>  enoxaparin Injectable 40 milliGRAM(s) SubCutaneous every 24 hours  labetalol Injectable 10 milliGRAM(s) IV Push once  levETIRAcetam  IVPB 500 milliGRAM(s) IV Intermittent every 12 hours  levothyroxine Injectable 18.75 MICROGram(s) IV Push at bedtime  potassium chloride  10 mEq/100 mL IVPB 10 milliEquivalent(s) IV Intermittent every 1 hour    MEDICATIONS  (PRN):  haloperidol    Injectable 2.5 milliGRAM(s) IntraMuscular every 6 hours PRN Agitation  labetalol Injectable 10 milliGRAM(s) IV Push every 6 hours PRN Systolic blood pressure >160    Allergies    No Known Allergies    Intolerances                                  13.1   14.10 )-----------( 243      ( 16 Apr 2023 05:45 )             38.8       Hemoglobin A1C:     Vitamin B12 Vitamin B12, Serum: 692 pg/mL (04-10 @ 08:30)      RADIOLOGY    ASSESSMENT AND PLAN:      seen for ams  sp lithium toxicity  still encephalopathic ?non convulsive sz    continue  keppra  would follow up autoimmune profile  management dw critical care team and patient wife at bed side  Pain is accessed and addressed.  Would continue to follow.

## 2023-04-16 NOTE — PROGRESS NOTE ADULT - PROBLEM SELECTOR PLAN 6
Patient recently diagnosed w/ CKD3 at [facility]  - Cr. 1.9 on admission, baseline unknown  - Continue to monitor with daily BMP  - Avoid nephrotoxic agents.  - Renal follow up

## 2023-04-16 NOTE — PROGRESS NOTE ADULT - PROBLEM SELECTOR PLAN 2
Chronic, diagnosed 2005.  - Recent admission to [facility] for manic episode  - Hold lithium 2/2 litihium toxicity  - Psych (Dr. Floyd) consulted, appreciate recs

## 2023-04-17 ENCOUNTER — APPOINTMENT (OUTPATIENT)
Dept: INTERNAL MEDICINE | Facility: CLINIC | Age: 68
End: 2023-04-17

## 2023-04-17 LAB
ANION GAP SERPL CALC-SCNC: 6 MMOL/L — SIGNIFICANT CHANGE UP (ref 5–17)
BUN SERPL-MCNC: 13 MG/DL — SIGNIFICANT CHANGE UP (ref 7–23)
CALCIUM SERPL-MCNC: 9.2 MG/DL — SIGNIFICANT CHANGE UP (ref 8.5–10.1)
CHLORIDE SERPL-SCNC: 110 MMOL/L — HIGH (ref 96–108)
CO2 SERPL-SCNC: 25 MMOL/L — SIGNIFICANT CHANGE UP (ref 22–31)
CREAT SERPL-MCNC: 1 MG/DL — SIGNIFICANT CHANGE UP (ref 0.5–1.3)
EGFR: 81 ML/MIN/1.73M2 — SIGNIFICANT CHANGE UP
GLUCOSE SERPL-MCNC: 95 MG/DL — SIGNIFICANT CHANGE UP (ref 70–99)
HCT VFR BLD CALC: 40.4 % — SIGNIFICANT CHANGE UP (ref 39–50)
HGB BLD-MCNC: 13.4 G/DL — SIGNIFICANT CHANGE UP (ref 13–17)
MAGNESIUM SERPL-MCNC: 2.1 MG/DL — SIGNIFICANT CHANGE UP (ref 1.6–2.6)
MCHC RBC-ENTMCNC: 30.5 PG — SIGNIFICANT CHANGE UP (ref 27–34)
MCHC RBC-ENTMCNC: 33.2 GM/DL — SIGNIFICANT CHANGE UP (ref 32–36)
MCV RBC AUTO: 92 FL — SIGNIFICANT CHANGE UP (ref 80–100)
NRBC # BLD: 0 /100 WBCS — SIGNIFICANT CHANGE UP (ref 0–0)
PLATELET # BLD AUTO: 279 K/UL — SIGNIFICANT CHANGE UP (ref 150–400)
POTASSIUM SERPL-MCNC: 3.4 MMOL/L — LOW (ref 3.5–5.3)
POTASSIUM SERPL-SCNC: 3.4 MMOL/L — LOW (ref 3.5–5.3)
RBC # BLD: 4.39 M/UL — SIGNIFICANT CHANGE UP (ref 4.2–5.8)
RBC # FLD: 13.1 % — SIGNIFICANT CHANGE UP (ref 10.3–14.5)
SODIUM SERPL-SCNC: 141 MMOL/L — SIGNIFICANT CHANGE UP (ref 135–145)
VDRL CSF-TITR: SIGNIFICANT CHANGE UP
WBC # BLD: 12.95 K/UL — HIGH (ref 3.8–10.5)
WBC # FLD AUTO: 12.95 K/UL — HIGH (ref 3.8–10.5)
WNV IGG CSF IA-ACNC: NEGATIVE — SIGNIFICANT CHANGE UP
WNV IGM CSF IA-ACNC: NEGATIVE — SIGNIFICANT CHANGE UP

## 2023-04-17 PROCEDURE — 99231 SBSQ HOSP IP/OBS SF/LOW 25: CPT

## 2023-04-17 PROCEDURE — 99232 SBSQ HOSP IP/OBS MODERATE 35: CPT

## 2023-04-17 PROCEDURE — 93010 ELECTROCARDIOGRAM REPORT: CPT

## 2023-04-17 RX ORDER — POTASSIUM CHLORIDE 20 MEQ
10 PACKET (EA) ORAL
Refills: 0 | Status: COMPLETED | OUTPATIENT
Start: 2023-04-17 | End: 2023-04-17

## 2023-04-17 RX ORDER — OLANZAPINE 15 MG/1
2.5 TABLET, FILM COATED ORAL ONCE
Refills: 0 | Status: COMPLETED | OUTPATIENT
Start: 2023-04-17 | End: 2023-04-17

## 2023-04-17 RX ORDER — LANOLIN ALCOHOL/MO/W.PET/CERES
5 CREAM (GRAM) TOPICAL ONCE
Refills: 0 | Status: DISCONTINUED | OUTPATIENT
Start: 2023-04-17 | End: 2023-04-17

## 2023-04-17 RX ADMIN — ENOXAPARIN SODIUM 40 MILLIGRAM(S): 100 INJECTION SUBCUTANEOUS at 00:02

## 2023-04-17 RX ADMIN — Medication 100 MILLIEQUIVALENT(S): at 19:06

## 2023-04-17 RX ADMIN — LEVETIRACETAM 400 MILLIGRAM(S): 250 TABLET, FILM COATED ORAL at 18:01

## 2023-04-17 RX ADMIN — LEVETIRACETAM 400 MILLIGRAM(S): 250 TABLET, FILM COATED ORAL at 05:02

## 2023-04-17 RX ADMIN — Medication 100 MILLIEQUIVALENT(S): at 17:56

## 2023-04-17 RX ADMIN — HALOPERIDOL DECANOATE 2.5 MILLIGRAM(S): 100 INJECTION INTRAMUSCULAR at 03:06

## 2023-04-17 RX ADMIN — HALOPERIDOL DECANOATE 2.5 MILLIGRAM(S): 100 INJECTION INTRAMUSCULAR at 20:36

## 2023-04-17 RX ADMIN — Medication 25 MICROGRAM(S): at 05:02

## 2023-04-17 RX ADMIN — Medication 100 MILLIEQUIVALENT(S): at 20:37

## 2023-04-17 RX ADMIN — Medication 1 MILLIGRAM(S): at 04:59

## 2023-04-17 RX ADMIN — HALOPERIDOL DECANOATE 2.5 MILLIGRAM(S): 100 INJECTION INTRAMUSCULAR at 11:35

## 2023-04-17 RX ADMIN — OLANZAPINE 2.5 MILLIGRAM(S): 15 TABLET, FILM COATED ORAL at 03:41

## 2023-04-17 NOTE — PROGRESS NOTE ADULT - PROBLEM SELECTOR PLAN 2
Chronic, diagnosed 2005.  - Recent admission to [facility] for manic episode  - Hold lithium 2/2 litihium toxicity  - Psych (Dr. Floyd) consulted, appreciate recs

## 2023-04-17 NOTE — PROGRESS NOTE ADULT - PROBLEM SELECTOR PLAN 6
Patient recently diagnosed w/ CKD3 at [facility]  - Cr. 1.9 on admission, baseline unknown  - Continue to monitor with daily BMP  - Avoid nephrotoxic agents.  - Renal follow up

## 2023-04-17 NOTE — PROGRESS NOTE ADULT - SUBJECTIVE AND OBJECTIVE BOX
Neurology Follow up note(covering dr ochoa)    MAXI [id number]    HPI:  68 y/o male PMH BPD (diagnosed 2005), CKD3, hypothyroidism Urinary retention and insomnia presents to the ED for altered mental status with increased weakness and decreased po intake. Patient unable to answer questions secondary to mental status. History obtained from patient's wife and son via telephone. Patient was recently admitted to [facility] for a manic episode ~3 weeks ago. There his dose of Bupropion was decreased 150mg TID to once daily, he also had a normal CT head and was diagnosed with CKD3.  Soon after discharge, patient began to have increased weakness, unsteady gait with multiple mechanical falls, slurred speech, and decreased po intake. These symptoms seemed to have a rapid onset ~10 days and have not improved. His son also notes, he seemed to have pain with swallowing as well.   Of note, patient's mom also had hx of Alzheimer's dementia and parkinson's disease which she was diagnosed with in her 70s and family is concerned he might be developing a neurological disorder.   At time of exam, patient unable to answer questions or follow commands.     ED Course  T: 96.6F HR 98 /83 RR 20 SpO2 98% on RA  Labs:  wbc 12.30 PT/INR 13.9/1.19 BUN/Cr 24/1.90  UA: negative  CXR: grossly clear (f/u official read)  EKG: NSR HR 80  CT Head: Grossly, no acute hemorrhage mass or mass effect seen. Better  CT is recommended when patient can tolerate it or sedation be given.    In the ED, pt given NS 1L bolus x1, Ativan 1mg IVPx1   (08 Apr 2023 13:21)      Interval History -restless at times    Patient is seen, chart was reviewed and case was discussed with the treatment team.  Pt is not in any distress.   Lying on bed comfortably.       r    Vital Signs Last 24 Hrs  T(C): 36.5 (17 Apr 2023 07:51), Max: 37.1 (17 Apr 2023 04:23)  T(F): 97.7 (17 Apr 2023 07:51), Max: 98.8 (17 Apr 2023 04:23)  HR: 91 (17 Apr 2023 13:44) (78 - 108)  BP: 165/74 (17 Apr 2023 13:44) (142/72 - 170/86)  BP(mean): --  RR: 12 (17 Apr 2023 07:51) (12 - 19)  SpO2: 96% (17 Apr 2023 07:51) (94% - 96%)    Parameters below as of 17 Apr 2023 07:51  Patient On (Oxygen Delivery Method): room air                      REVIEW OF SYSTEMS:      limited due to poor mentation  not in any distress/pain  On Neurological Examination:    Mental Status - Pt is awake following simple commands      Speech -  Pt has dysarthria.    Cranial Nerves - Pupils 3 mm equal and reactive to light, extraocular eye movements intact. Pt has no visual field deficit.  Pt has no  facial asymmetry. Facial sensation is intact.Tongue - is in midline.    Muscle tone - is normal      Motor Exam - 4/5 all over, No drift. No shaking or tremors.    Sensory Exam - Pt withdraws all extremities equally on stimulation. No asymmetry seen. No complaints of tingling, numbness.    coordination:    Finger to nose: normal    Deep tendon Reflexes - 2 plus all over.          Neck Supple -  Yes.     MEDICATIONS  (STANDING):  enoxaparin Injectable 40 milliGRAM(s) SubCutaneous every 24 hours  levETIRAcetam  IVPB 500 milliGRAM(s) IV Intermittent every 12 hours  levothyroxine 25 MICROGram(s) Oral daily    MEDICATIONS  (PRN):  haloperidol    Injectable 2.5 milliGRAM(s) IntraMuscular every 6 hours PRN Agitation  labetalol Injectable 10 milliGRAM(s) IV Push every 6 hours PRN Systolic blood pressure >160      Allergies    No Known Allergies    Intolerances    04-17    141  |  110<H>  |  13  ----------------------------<  95  3.4<L>   |  25  |  1.00    Ca    9.2      17 Apr 2023 07:52  Mg     2.1     04-17    TPro  6.7  /  Alb  3.0<L>  /  TBili  0.5  /  DBili  x   /  AST  54<H>  /  ALT  70  /  AlkPhos  123<H>  04-16      Hemoglobin A1C:     Vitamin B12 Vitamin B12, Serum: 692 pg/mL (04-10 @ 08:30)      RADIOLOGY    ASSESSMENT AND PLAN:      seen for ams  sp lithium toxicity  still encephalopathic ?non convulsive sz    continue  keppra  would follow up autoimmune profile  management dw critical care team and patient wife at bed side  Pain is accessed and addressed.  Would continue to follow.

## 2023-04-17 NOTE — PROGRESS NOTE ADULT - SUBJECTIVE AND OBJECTIVE BOX
[facility]  INFECTIOUS DISEASES - [address], [place] [address]  Tel: [phone]     Fax: [phone]  =======================================================    MAXI FANG [id number]    Follow up: No fevers. Seen earlier today. Appears a little restless but not in distress    Allergies:  No Known Allergies      Antibiotics:  enoxaparin Injectable 40 milliGRAM(s) SubCutaneous every 24 hours  haloperidol    Injectable 2.5 milliGRAM(s) IntraMuscular every 6 hours PRN  labetalol Injectable 10 milliGRAM(s) IV Push every 6 hours PRN  levETIRAcetam 500 milliGRAM(s) Oral two times a day  levothyroxine 25 MICROGram(s) Oral daily       REVIEW OF SYSTEMS:  unable to obtain 2/2 mental status     Physical Exam:  ICU Vital Signs Last 24 Hrs  T(C): 36.7 (18 Apr 2023 06:00), Max: 36.7 (17 Apr 2023 21:15)  T(F): 98.1 (18 Apr 2023 06:00), Max: 98.1 (17 Apr 2023 21:15)  HR: 78 (18 Apr 2023 06:00) (78 - 115)  BP: 162/92 (18 Apr 2023 06:00) (151/85 - 173/70)  BP(mean): --  ABP: --  ABP(mean): --  RR: 96 (18 Apr 2023 06:00) (12 - 96)  SpO2: 95% (17 Apr 2023 21:15) (95% - 97%)    O2 Parameters below as of 18 Apr 2023 06:00  Patient On (Oxygen Delivery Method): room air      GEN: awake, not in apparent distress  HEENT: normocephalic and atraumatic.  NECK: Supple.   LUNGS: Clear to auscultation.  HEART: Regular rate and rhythm   ABDOMEN: Soft, nontender, and nondistended.    EXTREMITIES: No leg edema.  NEUROLOGIC: unable to assess, mumbling words but difficult to understand       Labs:  04-17    141  |  110<H>  |  13  ----------------------------<  95  3.4<L>   |  25  |  1.00    Ca    9.2      17 Apr 2023 07:52  Mg     2.1     04-17                            13.4   12.95 )-----------( 279      ( 17 Apr 2023 07:52 )             40.4             RECENT CULTURES:  04-12 @ 17:23 .CSF CSF     No growth    No polymorphonuclear cells seen  No organisms seen  by cytocentrifuge      04-10 @ 03:48 .Blood Blood-Peripheral     No Growth Final        04-10 @ 03:45 .Blood Blood-Peripheral     No Growth Final        04-08 @ 10:40 Clean Catch Clean Catch (Midstream)     No growth        04-08 @ 07:00 .Blood Blood-Peripheral     No Growth Final              All imaging and data are reviewed.

## 2023-04-17 NOTE — PROVIDER CONTACT NOTE (OTHER) - SITUATION
Pt agitated. Attempting to climb out of bed. Throwing legs over side rails. Pt on b/l wrist restraints. Pt at risk for fall and harm. PRN haldol administered

## 2023-04-17 NOTE — PROGRESS NOTE ADULT - SUBJECTIVE AND OBJECTIVE BOX
Patient is a 67y old  Male who presents with a chief complaint of AMS (17 Apr 2023 14:50)      INTERVAL /OVERNIGHT EVENTS: still confused, but better    MEDICATIONS  (STANDING):  enoxaparin Injectable 40 milliGRAM(s) SubCutaneous every 24 hours  levETIRAcetam  IVPB 500 milliGRAM(s) IV Intermittent every 12 hours  levothyroxine 25 MICROGram(s) Oral daily  potassium chloride  10 mEq/100 mL IVPB 10 milliEquivalent(s) IV Intermittent every 1 hour    MEDICATIONS  (PRN):  haloperidol    Injectable 2.5 milliGRAM(s) IntraMuscular every 6 hours PRN Agitation  labetalol Injectable 10 milliGRAM(s) IV Push every 6 hours PRN Systolic blood pressure >160      Allergies    No Known Allergies    Intolerances        REVIEW OF SYSTEMS:  unable to obtain    Vital Signs Last 24 Hrs  T(C): 36.5 (17 Apr 2023 07:51), Max: 37.1 (17 Apr 2023 04:23)  T(F): 97.7 (17 Apr 2023 07:51), Max: 98.8 (17 Apr 2023 04:23)  HR: 94 (17 Apr 2023 18:32) (78 - 108)  BP: 162/78 (17 Apr 2023 18:40) (142/72 - 170/86)  BP(mean): --  RR: 12 (17 Apr 2023 07:51) (12 - 19)  SpO2: 97% (17 Apr 2023 18:32) (94% - 97%)    Parameters below as of 17 Apr 2023 18:32  Patient On (Oxygen Delivery Method): room air        PHYSICAL EXAM:  GENERAL: NAD, well-groomed, well-developed  HEAD:  Atraumatic, Normocephalic  EYES: EOMI, PERRLA, conjunctiva and sclera clear  ENMT: No tonsillar erythema, exudates, or enlargement; Moist mucous membranes, Good dentition, No lesions  NECK: Supple, No JVD, Normal thyroid  NERVOUS SYSTEM:  Arousable; Motor Strength 5/5 B/L upper and lower extremities; DTRs 2+ intact and symmetric  CHEST/LUNG: Clear to auscultation bilaterally; No rales, rhonchi, wheezing, or rubs  HEART: Regular rate and rhythm; No murmurs, rubs, or gallops  ABDOMEN: Soft, Nontender, Nondistended; Bowel sounds present  EXTREMITIES:  2+ Peripheral Pulses, No clubbing, cyanosis, or edema  LYMPH: No lymphadenopathy noted  SKIN: No rashes or lesions    LABS:                        13.4   12.95 )-----------( 279      ( 17 Apr 2023 07:52 )             40.4     17 Apr 2023 07:52    141    |  110    |  13     ----------------------------<  95     3.4     |  25     |  1.00     Ca    9.2        17 Apr 2023 07:52  Mg     2.1       17 Apr 2023 07:52          CAPILLARY BLOOD GLUCOSE          RADIOLOGY & ADDITIONAL TESTS:    Notes Reviewed:  [x ] YES  [ ] NO    Care Discussed with Consultants/Other Providers [x ] YES  [ ] NO

## 2023-04-17 NOTE — PROGRESS NOTE ADULT - ASSESSMENT
68 y/o male PMH Bipolar disease (diagnosed 2005), CKD3, hypothyroidism, Urinary retention, insomnia, who presented with altered mental status with increased weakness and decreased po intake. Changes in mental status started about 4 weeks ago per wife. Unclear etiology of AMS. Concern for lithium toxicity vs autoimmune vs paraneoplastic encephalopathy vs seizures. Lithium levels now wnl.    LP done 4/12 to evaluate for infectious encephalitis (CSF cell count 1, glucose 79, protein 59). CSF PCR negative and bacterial culture no growth. CSF VDRL negative. He has no fever and leukocytosis improving, appears stable off antibiotics. ESR/CRP unrevealing Multiple blood cultures and urine cultures no growth.     -follow up CSF Lyme, protein 14-3-3  -monitor WBC    Jenna Powell MD  Division of Infectious Diseases   Cell [phone] between 8am and 6pm   After 6pm and weekends please call ID service at [phone].

## 2023-04-17 NOTE — PROGRESS NOTE ADULT - PROBLEM SELECTOR PLAN 1
AMS in the setting of progressive dementia vs. medications vs. electrolyte imbalance  - Abnormal EEG of unclear significance -- started on empiric Keppra   - Haldol prn  - S/P LP no evidence of encephalitis  - ID consult with dr. woody appreciated  - F/u UCx, BCx x2 -- NTD  - Neuro follow up  - Psych (Dr. Floyd) consulted, f/u recs

## 2023-04-18 LAB
ALBUMIN SERPL ELPH-MCNC: 3 G/DL — LOW (ref 3.3–5)
ALP SERPL-CCNC: 126 U/L — HIGH (ref 40–120)
ALT FLD-CCNC: 64 U/L — SIGNIFICANT CHANGE UP (ref 12–78)
ANION GAP SERPL CALC-SCNC: 7 MMOL/L — SIGNIFICANT CHANGE UP (ref 5–17)
AST SERPL-CCNC: 63 U/L — HIGH (ref 15–37)
B BURGDOR AB CSF-ACNC: SIGNIFICANT CHANGE UP
BILIRUB SERPL-MCNC: 0.6 MG/DL — SIGNIFICANT CHANGE UP (ref 0.2–1.2)
BUN SERPL-MCNC: 12 MG/DL — SIGNIFICANT CHANGE UP (ref 7–23)
CALCIUM SERPL-MCNC: 9.3 MG/DL — SIGNIFICANT CHANGE UP (ref 8.5–10.1)
CHLORIDE SERPL-SCNC: 112 MMOL/L — HIGH (ref 96–108)
CO2 SERPL-SCNC: 21 MMOL/L — LOW (ref 22–31)
CREAT SERPL-MCNC: 0.85 MG/DL — SIGNIFICANT CHANGE UP (ref 0.5–1.3)
EGFR: 94 ML/MIN/1.73M2 — SIGNIFICANT CHANGE UP
GLUCOSE SERPL-MCNC: 90 MG/DL — SIGNIFICANT CHANGE UP (ref 70–99)
HCT VFR BLD CALC: 39 % — SIGNIFICANT CHANGE UP (ref 39–50)
HGB BLD-MCNC: 12.9 G/DL — LOW (ref 13–17)
MCHC RBC-ENTMCNC: 29.9 PG — SIGNIFICANT CHANGE UP (ref 27–34)
MCHC RBC-ENTMCNC: 33.1 GM/DL — SIGNIFICANT CHANGE UP (ref 32–36)
MCV RBC AUTO: 90.5 FL — SIGNIFICANT CHANGE UP (ref 80–100)
NRBC # BLD: 0 /100 WBCS — SIGNIFICANT CHANGE UP (ref 0–0)
PLATELET # BLD AUTO: 276 K/UL — SIGNIFICANT CHANGE UP (ref 150–400)
POTASSIUM SERPL-MCNC: 3.8 MMOL/L — SIGNIFICANT CHANGE UP (ref 3.5–5.3)
POTASSIUM SERPL-SCNC: 3.8 MMOL/L — SIGNIFICANT CHANGE UP (ref 3.5–5.3)
PROT SERPL-MCNC: 6.6 G/DL — SIGNIFICANT CHANGE UP (ref 6–8.3)
RBC # BLD: 4.31 M/UL — SIGNIFICANT CHANGE UP (ref 4.2–5.8)
RBC # FLD: 13.1 % — SIGNIFICANT CHANGE UP (ref 10.3–14.5)
RHEUMATOID FACT SERPL-ACNC: <10 IU/ML — SIGNIFICANT CHANGE UP (ref 0–13)
SODIUM SERPL-SCNC: 140 MMOL/L — SIGNIFICANT CHANGE UP (ref 135–145)
WBC # BLD: 9.43 K/UL — SIGNIFICANT CHANGE UP (ref 3.8–10.5)
WBC # FLD AUTO: 9.43 K/UL — SIGNIFICANT CHANGE UP (ref 3.8–10.5)

## 2023-04-18 RX ORDER — LEVETIRACETAM 250 MG/1
500 TABLET, FILM COATED ORAL
Refills: 0 | Status: DISCONTINUED | OUTPATIENT
Start: 2023-04-18 | End: 2023-04-22

## 2023-04-18 RX ADMIN — ENOXAPARIN SODIUM 40 MILLIGRAM(S): 100 INJECTION SUBCUTANEOUS at 23:15

## 2023-04-18 RX ADMIN — HALOPERIDOL DECANOATE 2.5 MILLIGRAM(S): 100 INJECTION INTRAMUSCULAR at 10:22

## 2023-04-18 RX ADMIN — ENOXAPARIN SODIUM 40 MILLIGRAM(S): 100 INJECTION SUBCUTANEOUS at 06:12

## 2023-04-18 RX ADMIN — LEVETIRACETAM 500 MILLIGRAM(S): 250 TABLET, FILM COATED ORAL at 17:57

## 2023-04-18 NOTE — PROGRESS NOTE ADULT - PROBLEM SELECTOR PLAN 2
Chronic, diagnosed 2005.  - Recent admission to [facility] for manic episode  - Hold lithium 2/2 litihium toxicity  - Psych (Dr. Floyd) consulted, appreciate recs

## 2023-04-18 NOTE — PROGRESS NOTE ADULT - PROBLEM SELECTOR PLAN 6
Patient recently diagnosed w/ CKD3 at [facility]  - Cr. 1.9 on admission, baseline unknown  - Continue to monitor with daily BMP  - Avoid nephrotoxic agents.  - Renal follow up

## 2023-04-18 NOTE — CONSULT NOTE ADULT - ASSESSMENT
68 y/o male PMH BPD (diagnosed 2005), CKD3, hypothyroidism Urinary retention and insomnia presented to the ED for altered mental status with increased weakness and decreased po intake 4/8/23. Recent stay at [facility] and soon after discharge, patient began to have increased weakness, unsteady gait with multiple mechanical falls, slurred speech, and decreased po intake. These symptoms seemed to have a rapid onset ~10 days and have not improved.   Seen by Dr Powell and impression was that there was unclear etiology of AMS. Concern for lithium toxicity vs autoimmune vs paraneoplastic encephalopathy vs seizures. Lithium levels now wnl.  LP done 4/12 to evaluate for infectious encephalitis (CSF cell count 1, glucose 79, protein 59). CSF PCR negative and bacterial culture no growth. CSF VDRL negative. He has no fever and leukocytosis improving, appeared stable off antibiotics. ESR/CRP unrevealing Multiple blood cultures and urine cultures no growth.   Wife w some concerns regarding impact of  impact of COVID and prior vaccination on current symptoms.    RECOMMENDATIONS  Reviewed extensive and very thorough evaluation by Dr Powell and noted very elevated anti-spike ab levels of >250 and relatively unremarkable other investigations. Did note nl ESR with elevated CRP, isolated fevers on 4/10, fluctuating leukocytosis. Will discuss with wife and have already discussed with Dr Powell that nothing here is compelling for a connection between prior COVID or vaccination.    Thank you for consulting us and involving us in the management of this most interesting and challenging case.  Please call us at [phone] or text me directly on my cell#[phone] with any concerns or further questions but will defer to the ongoing excellent management of Dr Powell.   66 y/o male PMH BPD (diagnosed 2005), CKD3, hypothyroidism Urinary retention and insomnia presented to the ED for altered mental status with increased weakness and decreased po intake 4/8/23. Recent stay at [facility] and soon after discharge, patient began to have increased weakness, unsteady gait with multiple mechanical falls, slurred speech, and decreased po intake. These symptoms seemed to have a rapid onset ~10 days and have not improved.   Seen by Dr Powell and impression was that there was unclear etiology of AMS. Concern for lithium toxicity vs autoimmune vs paraneoplastic encephalopathy vs seizures. Lithium levels now wnl.  LP done 4/12 to evaluate for infectious encephalitis (CSF cell count 1, glucose 79, protein 59). CSF PCR negative and bacterial culture no growth. CSF VDRL negative. He has no fever and leukocytosis improving, appeared stable off antibiotics. ESR/CRP unrevealing Multiple blood cultures and urine cultures no growth.   Wife w some concerns regarding impact of  impact of COVID and prior vaccination on current symptoms. Wife reports he was vaccinated and then after getting a COVID infection was given monoclonal antibody therapy.     RECOMMENDATIONS  Reviewed extensive and very thorough evaluation by Dr Powell and noted very elevated anti-spike ab levels of >250 and relatively unremarkable other investigations. Did note nl ESR with elevated CRP, isolated fevers on 4/10, fluctuating leukocytosis. Have already discussed with Dr Powell. Wife is very concerned about vaccine injury.    Thank you for consulting us and involving us in the management of this most interesting and challenging case.  Please call us at [phone] or text me directly on my cell#[phone] with any concerns or further questions but will defer to the ongoing excellent management of Dr Powell.

## 2023-04-18 NOTE — PROGRESS NOTE ADULT - SUBJECTIVE AND OBJECTIVE BOX
Patient is a 67y old  Male who presents with a chief complaint of AMS (18 Apr 2023 11:37)      INTERVAL /OVERNIGHT EVENTS: mental status labile    MEDICATIONS  (STANDING):  enoxaparin Injectable 40 milliGRAM(s) SubCutaneous every 24 hours  levETIRAcetam 500 milliGRAM(s) Oral two times a day  levothyroxine 25 MICROGram(s) Oral daily    MEDICATIONS  (PRN):  haloperidol    Injectable 2.5 milliGRAM(s) IntraMuscular every 6 hours PRN Agitation  labetalol Injectable 10 milliGRAM(s) IV Push every 6 hours PRN Systolic blood pressure >160      Allergies    No Known Allergies    Intolerances        REVIEW OF SYSTEMS:  unable to obtain    Vital Signs Last 24 Hrs  T(C): 36.5 (18 Apr 2023 11:59), Max: 36.7 (17 Apr 2023 21:15)  T(F): 97.7 (18 Apr 2023 11:59), Max: 98.1 (17 Apr 2023 21:15)  HR: 74 (18 Apr 2023 11:59) (74 - 115)  BP: 135/82 (18 Apr 2023 11:59) (135/82 - 173/70)  BP(mean): --  RR: 16 (18 Apr 2023 11:59) (16 - 96)  SpO2: 98% (18 Apr 2023 11:59) (95% - 98%)    Parameters below as of 18 Apr 2023 11:59  Patient On (Oxygen Delivery Method): room air        PHYSICAL EXAM:  GENERAL: NAD, well-groomed, well-developed  HEAD:  Atraumatic, Normocephalic  EYES: EOMI, PERRLA, conjunctiva and sclera clear  ENMT: No tonsillar erythema, exudates, or enlargement; Moist mucous membranes, Good dentition, No lesions  NECK: Supple, No JVD, Normal thyroid  NERVOUS SYSTEM:  ; Motor Strength 5/5 B/L upper and lower extremities; DTRs 2+ intact and symmetric  CHEST/LUNG: Clear to auscultation bilaterally; No rales, rhonchi, wheezing, or rubs  HEART: Regular rate and rhythm; No murmurs, rubs, or gallops  ABDOMEN: Soft, Nontender, Nondistended; Bowel sounds present  EXTREMITIES:  2+ Peripheral Pulses, No clubbing, cyanosis, or edema  LYMPH: No lymphadenopathy noted  SKIN: No rashes or lesions    LABS:                        12.9   9.43  )-----------( 276      ( 18 Apr 2023 07:45 )             39.0     18 Apr 2023 07:45    140    |  112    |  12     ----------------------------<  90     3.8     |  21     |  0.85     Ca    9.3        18 Apr 2023 07:45    TPro  6.6    /  Alb  3.0    /  TBili  0.6    /  DBili  x      /  AST  63     /  ALT  64     /  AlkPhos  126    18 Apr 2023 07:45        CAPILLARY BLOOD GLUCOSE          RADIOLOGY & ADDITIONAL TESTS:    Notes Reviewed:  [x ] YES  [ ] NO    Care Discussed with Consultants/Other Providers [x ] YES  [ ] NO

## 2023-04-18 NOTE — CONSULT NOTE ADULT - SUBJECTIVE AND OBJECTIVE BOX
[facility] DIVISION of INFECTIOUS DISEASE  Salas Hutson MD PhD, Elsa Mcnally MD, Sissy Rojas MD, Rashard Gonzalez MD, Tommy Baer MD  and providing coverage with Catrachito Fry MD  Providing Infectious Disease Consultations at [facility], [facility], [facility]'s    Office# [phone] to schedule follow up appointments  Answering Service for urgent calls or New Consults [phone]  Cell# to text for urgent issues Salas Hutson [phone]     HPI:  68 y/o male PMH BPD (diagnosed 2005), CKD3, hypothyroidism Urinary retention and insomnia presented to the ED for altered mental status with increased weakness and decreased po intake 4/8/23. Patient unable to answer questions secondary to mental status. History obtained from patient's wife and son via telephone. Patient was recently admitted to [facility] for a manic episode ~3 weeks prior. There his dose of Bupropion was decreased 150mg TID to once daily, he also had a normal CT head and was diagnosed with CKD3.  Soon after discharge, patient began to have increased weakness, unsteady gait with multiple mechanical falls, slurred speech, and decreased po intake. These symptoms seemed to have a rapid onset ~10 days and have not improved.     Seen by Dr Powell and impression was that there was unclear etiology of AMS. Concern for lithium toxicity vs autoimmune vs paraneoplastic encephalopathy vs seizures. Lithium levels now wnl.    LP done 4/12 to evaluate for infectious encephalitis (CSF cell count 1, glucose 79, protein 59). CSF PCR negative and bacterial culture no growth. CSF VDRL negative. He has no fever and leukocytosis improving, appeared stable off antibiotics. ESR/CRP unrevealing Multiple blood cultures and urine cultures no growth.     Wife w some concerns regarding impact of  impact of COVID and prior vaccination on current symptoms.      PAST MEDICAL & SURGICAL HISTORY:  Bipolar disorder  Stage 3 chronic kidney disease  Hard of hearing  Hypothyroidism  Urinary retention  Insomnia    H/O hernia repair    Antimicrobials      Immunological      Other  enoxaparin Injectable 40 milliGRAM(s) SubCutaneous every 24 hours  haloperidol    Injectable 2.5 milliGRAM(s) IntraMuscular every 6 hours PRN  labetalol Injectable 10 milliGRAM(s) IV Push every 6 hours PRN  levETIRAcetam 500 milliGRAM(s) Oral two times a day  levothyroxine 25 MICROGram(s) Oral daily      Allergies    No Known Allergies    Intolerances        SOCIAL HISTORY:  Tobacco: denied  EtOH: Former, used to drink beer, quit in 2005 after BPD diagnosis  Recreational drug use: denied  Lives with: wife  Ambulates: with assistance  ADLs: needs assistance  Occupation: Former postal , retired last year  Vaccinations: COVID UTD s/p antibody infusion therapy, Flu UTD (08 Apr 2023 13:21)      FAMILY HISTORY:  FH: Parkinson's disease (Mother)    FH: Alzheimers disease (Mother)        ROS:  limited due to cognitive status    Vital Signs Last 24 Hrs  T(C): 36.7 (18 Apr 2023 06:00), Max: 36.7 (17 Apr 2023 21:15)  T(F): 98.1 (18 Apr 2023 06:00), Max: 98.1 (17 Apr 2023 21:15)  HR: 78 (18 Apr 2023 06:00) (78 - 115)  BP: 162/92 (18 Apr 2023 06:00) (162/78 - 173/70)  BP(mean): --  RR: 96 (18 Apr 2023 06:00) (18 - 96)  SpO2: 95% (17 Apr 2023 21:15) (95% - 97%)    Parameters below as of 18 Apr 2023 06:00  Patient On (Oxygen Delivery Method): room air        PE:  In no distress  HEENT:  NC, PERRL, sclerae anicteric, conjunctivae clear, EOMI.  Sinuses nontender, no nasal exudate.  No buccal or pharyngeal lesions, erythema or exudate  Neck:  Supple, no adenopathy  Lungs:  No accessory muscle use, bilaterally clear to auscultation  Cor:  distant  Abd:  Symmetric, normoactive BS.  Soft, nontender, no masses, guarding or rebound.  Liver and spleen not enlarged  Extrem:  No cyanosis or edema  Skin:  No rashes.  Neuro: nonverbal very limited        LABS:                        12.9   9.43  )-----------( 276      ( 18 Apr 2023 07:45 )             39.0       WBC Count: 9.43 K/uL (04-18-23 @ 07:45)  WBC Count: 12.95 K/uL (04-17-23 @ 07:52)  WBC Count: 14.10 K/uL (04-16-23 @ 05:45)  WBC Count: 18.70 K/uL (04-15-23 @ 05:45)  WBC Count: 15.56 K/uL (04-14-23 @ 05:30)  WBC Count: 9.18 K/uL (04-13-23 @ 06:21)  WBC Count: 11.82 K/uL (04-12-23 @ 04:55)      04-18    140  |  112<H>  |  12  ----------------------------<  90  3.8   |  21<L>  |  0.85    Ca    9.3      18 Apr 2023 07:45  Mg     2.1     04-17    TPro  6.6  /  Alb  3.0<L>  /  TBili  0.6  /  DBili  x   /  AST  63<H>  /  ALT  64  /  AlkPhos  126<H>  04-18      Creatinine, Serum: 0.85 mg/dL (04-18-23 @ 07:45)  Creatinine, Serum: 1.00 mg/dL (04-17-23 @ 07:52)  Creatinine, Serum: 0.93 mg/dL (04-16-23 @ 05:45)  Creatinine, Serum: 1.00 mg/dL (04-15-23 @ 05:45)  Creatinine, Serum: 1.00 mg/dL (04-14-23 @ 05:30)  Creatinine, Serum: 0.92 mg/dL (04-13-23 @ 06:21)  Creatinine, Serum: 1.20 mg/dL (04-12-23 @ 04:55)    MICROBIOLOGY:      RADIOLOGY & ADDITIONAL STUDIES:    --

## 2023-04-18 NOTE — SWALLOW BEDSIDE ASSESSMENT ADULT - ORAL PHASE
suspect posterior loss suspect posterior loss/Delayed oral transit time Reduced attention to bolus requiring frequent redirection, suspect posterior loss/Delayed oral transit time

## 2023-04-18 NOTE — SOCIAL WORK PROGRESS NOTE - NSSWPROGRESSNOTE_GEN_ALL_CORE
Discussed at daily rounds. Case received after transfer from ICU to [place]. Patient remains on 1 to 1. Spoke with wife Alejandra by phone today. She confirmed patient was walking independently at home up until about 1 month ago. She said he had a rapid decline and discovered he had stopped taking his meds for 1 month (he told this to staff at [facility]). She states he was in an inpt. psych unit over 10 years ago.  Wife appears frustrated with his situation and states it is affecting the whole family. Support provided. Educated her re. role of social work and provided name and #. D/C planning folder left at bedside. Social work to follow for appropriate discharge  plan.

## 2023-04-18 NOTE — CONSULT NOTE ADULT - CONSULT REASON
AMS/ acute aggitation
MICHELL, Elevated lithium levels
.
AMS
second opinion regarding impact of COVID and prior vaccination on current symptoms

## 2023-04-19 LAB
AMPA-RECEPTOR ANTIBODY BY CBA, SERUM: NEGATIVE — SIGNIFICANT CHANGE UP
AMPHIPHYSIN AB TITR SER: NEGATIVE — SIGNIFICANT CHANGE UP
AUTO DIFF PNL BLD: ABNORMAL
C-ANCA SER-ACNC: NEGATIVE — SIGNIFICANT CHANGE UP
CARDIOLIPIN AB SER-ACNC: NEGATIVE — SIGNIFICANT CHANGE UP
CASPR2-IGG CBA, SERUM: NEGATIVE — SIGNIFICANT CHANGE UP
CRMP-5-IGG WESTERN BLOT: NEGATIVE — SIGNIFICANT CHANGE UP
GABA-B-RECEPTOR ANTIBODY BY CBA, SERUM: NEGATIVE — SIGNIFICANT CHANGE UP
GAD65 AB SER-MCNC: 0 NMOL/L — SIGNIFICANT CHANGE UP
GLIAL NUC TYPE 1 AB TITR SER: NEGATIVE — SIGNIFICANT CHANGE UP
HU1 AB TITR SER: NEGATIVE — SIGNIFICANT CHANGE UP
HU2 AB TITR SER IF: NEGATIVE — SIGNIFICANT CHANGE UP
HU3 AB TITR SER: NEGATIVE — SIGNIFICANT CHANGE UP
IFA NOTES: SIGNIFICANT CHANGE UP
IMMUNOLOGIST REVIEW: SIGNIFICANT CHANGE UP
LGI1-IGG CBA, SERUM: NEGATIVE — SIGNIFICANT CHANGE UP
NMDA-RECEPTOR ANTIBODY BY CBA, SERUM: NEGATIVE — SIGNIFICANT CHANGE UP
P-ANCA SER-ACNC: NEGATIVE — SIGNIFICANT CHANGE UP
PCA-1 AB TITR SER: NEGATIVE — SIGNIFICANT CHANGE UP
PCA-2 AB TITR SER: NEGATIVE — SIGNIFICANT CHANGE UP
PCA-TR AB TITR SER: NEGATIVE — SIGNIFICANT CHANGE UP
T PALLIDUM AB TITR SER: NEGATIVE — SIGNIFICANT CHANGE UP

## 2023-04-19 PROCEDURE — 99231 SBSQ HOSP IP/OBS SF/LOW 25: CPT

## 2023-04-19 RX ADMIN — LEVETIRACETAM 500 MILLIGRAM(S): 250 TABLET, FILM COATED ORAL at 05:23

## 2023-04-19 RX ADMIN — ENOXAPARIN SODIUM 40 MILLIGRAM(S): 100 INJECTION SUBCUTANEOUS at 23:29

## 2023-04-19 RX ADMIN — Medication 25 MICROGRAM(S): at 05:23

## 2023-04-19 RX ADMIN — LEVETIRACETAM 500 MILLIGRAM(S): 250 TABLET, FILM COATED ORAL at 18:03

## 2023-04-19 NOTE — PROGRESS NOTE ADULT - PROBLEM SELECTOR PLAN 6
Patient recently diagnosed w/ CKD3 at [facility]  - Cr. 1.9 on admission, baseline unknown  - Continue to monitor with daily BMP  - Avoid nephrotoxic agents.  - Renal follow up

## 2023-04-19 NOTE — PROGRESS NOTE ADULT - PROBLEM SELECTOR PLAN 2
Chronic, diagnosed 2005.  - Recent admission to [facility] for manic episode  - Hold lithium 2/2 litihium toxicity  - Psych (Dr. Floyd) consulted, appreciate recs

## 2023-04-19 NOTE — CHART NOTE - NSCHARTNOTEFT_GEN_A_CORE
Assessment: patient seen for malnutrition follow up   67y old  Male who presents with a chief complaint of AMS bipolar st 3 CKD   patient seen with 1:1 at bedside. eating up to 50% per flow sheet  4/19 BM        Factors impacting intake: [ ] none [ ] nausea  [ ] vomiting [ ] diarrhea [ ] constipation  [ ]chewing problems [x ] swallowing issues  [ ] other: speech today recommends easy to chew thin liquids     Diet Presciption: Diet, Pureed:   Mildly Thick Liquids (MILDTHICKLIQS) (04-17-23 @ 14:52)    Intake:   up to 50%   Current Weight: 4/19 183.2#      Pertinent Medications: MEDICATIONS  (STANDING):  enoxaparin Injectable 40 milliGRAM(s) SubCutaneous every 24 hours  levETIRAcetam 500 milliGRAM(s) Oral two times a day  levothyroxine 25 MICROGram(s) Oral daily    MEDICATIONS  (PRN):  haloperidol    Injectable 2.5 milliGRAM(s) IntraMuscular every 6 hours PRN Agitation  labetalol Injectable 10 milliGRAM(s) IV Push every 6 hours PRN Systolic blood pressure >160    Pertinent Labs: Alk Phos elevated  Skin: no pressure injury    Estimated Needs:   x[ ] no change since previous assessment based on # 25-30kcals/kg 1880-2256kcals and 67-82gms protein .9-1.1gms/kg  [ ] recalculated:     Previous Nutrition Diagnosis:   [ ] Inadequate Energy Intake [ ]Inadequate Oral Intake [ ] Excessive Energy Intake   [ ] Underweight [ ] Increased Nutrient Needs [ ] Overweight/Obesity   [ ] Altered GI Function [ ] Unintended Weight Loss [ ] Food & Nutrition Related Knowledge Deficit [x ] Malnutrition acute severe    Nutrition Diagnosis is x[ ] ongoing  [ ] resolved [ ] not applicable     New Nutrition Diagnosis: [x ] not applicable       Interventions:   Recommend  [x ] Change Diet To: easy to chew thin liquids  [x ] Nutrition Supplement ensure plus HP daily   [ ] Nutrition Support  [x ] Other: recommend MVI     Monitoring and Evaluation:   [ ] PO intake [ x ] Tolerance to diet prescription [ x ] weights [ x ] labs[ x ] follow up per protocol  [ ] other:

## 2023-04-19 NOTE — PROGRESS NOTE ADULT - SUBJECTIVE AND OBJECTIVE BOX
[facility]  INFECTIOUS DISEASES - [place], [address], [place] [address]  Tel: [phone]     Fax: [phone]  =======================================================    MAXI FANG [id number]    Follow up: No fevers. Asleep at the time of my exam. Prior to that sitter said he would occasionally try to leave bed. Does not know where he is but knows his wife.    Allergies:  No Known Allergies      Antibiotics:  enoxaparin Injectable 40 milliGRAM(s) SubCutaneous every 24 hours  haloperidol    Injectable 2.5 milliGRAM(s) IntraMuscular every 6 hours PRN  labetalol Injectable 10 milliGRAM(s) IV Push every 6 hours PRN  levETIRAcetam 500 milliGRAM(s) Oral two times a day  levothyroxine 25 MICROGram(s) Oral daily       REVIEW OF SYSTEMS:  unable to obtain 2/2 mental status     Physical Exam:  ICU Vital Signs Last 24 Hrs  T(C): 36.6 (19 Apr 2023 06:00), Max: 36.6 (19 Apr 2023 06:00)  T(F): 97.8 (19 Apr 2023 06:00), Max: 97.8 (19 Apr 2023 06:00)  HR: 77 (19 Apr 2023 06:00) (77 - 77)  BP: 142/85 (19 Apr 2023 06:00) (142/85 - 142/85)  BP(mean): --  ABP: --  ABP(mean): --  RR: 18 (19 Apr 2023 06:00) (18 - 18)  SpO2: 98% (19 Apr 2023 06:00) (98% - 98%)    O2 Parameters below as of 19 Apr 2023 06:00  Patient On (Oxygen Delivery Method): room air      GEN: not in apparent distress  HEENT: normocephalic and atraumatic.  NECK: Supple.   LUNGS: Clear to auscultation.  HEART: Regular rate and rhythm   ABDOMEN: Soft, nontender, and nondistended.    EXTREMITIES: No leg edema.  NEUROLOGIC: unable to assess    Labs:  04-18    140  |  112<H>  |  12  ----------------------------<  90  3.8   |  21<L>  |  0.85    Ca    9.3      18 Apr 2023 07:45    TPro  6.6  /  Alb  3.0<L>  /  TBili  0.6  /  DBili  x   /  AST  63<H>  /  ALT  64  /  AlkPhos  126<H>  04-18                          12.9   9.43  )-----------( 276      ( 18 Apr 2023 07:45 )             39.0         LIVER FUNCTIONS - ( 18 Apr 2023 07:45 )  Alb: 3.0 g/dL / Pro: 6.6 g/dL / ALK PHOS: 126 U/L / ALT: 64 U/L / AST: 63 U/L / GGT: x             RECENT CULTURES:  04-12 @ 17:23 .CSF CSF     No growth    No polymorphonuclear cells seen  No organisms seen  by cytocentrifuge      04-10 @ 03:48 .Blood Blood-Peripheral     No Growth Final        04-10 @ 03:45 .Blood Blood-Peripheral     No Growth Final        04-08 @ 10:40 Clean Catch Clean Catch (Midstream)     No growth        04-08 @ 07:00 .Blood Blood-Peripheral     No Growth Final              All imaging and data are reviewed.

## 2023-04-19 NOTE — BH CONSULTATION LIAISON PROGRESS NOTE - NSBHCHARTREVIEWLAB_PSY_A_CORE FT
13.4   12.95 )-----------( 279      ( 17 Apr 2023 07:52 )             40.4   04-17    141  |  110<H>  |  13  ----------------------------<  95  3.4<L>   |  25  |  1.00    Ca    9.2      17 Apr 2023 07:52  Mg     2.1     04-17    TPro  6.7  /  Alb  3.0<L>  /  TBili  0.5  /  DBili  x   /  AST  54<H>  /  ALT  70  /  AlkPhos  123<H>  04-16  
                      13.3   15.56 )-----------( 261      ( 14 Apr 2023 05:30 )             40.9   04-14    144  |  114<H>  |  6<L>  ----------------------------<  122<H>  3.0<L>   |  24  |  1.00    Ca    9.2      14 Apr 2023 05:30  Phos  2.5     04-14  Mg     2.0     04-14    
                      12.9   9.43  )-----------( 276      ( 18 Apr 2023 07:45 )             39.0   04-18    140  |  112<H>  |  12  ----------------------------<  90  3.8   |  21<L>  |  0.85    Ca    9.3      18 Apr 2023 07:45    TPro  6.6  /  Alb  3.0<L>  /  TBili  0.6  /  DBili  x   /  AST  63<H>  /  ALT  64  /  AlkPhos  126<H>  04-18

## 2023-04-19 NOTE — PROGRESS NOTE ADULT - SUBJECTIVE AND OBJECTIVE BOX
Neurology follow up note    MAXI [id number]      Interval History:    Patient resting in bed     Allergies    No Known Allergies    Intolerances        MEDICATIONS    enoxaparin Injectable 40 milliGRAM(s) SubCutaneous every 24 hours  haloperidol    Injectable 2.5 milliGRAM(s) IntraMuscular every 6 hours PRN  labetalol Injectable 10 milliGRAM(s) IV Push every 6 hours PRN  levETIRAcetam 500 milliGRAM(s) Oral two times a day  levothyroxine 25 MICROGram(s) Oral daily              Vital Signs Last 24 Hrs  T(C): 36.6 (19 Apr 2023 06:00), Max: 36.6 (19 Apr 2023 06:00)  T(F): 97.8 (19 Apr 2023 06:00), Max: 97.8 (19 Apr 2023 06:00)  HR: 77 (19 Apr 2023 06:00) (74 - 77)  BP: 142/85 (19 Apr 2023 06:00) (135/82 - 142/85)  BP(mean): --  RR: 18 (19 Apr 2023 06:00) (16 - 18)  SpO2: 98% (19 Apr 2023 06:00) (98% - 98%)    Parameters below as of 19 Apr 2023 06:00  Patient On (Oxygen Delivery Method): room air      REVIEW OF SYSTEMS: Limited or unable to obtain secondary to patient's poor mental status would answer ok or alright to any question ask to him       On Neurological Examination:    Mental Status - Patient is awake alert  was able to tell spouse name janis  did not name objects     on and off following commands    Speech - says few words      Cranial Nerves -   extraocular eye movements intact.   intact bilateral NLF    Motor Exam -  With stimuli positive movement of all 4 extremities    Muscle tone - is normal all over.  No asymmetry is seen.      Sensory    Bilateral intact to light touch    GENERAL Exam: Nontoxic , No Acute Distress   	  HEENT:  normocephalic, atraumatic  		  LUNGS:  Decreased bilaterally  	  HEART: Normal S1S2   No murmur RRR        	  GI/ ABDOMEN:  Soft  Non tender    EXTREMITIES:   No Edema  No Clubbing  No Cyanosis     SKIN: Normal  No Ecchymosis                 LABS:  CBC Full  -  ( 18 Apr 2023 07:45 )  WBC Count : 9.43 K/uL  RBC Count : 4.31 M/uL  Hemoglobin : 12.9 g/dL  Hematocrit : 39.0 %  Platelet Count - Automated : 276 K/uL  Mean Cell Volume : 90.5 fl  Mean Cell Hemoglobin : 29.9 pg  Mean Cell Hemoglobin Concentration : 33.1 gm/dL  Auto Neutrophil # : x  Auto Lymphocyte # : x  Auto Monocyte # : x  Auto Eosinophil # : x  Auto Basophil # : x  Auto Neutrophil % : x  Auto Lymphocyte % : x  Auto Monocyte % : x  Auto Eosinophil % : x  Auto Basophil % : x      04-18    140  |  112<H>  |  12  ----------------------------<  90  3.8   |  21<L>  |  0.85    Ca    9.3      18 Apr 2023 07:45    TPro  6.6  /  Alb  3.0<L>  /  TBili  0.6  /  DBili  x   /  AST  63<H>  /  ALT  64  /  AlkPhos  126<H>  04-18    Hemoglobin A1C:     LIVER FUNCTIONS - ( 18 Apr 2023 07:45 )  Alb: 3.0 g/dL / Pro: 6.6 g/dL / ALK PHOS: 126 U/L / ALT: 64 U/L / AST: 63 U/L / GGT: x           Vitamin B12         RADIOLOGY        AMS possible underlying psych issues confusion   psych follow up adjust medications as needed  possible underlying seizures on keppra  hypothyroidism synthroid   autoimmune work up underway   h/o of poor sleep anitha as per spouse   spoke to spouse janis   4/19  today appeared calmer and did tell spouse name   Greater than 45 minutes of time was spent with the patient, plan of care, reviewing data, with greater than  50% of the visit was spent counseling and/or coordinating care with multidisciplinary healthcare team

## 2023-04-19 NOTE — SOCIAL WORK PROGRESS NOTE - NSSWPROGRESSNOTE_GEN_ALL_CORE
CYRUS spoke with pt spouse Alejandra via phone. CYRUS confirmed that pt was home for 10D following hospital visit at [facility] for medical/ BH concerns. Pt has 4 steps to enter home and bedroom is on main floor once he enters. Alejandra expressed concerns about stairs and feels that pt would benefit from outpt PT. Alejandra feels that she needs additional support at home for pt physical needs. Pending MD clearance for d/c plan and recommendations. SW will alert MD. SW will continue to follow and remain available for any needs.

## 2023-04-19 NOTE — PROGRESS NOTE ADULT - ASSESSMENT
68 y/o male PMH Bipolar disease (diagnosed 2005), CKD3, hypothyroidism, Urinary retention, insomnia, who presented with altered mental status with increased weakness and decreased po intake. Changes in mental status started about 4 weeks ago per wife. Unclear etiology of AMS. Concern for lithium toxicity vs autoimmune vs paraneoplastic encephalopathy vs seizures. Lithium levels now wnl.    LP done 4/12 to evaluate for infectious encephalitis (CSF cell count 1, glucose 79, protein 59). CSF PCR negative and bacterial culture no growth. CSF Lyme nonreactive and VDRL negative. He has no fever and leukocytosis Improved, stable off antibiotics. ESR/CRP unrevealing Multiple blood cultures and urine cultures no growth. Discussed COVID elevated spike antibody with wife previously, likely related to prior vaccination. Appreciate Dr. Hutson's input as well.    -follow up CSF protein 14-3-3  -aspiration precautions    Jenna Powell MD  Division of Infectious Diseases   Cell [phone] between 8am and 6pm   After 6pm and weekends please call ID service at [phone].    68 y/o male PMH Bipolar disease (diagnosed 2005), CKD3, hypothyroidism, Urinary retention, insomnia, who presented with altered mental status with increased weakness and decreased po intake. Changes in mental status started about 4 weeks ago per wife. Unclear etiology of AMS. Concern for lithium toxicity vs autoimmune vs paraneoplastic encephalopathy vs seizures. Lithium levels now wnl.    LP done 4/12 to evaluate for infectious encephalitis (CSF cell count 1, glucose 79, protein 59). CSF PCR negative and bacterial culture no growth. CSF Lyme nonreactive and VDRL negative. He has no fever and leukocytosis Improved, stable off antibiotics. ESR/CRP unrevealing Multiple blood cultures and urine cultures no growth. Discussed COVID elevated spike antibody with wife previously, likely related to prior vaccination. Appreciate Dr. Hutson's input as well.    -monitor LFT's  -follow up CSF protein 14-3-3  -aspiration precautions    Jenna Powell MD  Division of Infectious Diseases   Cell [phone] between 8am and 6pm   After 6pm and weekends please call ID service at [phone].

## 2023-04-19 NOTE — PROGRESS NOTE ADULT - SUBJECTIVE AND OBJECTIVE BOX
Patient is a 67y old  Male who presents with a chief complaint of AMS (19 Apr 2023 13:53)  remains agitated      INTERVAL HPI/OVERNIGHT EVENTS:  T(C): 36.6 (04-19-23 @ 06:00), Max: 36.6 (04-19-23 @ 06:00)  HR: 77 (04-19-23 @ 06:00) (77 - 77)  BP: 142/85 (04-19-23 @ 06:00) (142/85 - 142/85)  RR: 18 (04-19-23 @ 06:00) (18 - 18)  SpO2: 98% (04-19-23 @ 06:00) (98% - 98%)  Wt(kg): --  I&O's Summary    18 Apr 2023 07:01  -  19 Apr 2023 07:00  --------------------------------------------------------  IN: 0 mL / OUT: 400 mL / NET: -400 mL        LABS:                        12.9   9.43  )-----------( 276      ( 18 Apr 2023 07:45 )             39.0     04-18    140  |  112<H>  |  12  ----------------------------<  90  3.8   |  21<L>  |  0.85    Ca    9.3      18 Apr 2023 07:45    TPro  6.6  /  Alb  3.0<L>  /  TBili  0.6  /  DBili  x   /  AST  63<H>  /  ALT  64  /  AlkPhos  126<H>  04-18        CAPILLARY BLOOD GLUCOSE                MEDICATIONS  (STANDING):  enoxaparin Injectable 40 milliGRAM(s) SubCutaneous every 24 hours  levETIRAcetam 500 milliGRAM(s) Oral two times a day  levothyroxine 25 MICROGram(s) Oral daily    MEDICATIONS  (PRN):  haloperidol    Injectable 2.5 milliGRAM(s) IntraMuscular every 6 hours PRN Agitation  labetalol Injectable 10 milliGRAM(s) IV Push every 6 hours PRN Systolic blood pressure >160      REVIEW OF SYSTEMS:  CONSTITUTIONAL: No fever, weight loss, or fatigue  EYES: No eye pain, visual disturbances, or discharge  ENMT:  No difficulty hearing, tinnitus, vertigo; No sinus or throat pain  NECK: No pain or stiffness  RESPIRATORY: No cough, wheezing, chills or hemoptysis; No shortness of breath  CARDIOVASCULAR: No chest pain, palpitations, dizziness, or leg swelling  GASTROINTESTINAL: No abdominal or epigastric pain. No nausea, vomiting, or hematemesis; No diarrhea or constipation. No melena or hematochezia.  GENITOURINARY: No dysuria, frequency, hematuria, or incontinence  NEUROLOGICAL: agiktation and confused  SKIN: No itching, burning, rashes, or lesions   LYMPH NODES: No enlarged glands  ENDOCRINE: No heat or cold intolerance; No hair loss  MUSCULOSKELETAL: No joint pain or swelling; No muscle, back, or extremity pain  PSYCHIATRIC: No depression, anxiety, mood swings, or difficulty sleeping  HEME/LYMPH: No easy bruising, or bleeding gums  ALLERY AND IMMUNOLOGIC: No hives or eczema    RADIOLOGY & ADDITIONAL TESTS:    Imaging Personally Reviewed:  [x ] YES  [ ] NO    Consultant(s) Notes Reviewed:  [x ] YES  [ ] NO    PHYSICAL EXAM:  GENERAL: NAD, well-groomed, well-developed  HEAD:  Atraumatic, Normocephalic  EYES: EOMI, PERRLA, conjunctiva and sclera clear  ENMT: No tonsillar erythema, exudates, or enlargement; Moist mucous membranes, Good dentition, No lesions  NECK: Supple, No JVD, Normal thyroid  NERVOUS SYSTEM:  Alert & Oriented X1, Good concentration; Motor Strength 5/5 B/L upper and lower extremities; DTRs 2+ intact and symmetric  CHEST/LUNG: Clear to percussion bilaterally; No rales, rhonchi, wheezing, or rubs  HEART: Regular rate and rhythm; No murmurs, rubs, or gallops  ABDOMEN: Soft, Nontender, Nondistended; Bowel sounds present  EXTREMITIES:  2+ Peripheral Pulses, No clubbing, cyanosis, or edema  LYMPH: No lymphadenopathy noted  SKIN: No rashes or lesions    Care Discussed with Consultants/Other Providers [x ] YES  [ ] NO    advance care planning and advance directives discussed, including but not limited to long term care planning, and all forms reviewed [x]YES  [ ]NO
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
## 2023-04-20 LAB
ALBUMIN SERPL ELPH-MCNC: 3.4 G/DL — SIGNIFICANT CHANGE UP (ref 3.3–5)
ALP SERPL-CCNC: 136 U/L — HIGH (ref 40–120)
ALT FLD-CCNC: 53 U/L — SIGNIFICANT CHANGE UP (ref 12–78)
AMMONIA BLD-MCNC: 32 UMOL/L — SIGNIFICANT CHANGE UP (ref 11–32)
AMPA-R AB CBA, CSF: NEGATIVE — SIGNIFICANT CHANGE UP
AMPHIPHYSIN AB TITR CSF: NEGATIVE — SIGNIFICANT CHANGE UP
ANION GAP SERPL CALC-SCNC: 7 MMOL/L — SIGNIFICANT CHANGE UP (ref 5–17)
AST SERPL-CCNC: 34 U/L — SIGNIFICANT CHANGE UP (ref 15–37)
BILIRUB SERPL-MCNC: 0.4 MG/DL — SIGNIFICANT CHANGE UP (ref 0.2–1.2)
BUN SERPL-MCNC: 18 MG/DL — SIGNIFICANT CHANGE UP (ref 7–23)
CALCIUM SERPL-MCNC: 9.6 MG/DL — SIGNIFICANT CHANGE UP (ref 8.5–10.1)
CASPR2-IGG CBA, CSF: NEGATIVE — SIGNIFICANT CHANGE UP
CHLORIDE SERPL-SCNC: 104 MMOL/L — SIGNIFICANT CHANGE UP (ref 96–108)
CO2 SERPL-SCNC: 26 MMOL/L — SIGNIFICANT CHANGE UP (ref 22–31)
CREAT SERPL-MCNC: 1.1 MG/DL — SIGNIFICANT CHANGE UP (ref 0.5–1.3)
CV2 IGG TITR CSF: NEGATIVE — SIGNIFICANT CHANGE UP
DPPX ANTIBODY IFA, CSF: NEGATIVE — SIGNIFICANT CHANGE UP
EGFR: 73 ML/MIN/1.73M2 — SIGNIFICANT CHANGE UP
GABA-B-R AB CBA, CSF: NEGATIVE — SIGNIFICANT CHANGE UP
GAD65 AB CSF-SCNC: 0 NMOL/L — SIGNIFICANT CHANGE UP
GFAP IFA, CSF: NEGATIVE — SIGNIFICANT CHANGE UP
GLIAL NUC TYPE 1 AB TITR CSF: NEGATIVE — SIGNIFICANT CHANGE UP
GLUCOSE SERPL-MCNC: 112 MG/DL — HIGH (ref 70–99)
HU1 AB TITR CSF IF: NEGATIVE — SIGNIFICANT CHANGE UP
HU2 AB TITR CSF IF: NEGATIVE — SIGNIFICANT CHANGE UP
HU3 AB TITR CSF: NEGATIVE — SIGNIFICANT CHANGE UP
IFA NOTES: SIGNIFICANT CHANGE UP
IGLON5 IFA, CSF: NEGATIVE — SIGNIFICANT CHANGE UP
IMMUNOLOGIST REVIEW: SIGNIFICANT CHANGE UP
LGI1-IGG CBA, CSF: NEGATIVE — SIGNIFICANT CHANGE UP
MGLUR1 AB IFA, CSF: NEGATIVE — SIGNIFICANT CHANGE UP
PCA-TR AB TITR CSF: NEGATIVE — SIGNIFICANT CHANGE UP
POTASSIUM SERPL-MCNC: 3.6 MMOL/L — SIGNIFICANT CHANGE UP (ref 3.5–5.3)
POTASSIUM SERPL-SCNC: 3.6 MMOL/L — SIGNIFICANT CHANGE UP (ref 3.5–5.3)
PROT SERPL-MCNC: 7.3 G/DL — SIGNIFICANT CHANGE UP (ref 6–8.3)
SODIUM SERPL-SCNC: 137 MMOL/L — SIGNIFICANT CHANGE UP (ref 135–145)

## 2023-04-20 PROCEDURE — 99231 SBSQ HOSP IP/OBS SF/LOW 25: CPT

## 2023-04-20 PROCEDURE — 99232 SBSQ HOSP IP/OBS MODERATE 35: CPT

## 2023-04-20 RX ORDER — TAMSULOSIN HYDROCHLORIDE 0.4 MG/1
0.8 CAPSULE ORAL AT BEDTIME
Refills: 0 | Status: DISCONTINUED | OUTPATIENT
Start: 2023-04-20 | End: 2023-04-24

## 2023-04-20 RX ORDER — QUETIAPINE FUMARATE 200 MG/1
200 TABLET, FILM COATED ORAL AT BEDTIME
Refills: 0 | Status: DISCONTINUED | OUTPATIENT
Start: 2023-04-20 | End: 2023-04-21

## 2023-04-20 RX ADMIN — QUETIAPINE FUMARATE 200 MILLIGRAM(S): 200 TABLET, FILM COATED ORAL at 21:58

## 2023-04-20 RX ADMIN — Medication 25 MICROGRAM(S): at 05:44

## 2023-04-20 RX ADMIN — LEVETIRACETAM 500 MILLIGRAM(S): 250 TABLET, FILM COATED ORAL at 05:44

## 2023-04-20 RX ADMIN — LEVETIRACETAM 500 MILLIGRAM(S): 250 TABLET, FILM COATED ORAL at 18:12

## 2023-04-20 RX ADMIN — TAMSULOSIN HYDROCHLORIDE 0.8 MILLIGRAM(S): 0.4 CAPSULE ORAL at 21:58

## 2023-04-20 RX ADMIN — ENOXAPARIN SODIUM 40 MILLIGRAM(S): 100 INJECTION SUBCUTANEOUS at 23:45

## 2023-04-20 NOTE — PHYSICAL THERAPY INITIAL EVALUATION ADULT - GAIT TRAINING, PT EVAL
Patient will ambulate x 100 feet with appropriate device with supervision in 2 weeks in order to safely navigate home

## 2023-04-20 NOTE — PROGRESS NOTE ADULT - PROBLEM SELECTOR PLAN 6
Patient recently diagnosed w/ CKD3 at [facility]  - Cr. 1.9 on admission, baseline unknown  - Continue to monitor with daily BMP  - Avoid nephrotoxic agents.  - Renal follow up

## 2023-04-20 NOTE — PHYSICAL THERAPY INITIAL EVALUATION ADULT - PREDICTED DURATION OF THERAPY (DAYS/WKS), PT EVAL
2 weeks
No skilled PT Needs, patient not a candidate for physical therapy at this time as not able to follow directions, re-order when applicable

## 2023-04-20 NOTE — SWALLOW BEDSIDE ASSESSMENT ADULT - SWALLOW EVAL: RECOMMENDED FEEDING/EATING TECHNIQUES
allow for swallow between intakes/check mouth frequently for oral residue/pocketing/crush medication (when feasible)/maintain upright posture during/after eating for 30 mins/oral hygiene/position upright (90 degrees)/small sips/bites

## 2023-04-20 NOTE — SWALLOW BEDSIDE ASSESSMENT ADULT - SPECIFY REASON(S)
Reassess swallow to determine candidacy for diet advancement
R/O dysphagia
Reassess swallow to determine candidacy for diet advancement

## 2023-04-20 NOTE — PHYSICAL THERAPY INITIAL EVALUATION ADULT - ADDITIONAL COMMENTS
Patient lives with family in private home, as per chart patient required assistance with ADLs and ambulation, with increased assistance noted recently.
Patient lives in private home with spouse +4 JOI, then resides on main floor.  Patient requires assistance for all ADLs and was able to ambulate independently but has demonstrated decline and requires assistance to safely ambulate

## 2023-04-20 NOTE — PROVIDER CONTACT NOTE (OTHER) - DATE AND TIME:
17-Apr-2023 03:20
09-Apr-2023 23:30
20-Apr-2023 16:22
08-Apr-2023 22:23
09-Apr-2023 12:30
09-Apr-2023 14:45

## 2023-04-20 NOTE — SWALLOW BEDSIDE ASSESSMENT ADULT - ASR SWALLOW ASPIRATION MONITOR
change of breathing pattern/oral hygiene/position upright (90Y)/cough/gurgly voice/fever/pneumonia/throat clearing/upper respiratory infection
if any s/s penetration/aspiration noted, d/c PO & initiate NPO with SLP to reassess/change of breathing pattern/oral hygiene/position upright (90Y)/cough/gurgly voice/fever/pneumonia/throat clearing/upper respiratory infection
if any s/s penetration/aspiration noted, d/c PO & initiate NPO with SLP to reassess/change of breathing pattern/oral hygiene/position upright (90Y)/cough/gurgly voice/fever/pneumonia/throat clearing/upper respiratory infection

## 2023-04-20 NOTE — PHYSICAL THERAPY INITIAL EVALUATION ADULT - PERTINENT HX OF CURRENT PROBLEM, REHAB EVAL
68 y/o male PMH BPD (diagnosed 2005), CKD3, hypothyroidism Urinary retention and insomnia presents to the ED for altered mental status with increased weakness and decreased po intake. Patient unable to answer questions secondary to mental status. History obtained from patient's wife and son via telephone. Patient was recently admitted to [facility] for a manic episode ~3 weeks ago. There his dose of Bupropion was decreased 150mg TID to once daily, he also had a normal CT head and was diagnosed with CKD3.  Soon after discharge, patient began to have increased weakness, unsteady gait with multiple mechanical falls, slurred speech, and decreased po intake. These symptoms seemed to have a rapid onset ~10 days and have not improved. His son also notes, he seemed to have pain with swallowing as well.
66 y/o male PMH BPD (diagnosed 2005), CKD3, hypothyroidism Urinary retention and insomnia presents to the ED for altered mental status with increased weakness and decreased po intake. Patient unable to answer questions secondary to mental status. History obtained from patient's wife and son via telephone. Patient was recently admitted to [facility] for a manic episode ~3 weeks ago. There his dose of Bupropion was decreased 150mg TID to once daily, he also had a normal CT head and was diagnosed with CKD3.  Soon after discharge, patient began to have increased weakness, unsteady gait with multiple mechanical falls, slurred speech, and decreased po intake. These symptoms seemed to have a rapid onset ~10 days and have not improved. His son also notes, he seemed to have pain with swallowing as well.   Of note, patient's mom also had hx of Alzheimer's dementia and parkinson's disease which she was diagnosed with in her 70s and family is concerned he might be developing a neurological disorder.

## 2023-04-20 NOTE — PROGRESS NOTE ADULT - SUBJECTIVE AND OBJECTIVE BOX
Neurology follow up note    MAXI [id number]      Interval History:    Patient feels ok     Allergies    No Known Allergies    Intolerances        MEDICATIONS    enoxaparin Injectable 40 milliGRAM(s) SubCutaneous every 24 hours  haloperidol    Injectable 2.5 milliGRAM(s) IntraMuscular every 6 hours PRN  labetalol Injectable 10 milliGRAM(s) IV Push every 6 hours PRN  levETIRAcetam 500 milliGRAM(s) Oral two times a day  levothyroxine 25 MICROGram(s) Oral daily              Vital Signs Last 24 Hrs  T(C): 36.5 (20 Apr 2023 05:47), Max: 36.5 (19 Apr 2023 21:45)  T(F): 97.7 (20 Apr 2023 05:47), Max: 97.7 (19 Apr 2023 21:45)  HR: 68 (20 Apr 2023 05:47) (68 - 70)  BP: 122/81 (20 Apr 2023 05:47) (122/81 - 134/78)  BP(mean): --  RR: 17 (20 Apr 2023 05:47) (17 - 19)  SpO2: 96% (20 Apr 2023 05:47) (96% - 97%)    Parameters below as of 20 Apr 2023 05:47  Patient On (Oxygen Delivery Method): room air        REVIEW OF SYSTEMS: Limited or unable to obtain secondary to patient's poor mental status would answer ok or alright to any question ask to him       On Neurological Examination:    Mental Status - Patient is awake alert  was able to tell spouse name janis  did not name objects     on and off following commands    Speech - says few words      Cranial Nerves -   extraocular eye movements intact.   intact bilateral NLF    Motor Exam -  With stimuli positive movement of all 4 extremities    Muscle tone - is normal all over.  No asymmetry is seen.      Sensory    Bilateral intact to light touch    GENERAL Exam: Nontoxic , No Acute Distress   	  HEENT:  normocephalic, atraumatic  		  LUNGS:  Decreased bilaterally  	  HEART: Normal S1S2   No murmur RRR        	  GI/ ABDOMEN:  Soft  Non tender    EXTREMITIES:   No Edema  No Clubbing  No Cyanosis     SKIN: Normal  No Ecchymosis               LABS:            Hemoglobin A1C:       Vitamin B12         RADIOLOGY    ASSESSMENT AND PLAN   AMS possible underlying psych issues confusion   psych follow up adjust medications as needed  possible underlying seizures on keppra  hypothyroidism synthroid   autoimmune work up panel was negative   h/o of poor sleep anitha as per spouse   spoke to spouse janis   4/20  today more awake and interactive talkative but was saying irrelevant things as per spouse when she spoke to him was better   Greater than 45 minutes of time was spent with the patient, plan of care, reviewing data, with greater than  50% of the visit was spent counseling and/or coordinating care with multidisciplinary healthcare team

## 2023-04-20 NOTE — SWALLOW BEDSIDE ASSESSMENT ADULT - SWALLOW EVAL: FEEDING ASSISTANCE
1:1 supervision with all PO
1:1 supervision with all PO due to reduced cognition
1:1 supervision with all PO

## 2023-04-20 NOTE — PHYSICAL THERAPY INITIAL EVALUATION ADULT - RANGE OF MOTION EXAMINATION, REHAB EVAL
bilateral upper extremity ROM was WNL (within normal limits)/bilateral lower extremity was ROM was WNL (within normal limits)
bilateral lower extremity was ROM was WNL (within normal limits)/bilateral upper extremity ROM was WFL (within functional limits)

## 2023-04-20 NOTE — SWALLOW BEDSIDE ASSESSMENT ADULT - SWALLOW EVAL: DIAGNOSIS
Mild oral dysphagia marked by increased oral transit time, suspect posterior loss, reduced attention to bolus with regular solid requiring frequent redirection.  Reduced cognition further impacting overall oral stage.  Pharyngeal stage deemed functional with all administered consistencies, no overt s/s penetration/aspiration noted.
Mild-moderate oral dysphagia marked by increased oral transit time, suspect posterior loss.  Suspect pharyngeal dysphagia with thin liquids due to +cough post swallow.  No overt s/s penetration/aspiration noted.  Suspect overall reduced cognition further impacting overall swallow profile.
Mild oral dysphagia marked by reduced attention to bolus, however, pt able to be redirected, suspect posterior loss.  Pharyngeal stage deemed WFL, no overt s/s penetration/aspiration noted.

## 2023-04-20 NOTE — SWALLOW BEDSIDE ASSESSMENT ADULT - SLP PERTINENT HISTORY OF CURRENT PROBLEM
Pt seen for swallow evaluation 4/17/23 Rx puree with mildly thick liquids, see report for details.
Pt seen multiple times this admission, most recently 4/18/23 Rx easy to chew with thin liquids, see report for details.

## 2023-04-20 NOTE — PHYSICAL THERAPY INITIAL EVALUATION ADULT - BED MOBILITY TRAINING, PT EVAL
Patient will perform all bed mobility with supervision in 2 weeks in order to increase mobility at home

## 2023-04-20 NOTE — SOCIAL WORK PROGRESS NOTE - NSSWPROGRESSNOTE_GEN_ALL_CORE
Discussed at daily rounds.  PT recommending [facility] vs [facility] with 24 hour supervision, states  more of cognitive issue & he walked with walker with assist. Wife informed & willing to take home. Does not want [facility] unsure about HCS. wife aware he  needs psych flup and states prior place he was going for tx closed. Left wife  list out patient mental health clinics and out patient PT at bedside. Informed wife that PT feesl he should start with HC PT rather than go for out pt. PT.  Discussed at daily rounds.  PT recommending [facility] vs [facility] with 24 hour supervision, states  more of cognitive issue & he walked with walker with assist. Wife informed & willing to take home. She states she is retired and will be home with him. She does not want [facility] unsure about HCS. Wife aware he  needs psych flup and states prior place he was going for tx closed. Left wife  list out patient mental health clinics and out patient PT at bedside. Wife interested in out patient PT, informed her that PT feels he should start with [facility] PT rather than go for out pt PT.  updated.

## 2023-04-20 NOTE — SWALLOW BEDSIDE ASSESSMENT ADULT - SLP GENERAL OBSERVATIONS
Pt received in bed A&A Ox0, reduced cognition, reduced command following, on RA, +b/l wrist restraints, +1:1 supervision at bedside, encouragement required to participate, pain scale 0/10 pre & post eval
Pt received upright in bed A&A Ox0, on RA, reduced cognition, reduced command following, +1:1 supervision present at bedside, pain scale 0/10 pre & post eval
Pt received upright in bed A&A Ox0, reduced cognition, reduced command following, pt calm and cooperative, on RA, +1:1 supervision at bedside, pain scale 0/10 pre & post assessment
No

## 2023-04-20 NOTE — PROGRESS NOTE ADULT - ASSESSMENT
66 y/o male PMH Bipolar disease (diagnosed 2005), CKD3, hypothyroidism, Urinary retention, insomnia, who presented with altered mental status with increased weakness and decreased po intake. Changes in mental status started about 4 weeks ago per wife. Unclear etiology of AMS. Initial concern for lithium toxicity vs autoimmune vs paraneoplastic encephalopathy vs seizures. LP done 4/12 to evaluate for infectious encephalitis (CSF cell count 1, glucose 79, protein 59). CSF PCR including HSV are negative and bacterial culture no growth. CSF Lyme nonreactive; West Nile and VDRL negative.     Patient remains afebrile and stable off antibiotics. Offered HIV testing, CT chest and A/P to wife over the phone--but she declined these and stated she does not want any further testing done.    -follow up CSF protein 14-3-3  -aspiration precautions  -discussed with wife over the phone  -will sign off, please call ID if any further questions. Thank you.    Jenna Powell MD  Division of Infectious Diseases   Cell [phone] between 8am and 6pm   After 6pm and weekends please call ID service at [phone].

## 2023-04-20 NOTE — PROVIDER CONTACT NOTE (OTHER) - ASSESSMENT
Pt @ baseline, got up on his own, amb in room, denies pain and discomfort
Pt extremely agitated and restless pulling off stat lock of carey and trying to rip off side bed rails and pulling at IV tubing  Pt unable to be reoriented
Pt extremely agitated restless pulling tubing and carey  Pt at risk to harm self and fall out of bed  Pt not redirectable at all
Pt is awake, unable to follow commands
Pt agitated. Attempting to climb out of bed. Throwing legs over side rails. Pt on b/l wrist restraints. Pt at risk for fall and harm. PRN haldol administered
Pt AOX0, unable to follow commands, climbing OOB, Pulled out 3rd IV, climbing OOB. 5 employees at bedside.

## 2023-04-20 NOTE — PROVIDER CONTACT NOTE (OTHER) - BACKGROUND
Admit diagnosis: AMS
Admit w/ AMS, increased weakness, decreased PO intake, multiple falls
Pt here for AMS  Pt has carey and IV fluids running  Pt given 1 mg haldol IM x2 today with no change in behavior
Admit w/ AMS, increased lethargy, decreased PO intake
Admitting Diagnosis : AMS
Pt given 1 mg haldol IM x2 and 1 mg ativan IV push  Pt here for AMS

## 2023-04-20 NOTE — PROGRESS NOTE ADULT - SUBJECTIVE AND OBJECTIVE BOX
[facility]  INFECTIOUS DISEASES - Crystal Whiteside, [address], [place] [address]  Tel: [phone]     Fax: [phone]  =======================================================    MAXI FANG [id number]    Follow up: No fevers. More alert this AM., sitting in recliner Tells me he is hard of hearing and that he used to work in the [employer]. Showed pictures of his family and dog.    Allergies:  No Known Allergies      Antibiotics:  enoxaparin Injectable 40 milliGRAM(s) SubCutaneous every 24 hours  levETIRAcetam 500 milliGRAM(s) Oral two times a day  levothyroxine 25 MICROGram(s) Oral daily  QUEtiapine 200 milliGRAM(s) Oral at bedtime       REVIEW OF SYSTEMS:  unable to obtain 2/2 mental status, hard of hearing     Physical Exam:  ICU Vital Signs Last 24 Hrs  T(C): 36.4 (20 Apr 2023 11:13), Max: 36.5 (19 Apr 2023 21:45)  T(F): 97.5 (20 Apr 2023 11:13), Max: 97.7 (19 Apr 2023 21:45)  HR: 90 (20 Apr 2023 11:13) (68 - 90)  BP: 126/70 (20 Apr 2023 11:13) (122/81 - 134/78)  BP(mean): --  ABP: --  ABP(mean): --  RR: 19 (20 Apr 2023 11:13) (17 - 19)  SpO2: 96% (20 Apr 2023 11:13) (96% - 97%)    O2 Parameters below as of 20 Apr 2023 11:13  Patient On (Oxygen Delivery Method): room air      GEN: awake, NAD  HEENT: normocephalic and atraumatic.  NECK: Supple.   LUNGS: Clear to auscultation.  HEART: Regular rate and rhythm   ABDOMEN: Soft, nontender, and nondistended.    EXTREMITIES: No leg edema.  NEUROLOGIC: patient conversant, but difficult to assess given mental status and hard of hearing    Labs:  04-20    137  |  104  |  18  ----------------------------<  112<H>  3.6   |  26  |  1.10    Ca    9.6      20 Apr 2023 12:48    TPro  7.3  /  Alb  3.4  /  TBili  0.4  /  DBili  x   /  AST  34  /  ALT  53  /  AlkPhos  136<H>  04-20          LIVER FUNCTIONS - ( 20 Apr 2023 12:48 )  Alb: 3.4 g/dL / Pro: 7.3 g/dL / ALK PHOS: 136 U/L / ALT: 53 U/L / AST: 34 U/L / GGT: x             RECENT CULTURES:  04-12 @ 17:23 .CSF CSF     No growth    No polymorphonuclear cells seen  No organisms seen  by cytocentrifuge      04-10 @ 03:48 .Blood Blood-Peripheral     No Growth Final        04-10 @ 03:45 .Blood Blood-Peripheral     No Growth Final        04-08 @ 10:40 Clean Catch Clean Catch (Midstream)     No growth        04-08 @ 07:00 .Blood Blood-Peripheral     No Growth Final              All imaging and data are reviewed.

## 2023-04-20 NOTE — PROVIDER CONTACT NOTE (OTHER) - REASON
Pt slide OOB to floor on his butt while CNA attempts to stop him from coming OOB Pt came OOB to floor on his butt while CNA attempts to stop him from coming OOB

## 2023-04-20 NOTE — SWALLOW BEDSIDE ASSESSMENT ADULT - SWALLOW EVAL: RECOMMENDED DIET
Regular with Thin liquids, as tolerated.  Avoid tough meats (i.e., steak, pork/lamb chops), avoid green salads due to reduced attention to bolus
Easy to chew with Thin liquids, as tolerated
Puree with Mildly thick liquids, as tolerated

## 2023-04-20 NOTE — PHYSICAL THERAPY INITIAL EVALUATION ADULT - PATIENT/FAMILY/SIGNIFICANT OTHER GOALS STATEMENT, PT EVAL
Patient unable to state goal, wife wishes to take home as per social work
Patient unable to state goal

## 2023-04-20 NOTE — SWALLOW BEDSIDE ASSESSMENT ADULT - ASR SWALLOW RECOMMEND DIAG
Objective assessment not rx'd at this time due to no overt s/s penetration/aspiration noted.
Objective assessment not rx'd at this time due to no overt s/s penetration/aspiration noted.
Objective assessment not rx'd at this time due to overall reduced cognition, reduced command following, will follow to reassess swallow at bedside.

## 2023-04-20 NOTE — PHYSICAL THERAPY INITIAL EVALUATION ADULT - GENERAL OBSERVATIONS, REHAB EVAL
Patient received supine in bed, confused, unable to follow directions
Patient received supine in bed, confused but cooperative with physical therapy, no apparent distress

## 2023-04-20 NOTE — SWALLOW BEDSIDE ASSESSMENT ADULT - COMMENTS
Chart reviewed order received for swallow eval.  Pt received in bed A&A Ox0, reduced cognition, reduced command following, on RA, +b/l wrist restraints, +1:1 supervision at bedside, encouragement required to participate, pain scale 0/10 pre & post eval.  Swallow eval completed see below for details.  Pt left as received NAD MARJORIE Reed & Dr. Dumont notified.  Will follow.     Per charting, pt is a "68 y/o male PMH BPD (diagnosed 2005), CKD3, hypothyroidism Urinary retention and insomnia presents to the ED for altered mental status with increased weakness and decreased po intake. Admit for AMS."    Chest xray 4/8/23: "No acute pulmonary disease"    CT Head 4/8/23: "IMPRESSION: This exam is limited by motion. Grossly, no acute hemorrhage   mass or mass effect seen. Better  CT is recommended when   patient can tolerate it or sedation be given."
Chart reviewed pt seen to reassess swallow.  Pt received upright in bed A&A Ox0, reduced cognition, reduced command following, pt calm and cooperative, on RA, +1:1 supervision at bedside, pain scale 0/10 pre & post assessment.  Swallow reassessment completed see below for details.  Pt left as received NAD MARJORIE De Jesus & Dr. Dumont notified.  Pt's wife educated via phone, verbalized understanding.  Will follow x1 to check PO tolerance.     Per charting, pt is a "66 y/o male PMH BPD (diagnosed 2005), CKD3, hypothyroidism Urinary retention and insomnia presents to the ED for altered mental status with increased weakness and decreased po intake. Admit for AMS."    Chest xray 4/8/23: "No acute pulmonary disease"    CT Head 4/8/23: "IMPRESSION: This exam is limited by motion. Grossly, no acute hemorrhage   mass or mass effect seen. Better  CT is recommended when   patient can tolerate it or sedation be given."
Chart reviewed, SLP contacted by pt's wife via phone to review 4/18/23 swallow evaluation and rx's, discussed with wife, as well as, plan for reassessment today.  Wife reported pt not eating pureed foods and wishes for pt to have regular foods and thin liquids, as this was his diet PTA.  Additionally reported pt with reduced PO intake prior to this hospitalization.  Pt seen for swallow reassessment, received upright in bed A&A Ox0, on RA, reduced cognition, reduced command following, +1:1 supervision present at bedside, pain scale 0/10 pre & post eval.  Swallow reassessment completed see below for details.  Pt left as received with 1:1 supervision at bedside, NAD MARJORIE Meyers & Dr. Dumont notified.  Pt's wife contacted via phone to discuss today's assessment, rx's, and plan for SLP follow up at bedside.  Pt's wife verbalized understanding and agreement.  Will follow.     Per charting, pt is a "66 y/o male PMH BPD (diagnosed 2005), CKD3, hypothyroidism Urinary retention and insomnia presents to the ED for altered mental status with increased weakness and decreased po intake. Admit for AMS."    Chest xray 4/8/23: "No acute pulmonary disease"    CT Head 4/8/23: "IMPRESSION: This exam is limited by motion. Grossly, no acute hemorrhage   mass or mass effect seen. Better  CT is recommended when   patient can tolerate it or sedation be given."

## 2023-04-20 NOTE — PROGRESS NOTE ADULT - SUBJECTIVE AND OBJECTIVE BOX
Patient is a 67y old  Male who presents with a chief complaint of AMS (20 Apr 2023 14:14)      INTERVAL /OVERNIGHT EVENTS: mental status labile    MEDICATIONS  (STANDING):  enoxaparin Injectable 40 milliGRAM(s) SubCutaneous every 24 hours  levETIRAcetam 500 milliGRAM(s) Oral two times a day  levothyroxine 25 MICROGram(s) Oral daily  QUEtiapine 200 milliGRAM(s) Oral at bedtime  tamsulosin 0.8 milliGRAM(s) Oral at bedtime    MEDICATIONS  (PRN):      Allergies    No Known Allergies    Intolerances        REVIEW OF SYSTEMS:  unable to obtain    Vital Signs Last 24 Hrs  T(C): 36.7 (20 Apr 2023 16:10), Max: 36.7 (20 Apr 2023 16:10)  T(F): 98.1 (20 Apr 2023 16:10), Max: 98.1 (20 Apr 2023 16:10)  HR: 74 (20 Apr 2023 16:10) (68 - 90)  BP: 125/85 (20 Apr 2023 16:10) (122/81 - 134/78)  BP(mean): --  RR: 18 (20 Apr 2023 16:10) (17 - 19)  SpO2: 95% (20 Apr 2023 16:10) (95% - 97%)    Parameters below as of 20 Apr 2023 16:10  Patient On (Oxygen Delivery Method): room air        PHYSICAL EXAM:  GENERAL: NAD, well-groomed, well-developed  HEAD:  Atraumatic, Normocephalic  EYES: EOMI, PERRLA, conjunctiva and sclera clear  ENMT: No tonsillar erythema, exudates, or enlargement; Moist mucous membranes, Good dentition, No lesions  NECK: Supple, No JVD, Normal thyroid  NERVOUS SYSTEM:  Alert & awake; Motor Strength 5/5 B/L upper and lower extremities; DTRs 2+ intact and symmetric  CHEST/LUNG: Clear to auscultation bilaterally; No rales, rhonchi, wheezing, or rubs  HEART: Regular rate and rhythm; No murmurs, rubs, or gallops  ABDOMEN: Soft, Nontender, Nondistended; Bowel sounds present  EXTREMITIES:  2+ Peripheral Pulses, No clubbing, cyanosis, or edema  LYMPH: No lymphadenopathy noted  SKIN: No rashes or lesions    LABS:    20 Apr 2023 12:48    137    |  104    |  18     ----------------------------<  112    3.6     |  26     |  1.10     Ca    9.6        20 Apr 2023 12:48    TPro  7.3    /  Alb  3.4    /  TBili  0.4    /  DBili  x      /  AST  34     /  ALT  53     /  AlkPhos  136    20 Apr 2023 12:48        CAPILLARY BLOOD GLUCOSE          RADIOLOGY & ADDITIONAL TESTS:    Notes Reviewed:  [x ] YES  [ ] NO    Care Discussed with Consultants/Other Providers [x ] YES  [ ] NO

## 2023-04-21 ENCOUNTER — TRANSCRIPTION ENCOUNTER (OUTPATIENT)
Age: 68
End: 2023-04-21

## 2023-04-21 LAB
AMMONIA BLD-MCNC: 49 UMOL/L — HIGH (ref 11–32)
N-METHYL-DA RECEPTOR AB IGG BY CBA-IFA, SERUM W/RFLX TITER: SIGNIFICANT CHANGE UP

## 2023-04-21 RX ORDER — QUETIAPINE FUMARATE 200 MG/1
100 TABLET, FILM COATED ORAL AT BEDTIME
Refills: 0 | Status: DISCONTINUED | OUTPATIENT
Start: 2023-04-21 | End: 2023-04-22

## 2023-04-21 RX ORDER — BUPROPION HYDROCHLORIDE 150 MG/1
150 TABLET, EXTENDED RELEASE ORAL DAILY
Refills: 0 | Status: DISCONTINUED | OUTPATIENT
Start: 2023-04-21 | End: 2023-04-21

## 2023-04-21 RX ADMIN — BUPROPION HYDROCHLORIDE 150 MILLIGRAM(S): 150 TABLET, EXTENDED RELEASE ORAL at 17:43

## 2023-04-21 RX ADMIN — LEVETIRACETAM 500 MILLIGRAM(S): 250 TABLET, FILM COATED ORAL at 17:42

## 2023-04-21 RX ADMIN — QUETIAPINE FUMARATE 100 MILLIGRAM(S): 200 TABLET, FILM COATED ORAL at 22:08

## 2023-04-21 RX ADMIN — ENOXAPARIN SODIUM 40 MILLIGRAM(S): 100 INJECTION SUBCUTANEOUS at 23:29

## 2023-04-21 RX ADMIN — TAMSULOSIN HYDROCHLORIDE 0.8 MILLIGRAM(S): 0.4 CAPSULE ORAL at 22:08

## 2023-04-21 NOTE — DISCHARGE NOTE PROVIDER - NSDCFUSCHEDAPPT_GEN_ALL_CORE_FT
Shane Goodman  [facility]  [facility] [address]  Scheduled Appointment: 05/03/2023

## 2023-04-21 NOTE — DISCHARGE NOTE PROVIDER - CARE PROVIDER_API CALL
Jarvis Contreras  NEUROLOGY  [address] [place] [address]  Phone: [phone]  Fax: [phone]  Follow Up Time:    Jarvis Contreras  NEUROLOGY  [address] [place] [address]  Phone: [phone]  Fax: [phone]  Follow Up Time:     HENNA FRENCH  Internal Medicine  Phone: [phone]  Fax: [phone]  Follow Up Time:     Leandro Floyd)  Psychiatry  [address] [facility] [place] [address]  Phone: [phone]  Fax: [phone]  Follow Up Time:     Shahrzad Andersen)  ChildAdolescent Psychiatry; Psychiatry  Follow Up Time:

## 2023-04-21 NOTE — PROGRESS NOTE ADULT - SUBJECTIVE AND OBJECTIVE BOX
Neurology follow up note    MAXI [id number]      Interval History:    Patient feels ok no new complaints.    Allergies    No Known Allergies    Intolerances        MEDICATIONS    enoxaparin Injectable 40 milliGRAM(s) SubCutaneous every 24 hours  levETIRAcetam 500 milliGRAM(s) Oral two times a day  levothyroxine 25 MICROGram(s) Oral daily  QUEtiapine 200 milliGRAM(s) Oral at bedtime  tamsulosin 0.8 milliGRAM(s) Oral at bedtime              Vital Signs Last 24 Hrs  T(C): 36.4 (21 Apr 2023 05:48), Max: 36.7 (20 Apr 2023 16:10)  T(F): 97.5 (21 Apr 2023 05:48), Max: 98.1 (20 Apr 2023 16:10)  HR: 75 (21 Apr 2023 05:48) (74 - 90)  BP: 136/82 (21 Apr 2023 05:48) (125/85 - 136/82)  BP(mean): --  RR: 17 (21 Apr 2023 05:48) (17 - 19)  SpO2: 97% (21 Apr 2023 05:48) (95% - 97%)    Parameters below as of 21 Apr 2023 05:48  Patient On (Oxygen Delivery Method): room air      REVIEW OF SYSTEMS: Limited or unable to obtain secondary to patient's poor mental status would answer ok or alright to any question ask to him       On Neurological Examination:    Mental Status - Patient is awake alert  did not name objects     on and off following commands    Speech - says few words      Cranial Nerves -   extraocular eye movements intact.   intact bilateral NLF    Motor Exam -  With stimuli positive movement of all 4 extremities    Muscle tone - is normal all over.  No asymmetry is seen.      Sensory    Bilateral intact to light touch    GENERAL Exam: Nontoxic , No Acute Distress   	  HEENT:  normocephalic, atraumatic  		  LUNGS:  Decreased bilaterally  	  HEART: Normal S1S2   No murmur RRR        	  GI/ ABDOMEN:  Soft  Non tender    EXTREMITIES:   No Edema  No Clubbing  No Cyanosis     SKIN: Normal  No Ecchymosis                      LABS:      04-20    137  |  104  |  18  ----------------------------<  112<H>  3.6   |  26  |  1.10    Ca    9.6      20 Apr 2023 12:48    TPro  7.3  /  Alb  3.4  /  TBili  0.4  /  DBili  x   /  AST  34  /  ALT  53  /  AlkPhos  136<H>  04-20    Hemoglobin A1C:     LIVER FUNCTIONS - ( 20 Apr 2023 12:48 )  Alb: 3.4 g/dL / Pro: 7.3 g/dL / ALK PHOS: 136 U/L / ALT: 53 U/L / AST: 34 U/L / GGT: x           Vitamin B12         RADIOLOGY    ASSESSMENT AND PLAN   AMS possible underlying psych issues confusion   psych follow up adjust medications as needed  possible underlying seizures on keppra  hypothyroidism synthroid   autoimmune work up panel was negative   h/o of poor sleep anitha as per spouse   spoke to spouse janis   4/22 mental status will wax and wean as per spouse     Greater than 45 minutes of time was spent with the patient, plan of care, reviewing data, with greater than  50% of the visit was spent counseling and/or coordinating care with multidisciplinary healthcare team   Neurology follow up note    MAXI [id number]      Interval History:    Patient feels ok no new complaints.    Allergies    No Known Allergies    Intolerances        MEDICATIONS    enoxaparin Injectable 40 milliGRAM(s) SubCutaneous every 24 hours  levETIRAcetam 500 milliGRAM(s) Oral two times a day  levothyroxine 25 MICROGram(s) Oral daily  QUEtiapine 200 milliGRAM(s) Oral at bedtime  tamsulosin 0.8 milliGRAM(s) Oral at bedtime              Vital Signs Last 24 Hrs  T(C): 36.4 (21 Apr 2023 05:48), Max: 36.7 (20 Apr 2023 16:10)  T(F): 97.5 (21 Apr 2023 05:48), Max: 98.1 (20 Apr 2023 16:10)  HR: 75 (21 Apr 2023 05:48) (74 - 90)  BP: 136/82 (21 Apr 2023 05:48) (125/85 - 136/82)  BP(mean): --  RR: 17 (21 Apr 2023 05:48) (17 - 19)  SpO2: 97% (21 Apr 2023 05:48) (95% - 97%)    Parameters below as of 21 Apr 2023 05:48  Patient On (Oxygen Delivery Method): room air      REVIEW OF SYSTEMS: Limited or unable to obtain secondary to patient's poor mental status would answer ok or alright to any question ask to him       On Neurological Examination:    Mental Status - Patient is awake alert  did not name objects     on and off following commands    Speech - says few words      Cranial Nerves -   extraocular eye movements intact.   intact bilateral NLF    Motor Exam -  With stimuli positive movement of all 4 extremities    Muscle tone - is normal all over.  No asymmetry is seen.      Sensory    Bilateral intact to light touch    GENERAL Exam: Nontoxic , No Acute Distress   	  HEENT:  normocephalic, atraumatic  		  LUNGS:  Decreased bilaterally  	  HEART: Normal S1S2   No murmur RRR        	  GI/ ABDOMEN:  Soft  Non tender    EXTREMITIES:   No Edema  No Clubbing  No Cyanosis     SKIN: Normal  No Ecchymosis                      LABS:      04-20    137  |  104  |  18  ----------------------------<  112<H>  3.6   |  26  |  1.10    Ca    9.6      20 Apr 2023 12:48    TPro  7.3  /  Alb  3.4  /  TBili  0.4  /  DBili  x   /  AST  34  /  ALT  53  /  AlkPhos  136<H>  04-20    Hemoglobin A1C:     LIVER FUNCTIONS - ( 20 Apr 2023 12:48 )  Alb: 3.4 g/dL / Pro: 7.3 g/dL / ALK PHOS: 136 U/L / ALT: 53 U/L / AST: 34 U/L / GGT: x           Vitamin B12         RADIOLOGY    ASSESSMENT AND PLAN   AMS possible underlying psych issues confusion   psych follow up adjust medications as needed  possible underlying seizures on keppra  hypothyroidism synthroid   autoimmune work up panel was negative   h/o of poor sleep anitha as per spouse   spoke to spouse janis   4/22 mental status will wax and wean as per spouse  also today spoke to son in detail     Greater than 45 minutes of time was spent with the patient, plan of care, reviewing data, with greater than  50% of the visit was spent counseling and/or coordinating care with multidisciplinary healthcare team

## 2023-04-21 NOTE — PROGRESS NOTE ADULT - NSPROGADDITIONALINFOA_GEN_ALL_CORE
? in patient psych pending psych reeval
Spoke to wife at length. She continues to refuse MRI and LP.  >70 min
>70 min

## 2023-04-21 NOTE — PROGRESS NOTE ADULT - SUBJECTIVE AND OBJECTIVE BOX
Patient is a 67y old  Male who presents with a chief complaint of AMS (21 Apr 2023 10:50)      INTERVAL /OVERNIGHT EVENTS: lethargic today    MEDICATIONS  (STANDING):  buPROPion XL (24-Hour) . 150 milliGRAM(s) Oral daily  enoxaparin Injectable 40 milliGRAM(s) SubCutaneous every 24 hours  levETIRAcetam 500 milliGRAM(s) Oral two times a day  levothyroxine 25 MICROGram(s) Oral daily  QUEtiapine 100 milliGRAM(s) Oral at bedtime  tamsulosin 0.8 milliGRAM(s) Oral at bedtime    MEDICATIONS  (PRN):      Allergies    No Known Allergies    Intolerances        REVIEW OF SYSTEMS:  unable to obtain    Vital Signs Last 24 Hrs  T(C): 36.4 (21 Apr 2023 11:56), Max: 36.7 (20 Apr 2023 16:10)  T(F): 97.6 (21 Apr 2023 11:56), Max: 98.1 (20 Apr 2023 16:10)  HR: 76 (21 Apr 2023 11:56) (74 - 77)  BP: 142/76 (21 Apr 2023 11:56) (125/85 - 142/76)  BP(mean): --  RR: 18 (21 Apr 2023 11:56) (17 - 18)  SpO2: 99% (21 Apr 2023 11:56) (95% - 99%)    Parameters below as of 21 Apr 2023 11:56  Patient On (Oxygen Delivery Method): room air        PHYSICAL EXAM:  GENERAL: NAD, well-groomed, well-developed  HEAD:  Atraumatic, Normocephalic  EYES: EOMI, PERRLA, conjunctiva and sclera clear  ENMT: No tonsillar erythema, exudates, or enlargement; Moist mucous membranes, Good dentition, No lesions  NECK: Supple, No JVD, Normal thyroid  NERVOUS SYSTEM:  Alert and awake; Motor Strength 5/5 B/L upper and lower extremities; DTRs 2+ intact and symmetric  CHEST/LUNG: Clear to auscultation bilaterally; No rales, rhonchi, wheezing, or rubs  HEART: Regular rate and rhythm; No murmurs, rubs, or gallops  ABDOMEN: Soft, Nontender, Nondistended; Bowel sounds present  EXTREMITIES:  2+ Peripheral Pulses, No clubbing, cyanosis, or edema  LYMPH: No lymphadenopathy noted  SKIN: No rashes or lesions    LABS:      Ca    9.6        20 Apr 2023 12:48          CAPILLARY BLOOD GLUCOSE          RADIOLOGY & ADDITIONAL TESTS:    Notes Reviewed:  [x ] YES  [ ] NO    Care Discussed with Consultants/Other Providers [x ] YES  [ ] NO

## 2023-04-21 NOTE — PROGRESS NOTE ADULT - PROBLEM SELECTOR PLAN 1
AMS in the setting of progressive dementia vs. medications vs. electrolyte imbalance  - Abnormal EEG of unclear significance -- started on empiric Keppra   - now on seroquel  - S/P LP no evidence of encephalitis  - ID consult with dr. woody appreciated  - F/u UCx, BCx x2 -- NTD  - Neuro follow up  - Psych (Dr. Floyd) consulted, f/u recs

## 2023-04-21 NOTE — DISCHARGE NOTE PROVIDER - PROVIDER TOKENS
PROVIDER:[TOKEN:[[id number]]] PROVIDER:[TOKEN:[[id number]]],PROVIDER:[TOKEN:[[id number]]],PROVIDER:[TOKEN:[[id number]]],PROVIDER:[TOKEN:[[id number]]]

## 2023-04-21 NOTE — DISCHARGE NOTE PROVIDER - NSDCMRMEDTOKEN_GEN_ALL_CORE_FT
benztropine 1 mg oral tablet: 1 orally once a day  buPROPion 150 mg/24 hours (XL) oral tablet, extended release: 1 orally once a day  levothyroxine 25 mcg (0.025 mg) oral capsule: 1 orally once a day  lithium 300 mg oral capsule: 1 orally once a day (in the morning)  lithium 600 mg oral capsule: 1 orally once a day (at bedtime)  SEROquel 200 mg oral tablet: 1 orally once a day  tamsulosin 0.4 mg oral capsule: 1 orally 2 times a day   acetaminophen 325 mg oral tablet: 2 tab(s) orally every 6 hours As needed Mild Pain (1 - 3)  levETIRAcetam 500 mg oral tablet: 1 tab(s) orally 2 times a day  levothyroxine 25 mcg (0.025 mg) oral capsule: 1 orally once a day  melatonin 5 mg oral tablet: 1 tab(s) orally once a day (at bedtime)  SEROquel 200 mg oral tablet: 1 orally once a day  tamsulosin 0.4 mg oral capsule: 1 orally 2 times a day

## 2023-04-21 NOTE — CASE MANAGEMENT PROGRESS NOTE - NSCMPROGRESSNOTE_GEN_ALL_CORE
CM consult noted for Home PT, disposition unclear at this time. CM will continue to follow and will arrange Home PT when dispositon is confirmed.

## 2023-04-21 NOTE — DISCHARGE NOTE PROVIDER - DETAILS OF MALNUTRITION DIAGNOSIS/DIAGNOSES
This patient has been assessed with a concern for Malnutrition and was treated during this hospitalization for the following Nutrition diagnosis/diagnoses:     -  04/09/2023: Severe protein-calorie malnutrition

## 2023-04-21 NOTE — CASE MANAGEMENT PROGRESS NOTE - NSCMPROGRESSNOTE_GEN_ALL_CORE
Patient remains in a 1:1. As per MD, consideration for inpatient psych for medication adjustment.  CM remains available Patient remains in a 1:1. As per MD, consideration for inpatient psych for medication adjustment and no weekend discharge.  CM remains available

## 2023-04-21 NOTE — DISCHARGE NOTE PROVIDER - NSDCCPCAREPLAN_GEN_ALL_CORE_FT
PRINCIPAL DISCHARGE DIAGNOSIS  Diagnosis: AMS (altered mental status)  Assessment and Plan of Treatment: follow up with neuro MD Dr. rebolledo      SECONDARY DISCHARGE DIAGNOSES  Diagnosis: Frequent falls  Assessment and Plan of Treatment:     Diagnosis: Chronic renal disease  Assessment and Plan of Treatment:

## 2023-04-21 NOTE — DISCHARGE NOTE PROVIDER - NSDCQMAMI_CARD_ALL_CORE
No Dfsp Histology Text: Present throughout the dermis and extending into the subcutis are fascicles of monomorphic spindled cells with a storiform pattern. The spindled cells have an infiltrative growth pattern with entrapment of adipocytes. Individual cells are hyperchromatic with elongated nuclei. Scattered mitoses\\nare present.

## 2023-04-21 NOTE — DISCHARGE NOTE PROVIDER - NSDCQMPCI_CARD_ALL_CORE
Refill request received for gabapentin 300mg TID  Last office visit: 12/31/2019  Next office visit: 4/2/2020  Last refill: 10/17/2019  Refill request approved and sent to preferred pharmacy.  
No

## 2023-04-21 NOTE — PROGRESS NOTE ADULT - PROBLEM SELECTOR PLAN 6
Patient recently diagnosed w/ CKD3 at [facility]  - Cr. 1.9 on admission, baseline unknown  - Continue to monitor with daily BMP  - Avoid nephrotoxic agents.  - Renal follow up

## 2023-04-21 NOTE — DISCHARGE NOTE PROVIDER - HOSPITAL COURSE
admitted for AMS  likely toxic metabolic encephalopathy  underwent LP  infectious encephalitis ruled out  lithium toxicity resolved  possible seizures on EEG, now on kepra per neuro  DC after neuro and psych clearance

## 2023-04-22 PROCEDURE — 99231 SBSQ HOSP IP/OBS SF/LOW 25: CPT

## 2023-04-22 RX ORDER — BENZTROPINE MESYLATE 1 MG
1 TABLET ORAL
Refills: 0 | DISCHARGE

## 2023-04-22 RX ORDER — LITHIUM CARBONATE 300 MG/1
1 TABLET, EXTENDED RELEASE ORAL
Refills: 0 | DISCHARGE

## 2023-04-22 RX ORDER — QUETIAPINE FUMARATE 200 MG/1
200 TABLET, FILM COATED ORAL AT BEDTIME
Refills: 0 | Status: DISCONTINUED | OUTPATIENT
Start: 2023-04-22 | End: 2023-04-24

## 2023-04-22 RX ORDER — LANOLIN ALCOHOL/MO/W.PET/CERES
1 CREAM (GRAM) TOPICAL
Qty: 0 | Refills: 0 | DISCHARGE
Start: 2023-04-22

## 2023-04-22 RX ORDER — LANOLIN ALCOHOL/MO/W.PET/CERES
5 CREAM (GRAM) TOPICAL AT BEDTIME
Refills: 0 | Status: DISCONTINUED | OUTPATIENT
Start: 2023-04-22 | End: 2023-04-24

## 2023-04-22 RX ORDER — BUPROPION HYDROCHLORIDE 150 MG/1
1 TABLET, EXTENDED RELEASE ORAL
Refills: 0 | DISCHARGE

## 2023-04-22 RX ORDER — LEVETIRACETAM 250 MG/1
500 TABLET, FILM COATED ORAL
Refills: 0 | Status: DISCONTINUED | OUTPATIENT
Start: 2023-04-22 | End: 2023-04-24

## 2023-04-22 RX ADMIN — LEVETIRACETAM 500 MILLIGRAM(S): 250 TABLET, FILM COATED ORAL at 05:44

## 2023-04-22 RX ADMIN — TAMSULOSIN HYDROCHLORIDE 0.8 MILLIGRAM(S): 0.4 CAPSULE ORAL at 21:03

## 2023-04-22 RX ADMIN — LEVETIRACETAM 500 MILLIGRAM(S): 250 TABLET, FILM COATED ORAL at 17:38

## 2023-04-22 RX ADMIN — Medication 25 MICROGRAM(S): at 05:44

## 2023-04-22 RX ADMIN — Medication 5 MILLIGRAM(S): at 21:02

## 2023-04-22 RX ADMIN — QUETIAPINE FUMARATE 200 MILLIGRAM(S): 200 TABLET, FILM COATED ORAL at 21:02

## 2023-04-22 NOTE — PROGRESS NOTE ADULT - SUBJECTIVE AND OBJECTIVE BOX
Patient is a 67y old  Male who presents with a chief complaint of AMS (22 Apr 2023 09:40)      INTERVAL /OVERNIGHT EVENTS: alert awake less confused    MEDICATIONS  (STANDING):  enoxaparin Injectable 40 milliGRAM(s) SubCutaneous every 24 hours  levETIRAcetam 500 milliGRAM(s) Oral two times a day  levothyroxine 25 MICROGram(s) Oral daily  melatonin 5 milliGRAM(s) Oral at bedtime  QUEtiapine 200 milliGRAM(s) Oral at bedtime  tamsulosin 0.8 milliGRAM(s) Oral at bedtime    MEDICATIONS  (PRN):      Allergies    No Known Allergies    Intolerances        REVIEW OF SYSTEMS:  denies    Vital Signs Last 24 Hrs  T(C): 37.1 (22 Apr 2023 11:47), Max: 37.1 (22 Apr 2023 11:47)  T(F): 98.7 (22 Apr 2023 11:47), Max: 98.7 (22 Apr 2023 11:47)  HR: 89 (22 Apr 2023 11:47) (73 - 89)  BP: 109/73 (22 Apr 2023 11:47) (108/69 - 109/73)  BP(mean): --  RR: 16 (22 Apr 2023 11:47) (16 - 17)  SpO2: 96% (22 Apr 2023 11:47) (96% - 96%)    Parameters below as of 22 Apr 2023 11:47  Patient On (Oxygen Delivery Method): room air        PHYSICAL EXAM:  GENERAL: NAD, well-groomed, well-developed  HEAD:  Atraumatic, Normocephalic  EYES: EOMI, PERRLA, conjunctiva and sclera clear  ENMT: No tonsillar erythema, exudates, or enlargement; Moist mucous membranes, Good dentition, No lesions  NECK: Supple, No JVD, Normal thyroid  NERVOUS SYSTEM:  Alert & Oriented X3, Good concentration; Motor Strength 5/5 B/L upper and lower extremities; DTRs 2+ intact and symmetric  CHEST/LUNG: Clear to auscultation bilaterally; No rales, rhonchi, wheezing, or rubs  HEART: Regular rate and rhythm; No murmurs, rubs, or gallops  ABDOMEN: Soft, Nontender, Nondistended; Bowel sounds present  EXTREMITIES:  2+ Peripheral Pulses, No clubbing, cyanosis, or edema  LYMPH: No lymphadenopathy noted  SKIN: No rashes or lesions    LABS:              CAPILLARY BLOOD GLUCOSE          RADIOLOGY & ADDITIONAL TESTS:    Notes Reviewed:  [x ] YES  [ ] NO    Care Discussed with Consultants/Other Providers [x ] YES  [ ] NO

## 2023-04-22 NOTE — PROGRESS NOTE ADULT - PROBLEM SELECTOR PLAN 6
Patient recently diagnosed w/ CKD3 at [facility]  - Cr. 1.9 on admission, baseline unknown  - Continue to monitor with daily BMP  - Avoid nephrotoxic agents.  - Renal follow up

## 2023-04-22 NOTE — PROGRESS NOTE ADULT - PROBLEM SELECTOR PLAN 2
Chronic, diagnosed 2005.  - Recent admission to [facility] for manic episode  - Hold lithium 2/2 litihium toxicity  - Psych (Dr. Floyd) consulted, appreciate recs  restart seroquel

## 2023-04-22 NOTE — PROGRESS NOTE ADULT - SUBJECTIVE AND OBJECTIVE BOX
Neurology follow up note    MAXI [id number]      Interval History:    Patient feels ok seen with staff     Allergies    No Known Allergies    Intolerances        MEDICATIONS    enoxaparin Injectable 40 milliGRAM(s) SubCutaneous every 24 hours  levETIRAcetam 500 milliGRAM(s) Oral two times a day  levothyroxine 25 MICROGram(s) Oral daily  melatonin 5 milliGRAM(s) Oral at bedtime  QUEtiapine 200 milliGRAM(s) Oral at bedtime  tamsulosin 0.8 milliGRAM(s) Oral at bedtime              Vital Signs Last 24 Hrs  T(C): 36.5 (22 Apr 2023 05:30), Max: 36.5 (22 Apr 2023 05:30)  T(F): 97.7 (22 Apr 2023 05:30), Max: 97.7 (22 Apr 2023 05:30)  HR: 73 (22 Apr 2023 05:30) (73 - 77)  BP: 108/69 (22 Apr 2023 05:30) (108/69 - 142/76)  BP(mean): --  RR: 17 (22 Apr 2023 05:30) (17 - 18)  SpO2: 96% (22 Apr 2023 05:30) (96% - 99%)    Parameters below as of 22 Apr 2023 05:30  Patient On (Oxygen Delivery Method): room air      REVIEW OF SYSTEMS: Limited or unable to obtain secondary to patient's poor mental status would answer ok or alright to any question ask to him feels ok       On Neurological Examination:    Mental Status - Patient is awake alert  did name objects   was able to time today 15 till 9 which was correct   on and off following commands today did follow a complex command     Speech - says few words      Cranial Nerves -   extraocular eye movements intact.   intact bilateral NLF    Motor Exam -  With stimuli positive movement of all 4 extremities    Muscle tone - is normal all over.  No asymmetry is seen.      Sensory    Bilateral intact to light touch    GENERAL Exam: Nontoxic , No Acute Distress   	  HEENT:  normocephalic, atraumatic  		  LUNGS:  Decreased bilaterally  	  HEART: Normal S1S2   No murmur RRR        	  GI/ ABDOMEN:  Soft  Non tender    EXTREMITIES:   No Edema  No Clubbing  No Cyanosis     SKIN: Normal  No Ecchymosis                 LABS:      04-20    137  |  104  |  18  ----------------------------<  112<H>  3.6   |  26  |  1.10    Ca    9.6      20 Apr 2023 12:48    TPro  7.3  /  Alb  3.4  /  TBili  0.4  /  DBili  x   /  AST  34  /  ALT  53  /  AlkPhos  136<H>  04-20    Hemoglobin A1C:     LIVER FUNCTIONS - ( 20 Apr 2023 12:48 )  Alb: 3.4 g/dL / Pro: 7.3 g/dL / ALK PHOS: 136 U/L / ALT: 53 U/L / AST: 34 U/L / GGT: x           Vitamin B12         RADIOLOGY  ASSESSMENT AND PLAN   AMS possible underlying psych issues confusion   psych follow up adjust medications as needed  possible underlying seizures on keppra  hypothyroidism synthroid   autoimmune work up panel was negative   h/o of poor sleep anitha as per spouse   spoke to spouse janis   4/22 mental status will wax and wean as per spouse  also today spoke to son in detail   appears better mental status today 4/22  Greater than 45 minutes of time was spent with the patient, plan of care, reviewing data, with greater than  50% of the visit was spent counseling and/or coordinating care with multidisciplinary healthcare team

## 2023-04-23 RX ORDER — ACETAMINOPHEN 500 MG
650 TABLET ORAL EVERY 6 HOURS
Refills: 0 | Status: DISCONTINUED | OUTPATIENT
Start: 2023-04-23 | End: 2023-04-24

## 2023-04-23 RX ADMIN — LEVETIRACETAM 500 MILLIGRAM(S): 250 TABLET, FILM COATED ORAL at 05:09

## 2023-04-23 RX ADMIN — Medication 650 MILLIGRAM(S): at 15:30

## 2023-04-23 RX ADMIN — QUETIAPINE FUMARATE 200 MILLIGRAM(S): 200 TABLET, FILM COATED ORAL at 22:16

## 2023-04-23 RX ADMIN — Medication 650 MILLIGRAM(S): at 15:02

## 2023-04-23 RX ADMIN — ENOXAPARIN SODIUM 40 MILLIGRAM(S): 100 INJECTION SUBCUTANEOUS at 00:10

## 2023-04-23 RX ADMIN — LEVETIRACETAM 500 MILLIGRAM(S): 250 TABLET, FILM COATED ORAL at 17:36

## 2023-04-23 RX ADMIN — TAMSULOSIN HYDROCHLORIDE 0.8 MILLIGRAM(S): 0.4 CAPSULE ORAL at 22:16

## 2023-04-23 RX ADMIN — Medication 5 MILLIGRAM(S): at 22:16

## 2023-04-23 RX ADMIN — Medication 25 MICROGRAM(S): at 05:10

## 2023-04-23 NOTE — PROGRESS NOTE ADULT - PROBLEM SELECTOR PLAN 3
Hold lithium   IVF  Monitor levels -- improving  Renal f/u
Hold lithium   Monitor levels -- improving  Replete lytes  Renal follow up  resolved
Hold lithium   Monitor levels -- improving  Replete lytes  Renal follow up
Hold lithium   Monitor levels -- improving  Replete lytes  Renal follow up
Hold lithium   IVF  Monitor levels -- improving  Renal follow up
Hold lithium   Monitor levels -- improving  Replete lytes  Renal follow up  resolved
Hold lithium   IVF  Monitor levels -- improving  Renal f/u
Hold lithium   IVF  Monitor levels -- improving  Renal follow up
Hold lithium   IVF  Monitor levels -- improving  Renal f/u
Hold lithium   Monitor levels -- improving  Replete lytes  Renal follow up
Hold lithium   Monitor levels -- improving  Replete lytes  Renal follow up
Hold lithium   IVF  Monitor levels -- improving  Renal follow up
Hold lithium   Monitor levels -- improving  Replete lytes  Renal follow up  resolved
Hold lithium   IVF  Monitor levels -- improving  Renal f/u
Hold lithium   Monitor levels -- improving  Replete lytes  Renal follow up

## 2023-04-23 NOTE — PROGRESS NOTE ADULT - PROBLEM SELECTOR PROBLEM 7
Hypothyroidism Opzelura Counseling:  I discussed with the patient the risks of Opzelura including but not limited to nasopharngitis, bronchitis, ear infection, eosinophila, hives, diarrhea, folliculitis, tonsillitis, and rhinorrhea.  Taken orally, this medication has been linked to serious infections; higher rate of mortality; malignancy and lymphoproliferative disorders; major adverse cardiovascular events; thrombosis; thrombocytopenia, anemia, and neutropenia; and lipid elevations.

## 2023-04-23 NOTE — PROGRESS NOTE ADULT - PROBLEM SELECTOR PROBLEM 3
Lithium toxicity

## 2023-04-23 NOTE — PROGRESS NOTE ADULT - PROBLEM SELECTOR PLAN 6
Patient recently diagnosed w/ CKD3 at [facility]  - Cr. 1.9 on admission, baseline unknown  - Continue to monitor with daily BMP  - Avoid nephrotoxic agents.  - Renal follow up

## 2023-04-23 NOTE — PROGRESS NOTE ADULT - PROBLEM SELECTOR PROBLEM 2
Bipolar disorder

## 2023-04-23 NOTE — PROGRESS NOTE ADULT - SUBJECTIVE AND OBJECTIVE BOX
Patient is a 67y old  Male who presents with a chief complaint of AMS (23 Apr 2023 07:59)      INTERVAL /OVERNIGHT EVENTS: MS labile    MEDICATIONS  (STANDING):  enoxaparin Injectable 40 milliGRAM(s) SubCutaneous every 24 hours  levETIRAcetam 500 milliGRAM(s) Oral two times a day  levothyroxine 25 MICROGram(s) Oral daily  melatonin 5 milliGRAM(s) Oral at bedtime  QUEtiapine 200 milliGRAM(s) Oral at bedtime  tamsulosin 0.8 milliGRAM(s) Oral at bedtime    MEDICATIONS  (PRN):  acetaminophen     Tablet .. 650 milliGRAM(s) Oral every 6 hours PRN Mild Pain (1 - 3)      Allergies    No Known Allergies    Intolerances        REVIEW OF SYSTEMS:  denies    Vital Signs Last 24 Hrs  T(C): 37.3 (23 Apr 2023 13:17), Max: 37.3 (23 Apr 2023 13:17)  T(F): 99.1 (23 Apr 2023 13:17), Max: 99.1 (23 Apr 2023 13:17)  HR: 105 (23 Apr 2023 13:17) (70 - 105)  BP: 106/70 (23 Apr 2023 13:17) (106/70 - 124/75)  BP(mean): --  RR: 18 (23 Apr 2023 13:17) (17 - 18)  SpO2: 97% (23 Apr 2023 13:17) (95% - 97%)    Parameters below as of 23 Apr 2023 13:17  Patient On (Oxygen Delivery Method): room air        PHYSICAL EXAM:  GENERAL: NAD, well-groomed, well-developed  HEAD:  Atraumatic, Normocephalic  EYES: EOMI, PERRLA, conjunctiva and sclera clear  ENMT: No tonsillar erythema, exudates, or enlargement; Moist mucous membranes, Good dentition, No lesions  NECK: Supple, No JVD, Normal thyroid  NERVOUS SYSTEM:  Alert & Oriented X3, Good concentration; Motor Strength 5/5 B/L upper and lower extremities; DTRs 2+ intact and symmetric  CHEST/LUNG: Clear to auscultation bilaterally; No rales, rhonchi, wheezing, or rubs  HEART: Regular rate and rhythm; No murmurs, rubs, or gallops  ABDOMEN: Soft, Nontender, Nondistended; Bowel sounds present  EXTREMITIES:  2+ Peripheral Pulses, No clubbing, cyanosis, or edema  LYMPH: No lymphadenopathy noted  SKIN: No rashes or lesions    LABS:              CAPILLARY BLOOD GLUCOSE          RADIOLOGY & ADDITIONAL TESTS:    Notes Reviewed:  [x ] YES  [ ] NO    Care Discussed with Consultants/Other Providers [x ] YES  [ ] NO

## 2023-04-23 NOTE — PROGRESS NOTE ADULT - SUBJECTIVE AND OBJECTIVE BOX
Neurology follow up note    MAXI [id number]      Interval History:    Patient feels ok no new complaints.    Allergies    No Known Allergies    Intolerances        MEDICATIONS    enoxaparin Injectable 40 milliGRAM(s) SubCutaneous every 24 hours  levETIRAcetam 500 milliGRAM(s) Oral two times a day  levothyroxine 25 MICROGram(s) Oral daily  melatonin 5 milliGRAM(s) Oral at bedtime  QUEtiapine 200 milliGRAM(s) Oral at bedtime  tamsulosin 0.8 milliGRAM(s) Oral at bedtime              Vital Signs Last 24 Hrs  T(C): 36.6 (23 Apr 2023 05:18), Max: 37.1 (22 Apr 2023 11:47)  T(F): 97.8 (23 Apr 2023 05:18), Max: 98.7 (22 Apr 2023 11:47)  HR: 70 (23 Apr 2023 05:18) (70 - 89)  BP: 124/75 (23 Apr 2023 05:18) (109/73 - 124/75)  BP(mean): --  RR: 18 (23 Apr 2023 05:18) (16 - 18)  SpO2: 97% (23 Apr 2023 05:18) (95% - 97%)    Parameters below as of 23 Apr 2023 05:18  Patient On (Oxygen Delivery Method): room air    REVIEW OF SYSTEMS: Limited or unable to obtain secondary to patient's poor mental status would answer ok or alright to any question ask to him feels ok       On Neurological Examination:    Mental Status - Patient is awake alert  did name objects   was able to time today 15 till 9 which was correct   on and off following commands today did follow a complex command     Speech - says few words      Cranial Nerves -   extraocular eye movements intact.   intact bilateral NLF    Motor Exam -  With stimuli positive movement of all 4 extremities    Muscle tone - is normal all over.  No asymmetry is seen.      Sensory    Bilateral intact to light touch    GENERAL Exam: Nontoxic , No Acute Distress   	  HEENT:  normocephalic, atraumatic  		  LUNGS:  Decreased bilaterally  	  HEART: Normal S1S2   No murmur RRR        	  GI/ ABDOMEN:  Soft  Non tender    EXTREMITIES:   No Edema  No Clubbing  No Cyanosis     SKIN: Normal  No Ecchymosis         LABS:            Hemoglobin A1C:       Vitamin B12         RADIOLOGY    ASSESSMENT AND PLAN   AMS possible underlying psych issues confusion   psych follow up adjust medications as needed  possible underlying seizures on keppra  hypothyroidism synthroid   autoimmune work up panel was negative   h/o of poor sleep anitha as per spouse   spoke to spouse janis   4/22 mental status will wax and wean as per spouse  also today spoke to son in detail   appears better mental status today 4/22  Greater than 42 minutes of time was spent with the patient, plan of care, reviewing data, with greater than  50% of the visit was spent counseling and/or coordinating care with multidisciplinary healthcare team     Neurology follow up note    MAXI [id number]      Interval History:    Patient feels ok no new complaints.    Allergies    No Known Allergies    Intolerances        MEDICATIONS    enoxaparin Injectable 40 milliGRAM(s) SubCutaneous every 24 hours  levETIRAcetam 500 milliGRAM(s) Oral two times a day  levothyroxine 25 MICROGram(s) Oral daily  melatonin 5 milliGRAM(s) Oral at bedtime  QUEtiapine 200 milliGRAM(s) Oral at bedtime  tamsulosin 0.8 milliGRAM(s) Oral at bedtime              Vital Signs Last 24 Hrs  T(C): 36.6 (23 Apr 2023 05:18), Max: 37.1 (22 Apr 2023 11:47)  T(F): 97.8 (23 Apr 2023 05:18), Max: 98.7 (22 Apr 2023 11:47)  HR: 70 (23 Apr 2023 05:18) (70 - 89)  BP: 124/75 (23 Apr 2023 05:18) (109/73 - 124/75)  BP(mean): --  RR: 18 (23 Apr 2023 05:18) (16 - 18)  SpO2: 97% (23 Apr 2023 05:18) (95% - 97%)    Parameters below as of 23 Apr 2023 05:18  Patient On (Oxygen Delivery Method): room air    REVIEW OF SYSTEMS: Limited or unable to obtain secondary to patient's poor mental status would answer ok or alright to any question ask to him feels ok       On Neurological Examination:    Mental Status - Patient is awake alert  did name objects   was able to time today 15 till 9 which was correct   on and off following commands today did follow a complex command     Speech - says few words      Cranial Nerves -   extraocular eye movements intact.   intact bilateral NLF    Motor Exam -  With stimuli positive movement of all 4 extremities    Muscle tone - is normal all over.  No asymmetry is seen.      Sensory    Bilateral intact to light touch    GENERAL Exam: Nontoxic , No Acute Distress   	  HEENT:  normocephalic, atraumatic  		  LUNGS:  Decreased bilaterally  	  HEART: Normal S1S2   No murmur RRR        	  GI/ ABDOMEN:  Soft  Non tender    EXTREMITIES:   No Edema  No Clubbing  No Cyanosis     SKIN: Normal  No Ecchymosis         LABS:            Hemoglobin A1C:       Vitamin B12         RADIOLOGY    ASSESSMENT AND PLAN   AMS possible underlying psych issues confusion   psych follow up adjust medications as needed  possible underlying seizures on keppra  hypothyroidism synthroid   autoimmune work up panel was negative   h/o of poor sleep anitha as per spouse   spoke to spouse janis   4/22 mental status will wax and wean as per spouse  also today spoke to son in detail in the past called today at [phone] went to voicemail     Greater than 37 minutes of time was spent with the patient, plan of care, reviewing data, with greater than  50% of the visit was spent counseling and/or coordinating care with multidisciplinary healthcare team

## 2023-04-23 NOTE — PROGRESS NOTE ADULT - NUTRITIONAL ASSESSMENT
This patient has been assessed with a concern for Malnutrition and has been determined to have a diagnosis/diagnoses of Severe protein-calorie malnutrition.    This patient is being managed with:   Diet NPO-  Entered: Apr 10 2023 11:36AM  
This patient has been assessed with a concern for Malnutrition and has been determined to have a diagnosis/diagnoses of Severe protein-calorie malnutrition.    This patient is being managed with:   Diet Pureed-  Mildly Thick Liquids (MILDTHICKLIQS)  Entered: Apr 17 2023  2:52PM  
This patient has been assessed with a concern for Malnutrition and has been determined to have a diagnosis/diagnoses of Severe protein-calorie malnutrition.    This patient is being managed with:   Diet Regular-  Entered: Apr 20 2023  2:34PM  
This patient has been assessed with a concern for Malnutrition and has been determined to have a diagnosis/diagnoses of Severe protein-calorie malnutrition.    This patient is being managed with:   Diet NPO-  Entered: Apr 10 2023 11:36AM  
This patient has been assessed with a concern for Malnutrition and has been determined to have a diagnosis/diagnoses of Severe protein-calorie malnutrition.    This patient is being managed with:   Diet Pureed-  Entered: Apr 14 2023  5:16PM  
This patient has been assessed with a concern for Malnutrition and has been determined to have a diagnosis/diagnoses of Severe protein-calorie malnutrition.    This patient is being managed with:   Diet Pureed-  Entered: Apr 14 2023  5:16PM  
This patient has been assessed with a concern for Malnutrition and has been determined to have a diagnosis/diagnoses of Severe protein-calorie malnutrition.    This patient is being managed with:   Diet NPO-  Entered: Apr 10 2023 11:36AM  
This patient has been assessed with a concern for Malnutrition and has been determined to have a diagnosis/diagnoses of Severe protein-calorie malnutrition.    This patient is being managed with:   Diet Easy to Chew-  Supplement Feeding Modality:  Oral  Ensure Plus High Protein Cans or Servings Per Day:  1       Frequency:  Daily  Entered: Apr 19 2023 11:55AM  
This patient has been assessed with a concern for Malnutrition and has been determined to have a diagnosis/diagnoses of Severe protein-calorie malnutrition.    This patient is being managed with:   Diet NPO-  Entered: Apr 10 2023 11:36AM  
This patient has been assessed with a concern for Malnutrition and has been determined to have a diagnosis/diagnoses of Severe protein-calorie malnutrition.    This patient is being managed with:   Diet Pureed-  Entered: Apr 14 2023  5:16PM  
This patient has been assessed with a concern for Malnutrition and has been determined to have a diagnosis/diagnoses of Severe protein-calorie malnutrition.    This patient is being managed with:   Diet Pureed-  Mildly Thick Liquids (MILDTHICKLIQS)  Entered: Apr 17 2023  2:52PM  
This patient has been assessed with a concern for Malnutrition and has been determined to have a diagnosis/diagnoses of Severe protein-calorie malnutrition.    This patient is being managed with:   Diet Regular-  Entered: Apr 20 2023  2:34PM  
This patient has been assessed with a concern for Malnutrition and has been determined to have a diagnosis/diagnoses of Severe protein-calorie malnutrition.    This patient is being managed with:   Diet NPO-  Entered: Apr 10 2023 11:36AM  
This patient has been assessed with a concern for Malnutrition and has been determined to have a diagnosis/diagnoses of Severe protein-calorie malnutrition.    This patient is being managed with:   Diet Regular-  Entered: Apr 20 2023  2:34PM  
This patient has been assessed with a concern for Malnutrition and has been determined to have a diagnosis/diagnoses of Severe protein-calorie malnutrition.    This patient is being managed with:   Diet NPO-  Entered: Apr 10 2023 11:36AM  
This patient has been assessed with a concern for Malnutrition and has been determined to have a diagnosis/diagnoses of Severe protein-calorie malnutrition.    This patient is being managed with:   Diet Regular-  Entered: Apr 20 2023  2:34PM

## 2023-04-23 NOTE — PROGRESS NOTE ADULT - PROBLEM SELECTOR PROBLEM 1
Altered mental status

## 2023-04-23 NOTE — PROGRESS NOTE ADULT - PROBLEM SELECTOR PLAN 2
Chronic, diagnosed 2005.  - Recent admission to [facility] for manic episode  - Hold lithium 2/2 litihium toxicity  - Psych (Dr. Floyd) consulted, appreciate recs  restart seroquel

## 2023-04-23 NOTE — PROGRESS NOTE ADULT - PROBLEM SELECTOR PLAN 8
- Heparin 5000units q8h in setting of renal impairment

## 2023-04-24 ENCOUNTER — TRANSCRIPTION ENCOUNTER (OUTPATIENT)
Age: 68
End: 2023-04-24

## 2023-04-24 VITALS
TEMPERATURE: 98 F | HEART RATE: 83 BPM | DIASTOLIC BLOOD PRESSURE: 81 MMHG | OXYGEN SATURATION: 98 % | SYSTOLIC BLOOD PRESSURE: 120 MMHG | RESPIRATION RATE: 16 BRPM

## 2023-04-24 LAB — ANA TITR SER: NEGATIVE — SIGNIFICANT CHANGE UP

## 2023-04-24 PROCEDURE — 96374 THER/PROPH/DIAG INJ IV PUSH: CPT

## 2023-04-24 PROCEDURE — 71045 X-RAY EXAM CHEST 1 VIEW: CPT

## 2023-04-24 PROCEDURE — 93005 ELECTROCARDIOGRAM TRACING: CPT

## 2023-04-24 PROCEDURE — 86431 RHEUMATOID FACTOR QUANT: CPT

## 2023-04-24 PROCEDURE — 84100 ASSAY OF PHOSPHORUS: CPT

## 2023-04-24 PROCEDURE — 84394 TOTAL TAU: CPT

## 2023-04-24 PROCEDURE — 36415 COLL VENOUS BLD VENIPUNCTURE: CPT

## 2023-04-24 PROCEDURE — 96372 THER/PROPH/DIAG INJ SC/IM: CPT

## 2023-04-24 PROCEDURE — 84484 ASSAY OF TROPONIN QUANT: CPT

## 2023-04-24 PROCEDURE — 87102 FUNGUS ISOLATION CULTURE: CPT

## 2023-04-24 PROCEDURE — 99231 SBSQ HOSP IP/OBS SF/LOW 25: CPT

## 2023-04-24 PROCEDURE — 82140 ASSAY OF AMMONIA: CPT

## 2023-04-24 PROCEDURE — 84157 ASSAY OF PROTEIN OTHER: CPT

## 2023-04-24 PROCEDURE — 86780 TREPONEMA PALLIDUM: CPT

## 2023-04-24 PROCEDURE — 92526 ORAL FUNCTION THERAPY: CPT

## 2023-04-24 PROCEDURE — 87205 SMEAR GRAM STAIN: CPT

## 2023-04-24 PROCEDURE — 85610 PROTHROMBIN TIME: CPT

## 2023-04-24 PROCEDURE — 95816 EEG AWAKE AND DROWSY: CPT

## 2023-04-24 PROCEDURE — 82962 GLUCOSE BLOOD TEST: CPT

## 2023-04-24 PROCEDURE — 82553 CREATINE MB FRACTION: CPT

## 2023-04-24 PROCEDURE — 80178 ASSAY OF LITHIUM: CPT

## 2023-04-24 PROCEDURE — 83520 IMMUNOASSAY QUANT NOS NONAB: CPT

## 2023-04-24 PROCEDURE — 99285 EMERGENCY DEPT VISIT HI MDM: CPT | Mod: 25

## 2023-04-24 PROCEDURE — 86255 FLUORESCENT ANTIBODY SCREEN: CPT

## 2023-04-24 PROCEDURE — 86592 SYPHILIS TEST NON-TREP QUAL: CPT

## 2023-04-24 PROCEDURE — 83735 ASSAY OF MAGNESIUM: CPT

## 2023-04-24 PROCEDURE — 83605 ASSAY OF LACTIC ACID: CPT

## 2023-04-24 PROCEDURE — 89051 BODY FLUID CELL COUNT: CPT

## 2023-04-24 PROCEDURE — 97162 PT EVAL MOD COMPLEX 30 MIN: CPT

## 2023-04-24 PROCEDURE — 97166 OT EVAL MOD COMPLEX 45 MIN: CPT

## 2023-04-24 PROCEDURE — 80048 BASIC METABOLIC PNL TOTAL CA: CPT

## 2023-04-24 PROCEDURE — 85025 COMPLETE CBC W/AUTO DIFF WBC: CPT

## 2023-04-24 PROCEDURE — 70450 CT HEAD/BRAIN W/O DYE: CPT | Mod: MA

## 2023-04-24 PROCEDURE — 92610 EVALUATE SWALLOWING FUNCTION: CPT

## 2023-04-24 PROCEDURE — 87529 HSV DNA AMP PROBE: CPT

## 2023-04-24 PROCEDURE — 97530 THERAPEUTIC ACTIVITIES: CPT

## 2023-04-24 PROCEDURE — 87070 CULTURE OTHR SPECIMN AEROBIC: CPT

## 2023-04-24 PROCEDURE — 87799 DETECT AGENT NOS DNA QUANT: CPT

## 2023-04-24 PROCEDURE — 87086 URINE CULTURE/COLONY COUNT: CPT

## 2023-04-24 PROCEDURE — 87040 BLOOD CULTURE FOR BACTERIA: CPT

## 2023-04-24 PROCEDURE — 97116 GAIT TRAINING THERAPY: CPT

## 2023-04-24 PROCEDURE — 86036 ANCA SCREEN EACH ANTIBODY: CPT

## 2023-04-24 PROCEDURE — 0035U NEURO CSF PRION PRTN QUAL: CPT

## 2023-04-24 PROCEDURE — 83519 RIA NONANTIBODY: CPT

## 2023-04-24 PROCEDURE — 86038 ANTINUCLEAR ANTIBODIES: CPT

## 2023-04-24 PROCEDURE — 86789 WEST NILE VIRUS ANTIBODY: CPT

## 2023-04-24 PROCEDURE — 86803 HEPATITIS C AB TEST: CPT

## 2023-04-24 PROCEDURE — 82009 KETONE BODYS QUAL: CPT

## 2023-04-24 PROCEDURE — 80053 COMPREHEN METABOLIC PANEL: CPT

## 2023-04-24 PROCEDURE — 86140 C-REACTIVE PROTEIN: CPT

## 2023-04-24 PROCEDURE — 36600 WITHDRAWAL OF ARTERIAL BLOOD: CPT

## 2023-04-24 PROCEDURE — 86147 CARDIOLIPIN ANTIBODY EA IG: CPT

## 2023-04-24 PROCEDURE — 81001 URINALYSIS AUTO W/SCOPE: CPT

## 2023-04-24 PROCEDURE — 85730 THROMBOPLASTIN TIME PARTIAL: CPT

## 2023-04-24 PROCEDURE — 87116 MYCOBACTERIA CULTURE: CPT

## 2023-04-24 PROCEDURE — 86788 WEST NILE VIRUS AB IGM: CPT

## 2023-04-24 PROCEDURE — 86403 PARTICLE AGGLUT ANTBDY SCRN: CPT

## 2023-04-24 PROCEDURE — 85652 RBC SED RATE AUTOMATED: CPT

## 2023-04-24 PROCEDURE — 86341 ISLET CELL ANTIBODY: CPT

## 2023-04-24 PROCEDURE — 87637 SARSCOV2&INF A&B&RSV AMP PRB: CPT

## 2023-04-24 PROCEDURE — 87483 CNS DNA AMP PROBE TYPE 12-25: CPT

## 2023-04-24 PROCEDURE — 82803 BLOOD GASES ANY COMBINATION: CPT

## 2023-04-24 PROCEDURE — 84145 PROCALCITONIN (PCT): CPT

## 2023-04-24 PROCEDURE — 94760 N-INVAS EAR/PLS OXIMETRY 1: CPT

## 2023-04-24 PROCEDURE — 86769 SARS-COV-2 COVID-19 ANTIBODY: CPT

## 2023-04-24 PROCEDURE — 85027 COMPLETE CBC AUTOMATED: CPT

## 2023-04-24 PROCEDURE — 82607 VITAMIN B-12: CPT

## 2023-04-24 PROCEDURE — 84443 ASSAY THYROID STIM HORMONE: CPT

## 2023-04-24 PROCEDURE — 86617 LYME DISEASE ANTIBODY: CPT

## 2023-04-24 PROCEDURE — 82550 ASSAY OF CK (CPK): CPT

## 2023-04-24 PROCEDURE — 82746 ASSAY OF FOLIC ACID SERUM: CPT

## 2023-04-24 PROCEDURE — 82945 GLUCOSE OTHER FLUID: CPT

## 2023-04-24 RX ORDER — LEVETIRACETAM 250 MG/1
1 TABLET, FILM COATED ORAL
Qty: 60 | Refills: 0
Start: 2023-04-24 | End: 2023-05-23

## 2023-04-24 RX ORDER — QUETIAPINE FUMARATE 200 MG/1
1 TABLET, FILM COATED ORAL
Refills: 0 | DISCHARGE

## 2023-04-24 RX ORDER — ACETAMINOPHEN 500 MG
2 TABLET ORAL
Qty: 0 | Refills: 0 | DISCHARGE
Start: 2023-04-24

## 2023-04-24 RX ORDER — QUETIAPINE FUMARATE 200 MG/1
1 TABLET, FILM COATED ORAL
Qty: 30 | Refills: 0
Start: 2023-04-24 | End: 2023-05-23

## 2023-04-24 RX ADMIN — ENOXAPARIN SODIUM 40 MILLIGRAM(S): 100 INJECTION SUBCUTANEOUS at 00:00

## 2023-04-24 RX ADMIN — LEVETIRACETAM 500 MILLIGRAM(S): 250 TABLET, FILM COATED ORAL at 06:41

## 2023-04-24 RX ADMIN — Medication 25 MICROGRAM(S): at 06:42

## 2023-04-24 NOTE — BH CONSULTATION LIAISON PROGRESS NOTE - NSBHPTASSESSDT_PSY_A_CORE
24-Apr-2023 09:23
19-Apr-2023 14:55
17-Apr-2023 14:21
20-Apr-2023 09:55
14-Apr-2023 10:53
22-Apr-2023 07:56

## 2023-04-24 NOTE — DISCHARGE NOTE NURSING/CASE MANAGEMENT/SOCIAL WORK - NSDCPEFALRISK_GEN_ALL_CORE
For information on Fall & Injury Prevention, visit: [email or web address] OR  [email or web address] OR  https://www.cdc.gov/steadi/patient.html

## 2023-04-24 NOTE — DISCHARGE NOTE NURSING/CASE MANAGEMENT/SOCIAL WORK - NSDPDISTO_GEN_ALL_CORE
Patient with history of rUTIs coming in for six month follow up symptom check. Patient chart reviewed, no need for call, all records available and ready for appointment.  Ask about symptoms: UA?  
Home

## 2023-04-24 NOTE — BH CONSULTATION LIAISON PROGRESS NOTE - NSBHFUPINTERVALCCFT_PSY_A_CORE
I am not sure what is going on.
Who did you use to remodel this room?
I am alright. I know I am in the hospital.
I am living here.
He was agitated during the night.
I am, I am.

## 2023-04-24 NOTE — BH CONSULTATION LIAISON PROGRESS NOTE - NSBHFUPINTERVALHXFT_PSY_A_CORE
Patient seen, evaluated and chart reviewed. Patient attempts to engage, but appears to be increasingly confused. As per wife he was lucid yesterday and was able to engage.
Patient seen, evaluated and chart reviewed. Patient is engaging today, is pleasantly confused, no evidence of psychosis, anitha or depressoin.
Patient seen, evaluated and chart reviewed. Patient able to interact for the first time, he is off Precedex. He knows he is in the hospital, but has limited understanding of hospitalization reasons. Lithium level 0.3 today.
Patient seen, evaluated and chart reviewed. Patient again attempts to engage, but appears to be increasingly confused. No change in mental status.
Patient seen, evaluated and chart reviewed. Patient again attempts to engage, but appears to be increasingly confused. 
Patient seen, evaluated and chart reviewed. Patient is lethargic today, as per nursing staff he has not slept during the night.

## 2023-04-24 NOTE — BH CONSULTATION LIAISON PROGRESS NOTE - NSBHATTESTBILLING_PSY_A_CORE
94868-Gicaxwczjb OBS or IP - low complexity OR 25-34 mins
[id number]-Mhcqidaxia OBS or IP - low complexity OR 25-34 mins
81334-Obpbhlzizv OBS or IP - low complexity OR 25-34 mins
[id number]-Vjledsagxg OBS or IP - low complexity OR 25-34 mins
[id number]-Llinzymbld OBS or IP - low complexity OR 25-34 mins
[id number]-Kktwfbuqhl OBS or IP - low complexity OR 25-34 mins

## 2023-04-24 NOTE — DISCHARGE NOTE NURSING/CASE MANAGEMENT/SOCIAL WORK - NSDCFUADDAPPT_GEN_ALL_CORE_FT
Wife informed to follow up with out patient psychiatry,  provided list of out patient resources as well as   [facility]  [address],  [facility]   [phone]

## 2023-04-24 NOTE — BH CONSULTATION LIAISON PROGRESS NOTE - CURRENT MEDICATION
MEDICATIONS  (STANDING):  enoxaparin Injectable 40 milliGRAM(s) SubCutaneous every 24 hours  levETIRAcetam  IVPB 500 milliGRAM(s) IV Intermittent every 12 hours  levothyroxine 25 MICROGram(s) Oral daily    MEDICATIONS  (PRN):  haloperidol    Injectable 2.5 milliGRAM(s) IntraMuscular every 6 hours PRN Agitation  labetalol Injectable 10 milliGRAM(s) IV Push every 6 hours PRN Systolic blood pressure >160  
MEDICATIONS  (STANDING):  enoxaparin Injectable 40 milliGRAM(s) SubCutaneous every 24 hours  levETIRAcetam 500 milliGRAM(s) Oral two times a day  levothyroxine 25 MICROGram(s) Oral daily    MEDICATIONS  (PRN):  haloperidol    Injectable 2.5 milliGRAM(s) IntraMuscular every 6 hours PRN Agitation  labetalol Injectable 10 milliGRAM(s) IV Push every 6 hours PRN Systolic blood pressure >160  
MEDICATIONS  (STANDING):  enoxaparin Injectable 40 milliGRAM(s) SubCutaneous every 24 hours  levETIRAcetam 500 milliGRAM(s) Oral two times a day  levothyroxine 25 MICROGram(s) Oral daily  melatonin 5 milliGRAM(s) Oral at bedtime  QUEtiapine 200 milliGRAM(s) Oral at bedtime  tamsulosin 0.8 milliGRAM(s) Oral at bedtime    MEDICATIONS  (PRN):  acetaminophen     Tablet .. 650 milliGRAM(s) Oral every 6 hours PRN Mild Pain (1 - 3)  
MEDICATIONS  (STANDING):  enoxaparin Injectable 40 milliGRAM(s) SubCutaneous every 24 hours  levETIRAcetam 500 milliGRAM(s) Oral two times a day  levothyroxine 25 MICROGram(s) Oral daily  QUEtiapine 200 milliGRAM(s) Oral at bedtime  tamsulosin 0.8 milliGRAM(s) Oral at bedtime    MEDICATIONS  (PRN):  
MEDICATIONS  (STANDING):  dexMEDEtomidine Infusion 0.1 MICROgram(s)/kG/Hr (2.04 mL/Hr) IV Continuous <Continuous>  dextrose 5%. 1000 milliLiter(s) (80 mL/Hr) IV Continuous <Continuous>  enoxaparin Injectable 40 milliGRAM(s) SubCutaneous every 24 hours  labetalol Injectable 10 milliGRAM(s) IV Push once  levETIRAcetam  IVPB 500 milliGRAM(s) IV Intermittent every 12 hours  levothyroxine Injectable 18.75 MICROGram(s) IV Push at bedtime    MEDICATIONS  (PRN):  haloperidol    Injectable 2.5 milliGRAM(s) IntraMuscular every 6 hours PRN Agitation  
MEDICATIONS  (STANDING):  enoxaparin Injectable 40 milliGRAM(s) SubCutaneous every 24 hours  levETIRAcetam 500 milliGRAM(s) Oral two times a day  levothyroxine 25 MICROGram(s) Oral daily    MEDICATIONS  (PRN):  haloperidol    Injectable 2.5 milliGRAM(s) IntraMuscular every 6 hours PRN Agitation  labetalol Injectable 10 milliGRAM(s) IV Push every 6 hours PRN Systolic blood pressure >160

## 2023-04-24 NOTE — CHART NOTE - NSCHARTNOTEFT_GEN_A_CORE

## 2023-04-24 NOTE — BH CONSULTATION LIAISON PROGRESS NOTE - NSICDXBHPRIMARYDX_PSY_ALL_CORE
Bipolar 1 disorder   F31.9  

## 2023-04-24 NOTE — BH CONSULTATION LIAISON PROGRESS NOTE - NSBHFUPREASONCONS_PSY_A_CORE
agitation/psychosis

## 2023-04-24 NOTE — BH CONSULTATION LIAISON PROGRESS NOTE - NSBHMSESPABN_PSY_A_CORE
Soft volume/Slowed rate/Decreased productivity/Impaired articulation

## 2023-04-24 NOTE — BH CONSULTATION LIAISON PROGRESS NOTE - NSBHCONSULTFOLLOWAFTERCARE_PSY_A_CORE FT
F-up outpatient.

## 2023-04-24 NOTE — DISCHARGE NOTE NURSING/CASE MANAGEMENT/SOCIAL WORK - PATIENT PORTAL LINK FT
You can access the FollowMyHealth Patient Portal offered by Health system by registering at the following website: [email or web address]. By joining [facility]’s FollowMyHealth portal, you will also be able to view your health information using other applications (apps) compatible with our system.

## 2023-04-24 NOTE — CHART NOTE - NSCHARTNOTEFT_GEN_A_CORE
Nutrition follow up ( chart reviewed, events noted) Brief hx:      Factors impacting intake: [ ] none [ ] nausea  [ ] vomiting [ ] diarrhea [ ] constipation  [ ]chewing problems [ ] swallowing issues  [ ] other:     Diet Prescription: Diet, Regular (04-20-23 @ 14:34)    Intake:     Current Weight:   % Weight Change    Pertinent Medications: MEDICATIONS  (STANDING):  enoxaparin Injectable 40 milliGRAM(s) SubCutaneous every 24 hours  levETIRAcetam 500 milliGRAM(s) Oral two times a day  levothyroxine 25 MICROGram(s) Oral daily  melatonin 5 milliGRAM(s) Oral at bedtime  QUEtiapine 200 milliGRAM(s) Oral at bedtime  tamsulosin 0.8 milliGRAM(s) Oral at bedtime    MEDICATIONS  (PRN):  acetaminophen     Tablet .. 650 milliGRAM(s) Oral every 6 hours PRN Mild Pain (1 - 3)    Pertinent Labs:  04-20 Alb 3.4 g/dL     CAPILLARY BLOOD GLUCOSE        Skin:     Estimated Needs:   [ ] no change since previous assessment  [ ] recalculated:     Previous Nutrition Diagnosis:   [ ] Inadequate Energy Intake [ ]Inadequate Oral Intake [ ] Excessive Energy Intake   [ ] Underweight [ ] Increased Nutrient Needs [ ] Overweight/Obesity   [ ] Altered GI Function [ ] Unintended Weight Loss [ ] Food & Nutrition Related Knowledge Deficit [ ] Malnutrition     Nutrition Diagnosis is [ ] ongoing  [ ] resolved [ ] not applicable     New Nutrition Diagnosis: [ ] not applicable       Interventions:   Recommend  [ ] Change Diet To:  [ ] Nutrition Supplement  [ ] Nutrition Support  [ ] Other:     Monitoring and Evaluation:   [ ] PO intake [ x ] Tolerance to diet prescription [ x ] weights [ x ] labs[ x ] follow up per protocol  [ ] other: Nutrition follow up ( chart reviewed, events noted) Brief hx:   Problem/Plan - 1:  ·  Problem: Altered mental status.   ·  Plan: AMS in the setting of progressive dementia vs. medications vs. electrolyte imbalance  - Abnormal EEG of unclear significance -- started on empiric Keppra   - now on seroquel  - S/P LP no evidence of encephalitis  - ID consult with dr. woody appreciated  - F/u UCx, BCx x2 -- NTD  - Neuro follow up  - Psych (Dr. Floyd) consulted, f/u recs.     Problem/Plan - 2:  ·  Problem: Bipolar disorder.   ·  Plan: Chronic, diagnosed 2005.  - Recent admission to [facility] for manic episode  - Hold lithium 2/2 litihium toxicity  - Psych (Dr. Floyd) consulted, appreciate recs  restart seroquel.     Problem/Plan - 3:  ·  Problem: Lithium toxicity.   ·  Plan: Hold lithium   Monitor levels -- improving  Replete lytes  Renal follow up  resolved.     Problem/Plan - 4:  ·  Problem: Fever.   ·  Plan: Resolved / improved  Off Zosyn  ID eval with  appreciated.     Problem/Plan - 5:  ·  Problem: Urinary retention.   ·  Plan: Chronic   - Duenas cath replaced in ED  - Continue home tamsulosin 0.4mg BID.     Problem/Plan - 6:  ·  Problem: Stage 3 chronic kidney disease.   ·  Plan: Patient recently diagnosed w/ CKD3 at [facility]  - Cr. 1.9 on admission, baseline unknown  - Continue to monitor with daily BMP  - Avoid nephrotoxic agents.  - Renal follow up.     Problem/Plan - 7:  ·  Problem: Hypothyroidism.   ·  Plan: Chronic  - Continue home levothyroxine 25mcg daily.     Problem/Plan - 8:  ·  Problem: Need for prophylactic measure.   ·  Plan: - Heparin 5000units q8h in setting of renal impairment.     Problem/Plan - 9:  ·  Problem: Fall.   ·  Plan: fall precautions.        Factors impacting intake: [ ] none [ ] nausea  [ ] vomiting [ ] diarrhea [ ] constipation  [ ]chewing problems [ ] swallowing issues  [ ] other:     Diet Prescription: Diet, Regular (04-20-23 @ 14:34)    Intake:     Current Weight:   % Weight Change    Pertinent Medications: MEDICATIONS  (STANDING):  enoxaparin Injectable 40 milliGRAM(s) SubCutaneous every 24 hours  levETIRAcetam 500 milliGRAM(s) Oral two times a day  levothyroxine 25 MICROGram(s) Oral daily  melatonin 5 milliGRAM(s) Oral at bedtime  QUEtiapine 200 milliGRAM(s) Oral at bedtime  tamsulosin 0.8 milliGRAM(s) Oral at bedtime    MEDICATIONS  (PRN):  acetaminophen     Tablet .. 650 milliGRAM(s) Oral every 6 hours PRN Mild Pain (1 - 3)    Pertinent Labs:  04-20 Alb 3.4 g/dL     CAPILLARY BLOOD GLUCOSE        Skin:     Estimated Needs:   [ ] no change since previous assessment  [ ] recalculated:     Previous Nutrition Diagnosis:   [ ] Inadequate Energy Intake [ ]Inadequate Oral Intake [ ] Excessive Energy Intake   [ ] Underweight [ ] Increased Nutrient Needs [ ] Overweight/Obesity   [ ] Altered GI Function [ ] Unintended Weight Loss [ ] Food & Nutrition Related Knowledge Deficit [ ] Malnutrition     Nutrition Diagnosis is [ ] ongoing  [ ] resolved [ ] not applicable     New Nutrition Diagnosis: [ ] not applicable       Interventions:   Recommend  [ ] Change Diet To:  [ ] Nutrition Supplement  [ ] Nutrition Support  [ ] Other:     Monitoring and Evaluation:   [ ] PO intake [ x ] Tolerance to diet prescription [ x ] weights [ x ] labs[ x ] follow up per protocol  [ ] other: Nutrition follow up ( chart reviewed, events noted) Brief hx:   66 YO M with pmhx of bipolar disorder, stage III CKD , hypothyroidism , urinary retention admitted with AMS, lithium toxicity , fever . AMS in the setting of progressive dementia vs. medications vs. electrolyte imbalance  now on seroquel  S/P LP no evidence of encephalitis . Fever resolved / improved    Pt visited at bedside by RD this am . Wife in attendance. Wife and RN report pt eating very well with full feeding assistance; had a BM very early this am . Pt is pleasantly confused    Factors impacting intake: [ ] none [ ] nausea  [ ] vomiting [ ] diarrhea [ ] constipation  [ ]chewing problems [ ] swallowing issues  [ X] other: feeding self    Diet Prescription: Diet, Regular (04-20-23 @ 14:34)    Intake: >75%    Current Weight: 83.2 kg ( 4/19/23) 81.5 kg ( 4/8/23)     Pertinent Medications: MEDICATIONS  (STANDING):  enoxaparin Injectable 40 milliGRAM(s) SubCutaneous every 24 hours  levETIRAcetam 500 milliGRAM(s) Oral two times a day  levothyroxine 25 MICROGram(s) Oral daily  melatonin 5 milliGRAM(s) Oral at bedtime  QUEtiapine 200 milliGRAM(s) Oral at bedtime  tamsulosin 0.8 milliGRAM(s) Oral at bedtime    MEDICATIONS  (PRN):  acetaminophen     Tablet .. 650 milliGRAM(s) Oral every 6 hours PRN Mild Pain (1 - 3)    Pertinent Labs:  04-20 Alb 3.4 g/dL     CAPILLARY BLOOD GLUCOSE    Skin:     Estimated Needs:   [ ] no change since previous assessment  [ ] recalculated:     Previous Nutrition Diagnosis:   [ ] Inadequate Energy Intake [ ]Inadequate Oral Intake [ ] Excessive Energy Intake   [ ] Underweight [ ] Increased Nutrient Needs [ ] Overweight/Obesity   [ ] Altered GI Function [ ] Unintended Weight Loss [ ] Food & Nutrition Related Knowledge Deficit [ ] Malnutrition     Nutrition Diagnosis is [ ] ongoing  [ ] resolved [ ] not applicable     New Nutrition Diagnosis: [ ] not applicable       Interventions:   Recommend  [ ] Change Diet To:  [ ] Nutrition Supplement  [ ] Nutrition Support  [ ] Other:     Monitoring and Evaluation:   [ ] PO intake [ x ] Tolerance to diet prescription [ x ] weights [ x ] labs[ x ] follow up per protocol  [ ] other: Nutrition follow up ( chart reviewed, events noted) Brief hx:   66 YO M with pmhx of bipolar disorder, stage III CKD , hypothyroidism , urinary retention admitted with AMS, lithium toxicity , fever . AMS in the setting of progressive dementia vs. medications vs. electrolyte imbalance  now on seroquel  S/P LP no evidence of encephalitis . Fever resolved / improved    Pt visited at bedside by RD this am . Wife in attendance. Wife and RN report pt eating very well with full feeding assistance; had a BM very early this am . Pt is pleasantly confused    Factors impacting intake: [ ] none [ ] nausea  [ ] vomiting [ ] diarrhea [ ] constipation  [ ]chewing problems [ ] swallowing issues  [ X] other: feeding self    Diet Prescription: Diet, Regular (04-20-23 @ 14:34)  SLP upgraded pt to regular texture 4/20/23    Intake: >75%    Current Weight: 83.2 kg ( 4/19/23) 81.5 kg ( 4/8/23)     Pertinent Medications: MEDICATIONS  (STANDING):  enoxaparin Injectable 40 milliGRAM(s) SubCutaneous every 24 hours  levETIRAcetam 500 milliGRAM(s) Oral two times a day  levothyroxine 25 MICROGram(s) Oral daily  melatonin 5 milliGRAM(s) Oral at bedtime  QUEtiapine 200 milliGRAM(s) Oral at bedtime  tamsulosin 0.8 milliGRAM(s) Oral at bedtime    MEDICATIONS  (PRN):  acetaminophen     Tablet .. 650 milliGRAM(s) Oral every 6 hours PRN Mild Pain (1 - 3)    Pertinent Labs:  04-20 Alb 3.4 g/dL     CAPILLARY BLOOD GLUCOSE    Skin: intact  edema: none    Estimated Needs:   [ X] no change since previous assessment, based on IBW of 75.5 kg ( 25-30 kcals/kg) 5363-2667 kcals and ( 0.9-1.1 gm pro/kg)~ 70-85 gm fpro  [ ] recalculated:     Previous Nutrition Diagnosis:   [ ] Inadequate Energy Intake [ ]Inadequate Oral Intake [ ] Excessive Energy Intake   [ ] Underweight [ ] Increased Nutrient Needs [ ] Overweight/Obesity   [ ] Altered GI Function [ ] Unintended Weight Loss [ ] Food & Nutrition Related Knowledge Deficit [X ] Malnutrition , severe in context of acute illness    Nutrition Diagnosis is [X ] ongoing  [ ] resolved [ ] not applicable     New Nutrition Diagnosis: [X ] not applicable       Interventions:   Recommend  [ ] Change Diet To:  [ ] Nutrition Supplement  [ ] Nutrition Support  [X ] Other: continue current POC    Monitoring and Evaluation:   [X ] PO intake [ x ] Tolerance to diet prescription [ x ] weights [ x ] labs[ x ] follow up per protocol  [X ] other: skin integrity

## 2023-04-24 NOTE — PROGRESS NOTE ADULT - REASON FOR ADMISSION
AMS

## 2023-04-24 NOTE — PROGRESS NOTE ADULT - SUBJECTIVE AND OBJECTIVE BOX
Neurology follow up note    MAXI [id number]      Interval History:    Patient feels ok no new complaints.    Allergies    No Known Allergies    Intolerances        MEDICATIONS    acetaminophen     Tablet .. 650 milliGRAM(s) Oral every 6 hours PRN  enoxaparin Injectable 40 milliGRAM(s) SubCutaneous every 24 hours  levETIRAcetam 500 milliGRAM(s) Oral two times a day  levothyroxine 25 MICROGram(s) Oral daily  melatonin 5 milliGRAM(s) Oral at bedtime  QUEtiapine 200 milliGRAM(s) Oral at bedtime  tamsulosin 0.8 milliGRAM(s) Oral at bedtime              Vital Signs Last 24 Hrs  T(C): 37.1 (24 Apr 2023 06:46), Max: 37.3 (23 Apr 2023 13:17)  T(F): 98.7 (24 Apr 2023 06:46), Max: 99.1 (23 Apr 2023 13:17)  HR: 76 (24 Apr 2023 06:46) (76 - 105)  BP: 124/69 (24 Apr 2023 06:46) (106/70 - 124/69)  BP(mean): --  RR: 18 (24 Apr 2023 06:46) (18 - 18)  SpO2: 96% (24 Apr 2023 06:46) (96% - 98%)    Parameters below as of 24 Apr 2023 06:46  Patient On (Oxygen Delivery Method): room air      REVIEW OF SYSTEMS: Limited or unable to obtain secondary to patient's poor mental status would answer ok or alright to any question ask to him feels ok       On Neurological Examination:    Mental Status - Patient is awake alert  did name objects   on and off following commands today did follow a complex command     Speech - says few words      Cranial Nerves -   extraocular eye movements intact.   intact bilateral NLF    Motor Exam -  With stimuli positive movement of all 4 extremities    Muscle tone - is normal all over.  No asymmetry is seen.      Sensory    Bilateral intact to light touch    GENERAL Exam: Nontoxic , No Acute Distress   	  HEENT:  normocephalic, atraumatic  		  LUNGS:  Decreased bilaterally  	  HEART: Normal S1S2   No murmur RRR        	  GI/ ABDOMEN:  Soft  Non tender    EXTREMITIES:   No Edema  No Clubbing  No Cyanosis     SKIN: Normal  No Ecchymosis                 LABS:            Hemoglobin A1C:       Vitamin B12         RADIOLOGY        ASSESSMENT AND PLAN   AMS possible underlying psych issues confusion   psych follow up adjust medications as needed  possible underlying seizures on keppra  hypothyroidism synthroid   autoimmune work up panel was negative   h/o of poor sleep anitha as per spouse   spoke to spouse as per her doing well  spoke to spouse janis   4/24 mental status will wax and wean as per spouse spoke to son in detail in the past    Neurologic wise stable dc planning     Greater than 31 minutes of time was spent with the patient, plan of care, reviewing data, with greater than  50% of the visit was spent counseling and/or coordinating care with multidisciplinary healthcare team

## 2023-04-24 NOTE — SOCIAL WORK PROGRESS NOTE - NSSWPROGRESSNOTE_GEN_ALL_CORE
Discussed today at rounds. MD informed me patient is ready for d/c and that spouse now agrees to sub-acute rehab. Spoke with wife by several times today. She is now refusing rehab placement. Suggested HC PT but she is refusing that and want to take him to out patient PT at [facility]. Confirmed with Discussed today at rounds. MD informed me patient is ready for d/c and that spouse now agrees to sub-acute rehab. Spoke with wife by phone  several times today. She is now refusing rehab placement. Suggested HC PT but she is refusing that as well and wants to take him to out patient PT at [facility]. Confirmed with PT that it will be difficult for spouse to get him to out patient PT. Wife was informed and educated of benefits of Home Care  but still declining HC PT states son will help her. She is aware he need follow up with psych upon discharge. She will transport patient home by car today. MD and nurse on unit aware and both updated re. d/c plan.

## 2023-04-24 NOTE — BH CONSULTATION LIAISON PROGRESS NOTE - NSBHPSYCHOLCOGABN_PSY_A_CORE
disoriented to time/disoriented to situation

## 2023-04-24 NOTE — BH CONSULTATION LIAISON PROGRESS NOTE - LEVEL OF CONSCIOUSNESS
Lethargic, arousable to verbal stimulus

## 2023-04-24 NOTE — CHART NOTE - NSCHARTNOTESELECT_GEN_ALL_CORE
Event Note
Nutrition Services
ACP/Event Note
Nutrition Services
outpt MRI/Event Note

## 2023-04-24 NOTE — BH CONSULTATION LIAISON PROGRESS NOTE - NSBHCHARTREVIEWVS_PSY_A_CORE FT
Vital Signs Last 24 Hrs  T(C): 36.6 (19 Apr 2023 06:00), Max: 36.6 (19 Apr 2023 06:00)  T(F): 97.8 (19 Apr 2023 06:00), Max: 97.8 (19 Apr 2023 06:00)  HR: 77 (19 Apr 2023 06:00) (77 - 77)  BP: 142/85 (19 Apr 2023 06:00) (142/85 - 142/85)  BP(mean): --  RR: 18 (19 Apr 2023 06:00) (18 - 18)  SpO2: 98% (19 Apr 2023 06:00) (98% - 98%)    Parameters below as of 19 Apr 2023 06:00  Patient On (Oxygen Delivery Method): room air    
Vital Signs Last 24 Hrs  T(C): 36.5 (20 Apr 2023 05:47), Max: 36.5 (19 Apr 2023 21:45)  T(F): 97.7 (20 Apr 2023 05:47), Max: 97.7 (19 Apr 2023 21:45)  HR: 68 (20 Apr 2023 05:47) (68 - 70)  BP: 122/81 (20 Apr 2023 05:47) (122/81 - 134/78)  BP(mean): --  RR: 17 (20 Apr 2023 05:47) (17 - 19)  SpO2: 96% (20 Apr 2023 05:47) (96% - 97%)    Parameters below as of 20 Apr 2023 05:47  Patient On (Oxygen Delivery Method): room air    
Vital Signs Last 24 Hrs  T(C): 36.5 (17 Apr 2023 07:51), Max: 37.1 (17 Apr 2023 04:23)  T(F): 97.7 (17 Apr 2023 07:51), Max: 98.8 (17 Apr 2023 04:23)  HR: 91 (17 Apr 2023 13:44) (78 - 108)  BP: 165/74 (17 Apr 2023 13:44) (142/72 - 170/86)  BP(mean): --  RR: 12 (17 Apr 2023 07:51) (12 - 19)  SpO2: 96% (17 Apr 2023 07:51) (94% - 96%)    Parameters below as of 17 Apr 2023 07:51  Patient On (Oxygen Delivery Method): room air    
Vital Signs Last 24 Hrs  T(C): 37.1 (24 Apr 2023 06:46), Max: 37.3 (23 Apr 2023 13:17)  T(F): 98.7 (24 Apr 2023 06:46), Max: 99.1 (23 Apr 2023 13:17)  HR: 76 (24 Apr 2023 06:46) (76 - 105)  BP: 124/69 (24 Apr 2023 06:46) (106/70 - 124/69)  BP(mean): --  RR: 18 (24 Apr 2023 06:46) (18 - 18)  SpO2: 96% (24 Apr 2023 06:46) (96% - 98%)    Parameters below as of 24 Apr 2023 06:46  Patient On (Oxygen Delivery Method): room air    
Vital Signs Last 24 Hrs  T(C): 37.4 (14 Apr 2023 04:29), Max: 37.4 (14 Apr 2023 04:29)  T(F): 99.4 (14 Apr 2023 04:29), Max: 99.4 (14 Apr 2023 04:29)  HR: 88 (14 Apr 2023 09:00) (61 - 107)  BP: 163/76 (14 Apr 2023 09:00) (119/58 - 193/91)  BP(mean): 109 (14 Apr 2023 09:00) (78 - 134)  RR: 18 (14 Apr 2023 09:00) (12 - 33)  SpO2: 96% (14 Apr 2023 09:00) (95% - 100%)    Parameters below as of 14 Apr 2023 07:00  Patient On (Oxygen Delivery Method): room air    
Vital Signs Last 24 Hrs  T(C): 36.5 (22 Apr 2023 05:30), Max: 36.5 (22 Apr 2023 05:30)  T(F): 97.7 (22 Apr 2023 05:30), Max: 97.7 (22 Apr 2023 05:30)  HR: 73 (22 Apr 2023 05:30) (73 - 77)  BP: 108/69 (22 Apr 2023 05:30) (108/69 - 142/76)  BP(mean): --  RR: 17 (22 Apr 2023 05:30) (17 - 18)  SpO2: 96% (22 Apr 2023 05:30) (96% - 99%)    Parameters below as of 22 Apr 2023 05:30  Patient On (Oxygen Delivery Method): room air    
Advancement Flap (Double) Text: The defect edges were debeveled with a #15 scalpel blade.  Given the location of the defect and the proximity to free margins a double advancement flap was deemed most appropriate.  Using a sterile surgical marker, the appropriate advancement flaps were drawn incorporating the defect and placing the expected incisions within the relaxed skin tension lines where possible.    The area thus outlined was incised deep to adipose tissue with a #15 scalpel blade.  The skin margins were undermined to an appropriate distance in all directions utilizing iris scissors.

## 2023-04-25 NOTE — CASE MANAGEMENT PROGRESS NOTE - NSCMPROGRESSNOTE_GEN_ALL_CORE
Patient is for transition to home with wife. SW spoke with patient and wife in ref to [facility]. Wife insists on taking patient home and declining all HCS at this time. Pt is recommended for RW and marla delivered to e by [facility]. CMwillremain available.  Patient is for transition to home with wife. SW spoke with patient and wife in ref to [facility]. Wife insists on taking patient home and declining all HCS at this time. Pt is recommended for RW by Physical Therapist. The patient has a ability limitation that significantly impairs the ability to participate in mobility related activities of daily living in his home. The patient is safely abletouse the RW with PT and the functional mobility deficit can be sufficiently resolved by use of a RW.  RW referral has been sent and to be delivered by [facility]. CM will remain available.  Patient is for transition to home with wife. SW spoke with patient and wife in ref to [facility]. Wife insists on taking patient home and declining all HCS at this time. Pt is recommended for RW by Physical Therapist.  RW referral has been sent to [facility]. As per Comm [facility], pt's insurance will only cover 70% after deductible has been met and pt's deductible is not yet met. Therfore, pt will be responsible for full cost. Called back patient's wife and she will have her son buy a RW at stores or online. CM will remain available.

## 2023-04-27 LAB
C NEOFORM RRNA SPEC NAA+PROBE-ACNC: SIGNIFICANT CHANGE UP
CMV DNA CSF QL NAA+PROBE: SIGNIFICANT CHANGE UP
E COLI K1 DNA CSF QL NAA+NON-PROBE: SIGNIFICANT CHANGE UP
ESCHERICHIA COLI K1: SIGNIFICANT CHANGE UP
EV RNA CSF QL NAA+PROBE: SIGNIFICANT CHANGE UP
GP B STREP DNA SPEC QL NAA+PROBE: SIGNIFICANT CHANGE UP
HAEM INFLU DNA SPEC QL NAA+PROBE: SIGNIFICANT CHANGE UP
HHV6 DNA CSF QL NAA+PROBE: SIGNIFICANT CHANGE UP
HSV1 DNA CSF QL NAA+PROBE: SIGNIFICANT CHANGE UP
HSV2 DNA CSF QL NAA+PROBE: SIGNIFICANT CHANGE UP
L MONOCYTOG DNA SPEC QL NAA+PROBE: SIGNIFICANT CHANGE UP
N MEN DNA SPEC QL NAA+PROBE: SIGNIFICANT CHANGE UP
PARECHOVIRUS A RNA SPEC QL NAA+PROBE: SIGNIFICANT CHANGE UP
S PNEUM DNA SPEC QL NAA+PROBE: SIGNIFICANT CHANGE UP
VZV DNA CSF QL NAA+PROBE: SIGNIFICANT CHANGE UP

## 2023-05-03 ENCOUNTER — NON-APPOINTMENT (OUTPATIENT)
Age: 68
End: 2023-05-03

## 2023-05-03 ENCOUNTER — APPOINTMENT (OUTPATIENT)
Dept: INTERNAL MEDICINE | Facility: CLINIC | Age: 68
End: 2023-05-03
Payer: COMMERCIAL

## 2023-05-03 VITALS
SYSTOLIC BLOOD PRESSURE: 118 MMHG | HEART RATE: 95 BPM | WEIGHT: 193 LBS | TEMPERATURE: 99.1 F | OXYGEN SATURATION: 97 % | HEIGHT: 70 IN | DIASTOLIC BLOOD PRESSURE: 76 MMHG | BODY MASS INDEX: 27.63 KG/M2

## 2023-05-03 DIAGNOSIS — N18.30 CHRONIC KIDNEY DISEASE, STAGE 3 UNSPECIFIED: ICD-10-CM

## 2023-05-03 DIAGNOSIS — Z78.9 OTHER SPECIFIED HEALTH STATUS: ICD-10-CM

## 2023-05-03 DIAGNOSIS — H91.90 UNSPECIFIED HEARING LOSS, UNSPECIFIED EAR: ICD-10-CM

## 2023-05-03 DIAGNOSIS — G47.00 INSOMNIA, UNSPECIFIED: ICD-10-CM

## 2023-05-03 DIAGNOSIS — G40.909 EPILEPSY, UNSPECIFIED, NOT INTRACTABLE, W/OUT STATUS EPILEPTICUS: ICD-10-CM

## 2023-05-03 DIAGNOSIS — Z81.8 FAMILY HISTORY OF OTHER MENTAL AND BEHAVIORAL DISORDERS: ICD-10-CM

## 2023-05-03 DIAGNOSIS — H71.93 UNSPECIFIED CHOLESTEATOMA, BILATERAL: ICD-10-CM

## 2023-05-03 DIAGNOSIS — G92.8 OTHER TOXIC ENCEPHALOPATHY: ICD-10-CM

## 2023-05-03 PROBLEM — E03.9 HYPOTHYROIDISM, UNSPECIFIED: Chronic | Status: ACTIVE | Noted: 2023-04-08

## 2023-05-03 PROBLEM — F31.9 BIPOLAR DISORDER, UNSPECIFIED: Chronic | Status: ACTIVE | Noted: 2023-04-08

## 2023-05-03 PROBLEM — R33.9 RETENTION OF URINE, UNSPECIFIED: Chronic | Status: ACTIVE | Noted: 2023-04-08

## 2023-05-03 PROCEDURE — 99204 OFFICE O/P NEW MOD 45 MIN: CPT

## 2023-05-03 RX ORDER — FLUTICASONE FUROATE AND VILANTEROL TRIFENATATE 50; 25 UG/1; UG/1
POWDER RESPIRATORY (INHALATION)
Refills: 0 | Status: ACTIVE | COMMUNITY

## 2023-05-04 PROBLEM — G92.8 TOXIC METABOLIC ENCEPHALOPATHY: Status: ACTIVE | Noted: 2023-05-04

## 2023-05-04 PROBLEM — G40.909 SEIZURE DISORDER: Status: ACTIVE | Noted: 2023-05-03

## 2023-05-04 NOTE — HISTORY OF PRESENT ILLNESS
[FreeTextEntry8] : 67 year old M comes to establish medical care. Previously followed by Dr. Patel Mcfarlane at [facility] in [place], was not happy with his care.\par \par He was hospitalized at [facility] 4/8/23 - 4/24/23 for AMS likely due to toxic metabolic encephalopathy. Was evaluated in hospital by neurology and psychiatry. EEG showed possible seizure so was started on Keppra. He had lithium toxicity (hx of bipolar type 1) but resolved after discontinuing medication. He was started on Seroquel 200 mg at bedtime. Infectious workup including LP was negative. Patient was discharged home and advised to follow up with neurology and psychiatry.\par \par Patient reports feeling well and back to his normal baseline. He stopped taking Keppra after his wife read online about potential side effects. His weakness and fatigue have vastly improved. He does report pedal edema since discharge. He admits to being more sedentary at home. No cp or SOB. Beside the Keppra, he is compliant with the medications. He was seeing an NP psychiatrist but he wishes to switch care to a physician. \par  [Spouse] : spouse

## 2023-05-04 NOTE — PHYSICAL EXAM
[Normal] : normal rate, regular rhythm, normal S1 and S2 and no murmur heard [Soft] : abdomen soft [Non Tender] : non-tender [Non-distended] : non-distended [Normal Bowel Sounds] : normal bowel sounds [No Rash] : no rash [Normal Gait] : normal gait [Normal Affect] : the affect was normal [Normal Mood] : the mood was normal [de-identified] : friendly, quiet demeanor [de-identified] : b/l 1+ pedal edema

## 2023-05-04 NOTE — PLAN
[FreeTextEntry1] : Suspected toxic metabolic encephalopathy in hospital. Patient with bipolar type 1, mood now normal on Seroquel 200 mg daily. I tried getting him a psychiatry consult at [facility] but was unsuccessful. [facility] behavioral health referral placed in to assist in finding a psychiatrist for med management.\par \par Neurology consult scheduled with Dr. Guerra in 7/23 for questionable seizure on EEG in hospital. Patient self-discontinued Keppra at this time. \par \par Hypothyroidism on Levothyroxine 25 mcg daily. Check labs at next visit.\par \par RTO 3 months.

## 2023-05-10 ENCOUNTER — APPOINTMENT (OUTPATIENT)
Dept: NEUROLOGY | Facility: CLINIC | Age: 68
End: 2023-05-10

## 2023-05-13 LAB
CULTURE RESULTS: SIGNIFICANT CHANGE UP
SPECIMEN SOURCE: SIGNIFICANT CHANGE UP

## 2023-05-17 ENCOUNTER — TRANSCRIPTION ENCOUNTER (OUTPATIENT)
Age: 68
End: 2023-05-17

## 2023-05-31 LAB
CULTURE RESULTS: SIGNIFICANT CHANGE UP
SPECIMEN SOURCE: SIGNIFICANT CHANGE UP

## 2023-06-01 ENCOUNTER — APPOINTMENT (OUTPATIENT)
Dept: INTERNAL MEDICINE | Facility: CLINIC | Age: 68
End: 2023-06-01
Payer: COMMERCIAL

## 2023-06-01 VITALS
DIASTOLIC BLOOD PRESSURE: 80 MMHG | SYSTOLIC BLOOD PRESSURE: 108 MMHG | HEART RATE: 100 BPM | OXYGEN SATURATION: 96 % | HEIGHT: 70 IN | WEIGHT: 190 LBS | BODY MASS INDEX: 27.2 KG/M2

## 2023-06-01 PROCEDURE — 99213 OFFICE O/P EST LOW 20 MIN: CPT

## 2023-06-01 NOTE — PLAN
[FreeTextEntry1] : Pt reassured that renal function improved throughout hospitalization last month. Will recheck labs in 8/23.\par Bipolar type 1 stable on Seroquel 200 mg daily. Followed with psychiatrist at [facility] psychiatry.\par Check TFT's at next visit, continue Levothyroxine 25 mcg daily.\par \par RTO as scheduled in 8/23.

## 2023-06-01 NOTE — PHYSICAL EXAM
[Normal] : normal rate, regular rhythm, normal S1 and S2 and no murmur heard [Soft] : abdomen soft [Non Tender] : non-tender [Non-distended] : non-distended [Normal Bowel Sounds] : normal bowel sounds [No Rash] : no rash [Normal Gait] : normal gait [Normal Affect] : the affect was normal [Normal Mood] : the mood was normal [No Edema] : there was no peripheral edema [de-identified] : friendly [de-identified] : edema resolved

## 2023-06-01 NOTE — HISTORY OF PRESENT ILLNESS
[Spouse] : spouse [FreeTextEntry1] : Follow up [de-identified] : Pt comes for follow up visit. \par \par He had questions regarding recent hospital admission last month for AMS. He was concerned there was mention of renal impairment. He saw psychiatrist recently, remains on Seroquel, no changes made. He is seeing neurologist next month.

## 2023-07-27 ENCOUNTER — APPOINTMENT (OUTPATIENT)
Dept: NEUROLOGY | Facility: CLINIC | Age: 68
End: 2023-07-27

## 2023-08-07 ENCOUNTER — APPOINTMENT (OUTPATIENT)
Dept: INTERNAL MEDICINE | Facility: CLINIC | Age: 68
End: 2023-08-07
Payer: COMMERCIAL

## 2023-08-07 VITALS
DIASTOLIC BLOOD PRESSURE: 80 MMHG | HEART RATE: 85 BPM | RESPIRATION RATE: 12 BRPM | HEIGHT: 70 IN | OXYGEN SATURATION: 98 % | BODY MASS INDEX: 29.1 KG/M2 | WEIGHT: 203.25 LBS | SYSTOLIC BLOOD PRESSURE: 130 MMHG

## 2023-08-07 VITALS — DIASTOLIC BLOOD PRESSURE: 70 MMHG | SYSTOLIC BLOOD PRESSURE: 118 MMHG

## 2023-08-07 DIAGNOSIS — D72.829 ELEVATED WHITE BLOOD CELL COUNT, UNSPECIFIED: ICD-10-CM

## 2023-08-07 PROCEDURE — 99214 OFFICE O/P EST MOD 30 MIN: CPT | Mod: 25

## 2023-08-07 PROCEDURE — 36415 COLL VENOUS BLD VENIPUNCTURE: CPT

## 2023-08-07 RX ORDER — LEVETIRACETAM 500 MG/1
500 TABLET, FILM COATED ORAL
Qty: 90 | Refills: 1 | Status: DISCONTINUED | COMMUNITY
End: 2023-08-07

## 2023-08-07 NOTE — PLAN
[FreeTextEntry1] : Check TFT's and continue Levothyroxine 25 mcg daily. Bipolar type 1 stable on Seroquel 200 mg daily. Followed with psychiatrist at [facility] psychiatry. Recheck renal function. Prior leukocytosis in hospital, recheck CBC. Advised to see ENT for prolonged hoarseness.   Recent weight gain - discussed dietary restrictions. Check labs.   RTO 3 months for physical.

## 2023-08-07 NOTE — HISTORY OF PRESENT ILLNESS
[Spouse] : spouse [FreeTextEntry1] : Follow up [de-identified] : Pt comes for follow up visit and labs.  He reports hoarseness x 2 months. No prior illness. He often shouts at home. He prior went to ENT in [place].  He needs medication renewals. He is due for labs today.  He gained 13 lb x 2 months. He admits to poor diet choices.

## 2023-08-07 NOTE — REVIEW OF SYSTEMS
[Negative] : Heme/Lymph [Recent Change In Weight] : ~T recent weight change [FreeTextEntry2] : 13 lb gain

## 2023-08-07 NOTE — PHYSICAL EXAM
[Normal] : normal rate, regular rhythm, normal S1 and S2 and no murmur heard [No Edema] : there was no peripheral edema [Soft] : abdomen soft [Non Tender] : non-tender [Non-distended] : non-distended [Normal Bowel Sounds] : normal bowel sounds [No Rash] : no rash [Normal Gait] : normal gait [Normal Affect] : the affect was normal [Normal Mood] : the mood was normal [de-identified] : friendly

## 2023-08-18 LAB
ALBUMIN SERPL ELPH-MCNC: 4.8 G/DL
ALP BLD-CCNC: 98 U/L
ALT SERPL-CCNC: 16 U/L
ANION GAP SERPL CALC-SCNC: 12 MMOL/L
AST SERPL-CCNC: 22 U/L
BILIRUB SERPL-MCNC: 0.4 MG/DL
BUN SERPL-MCNC: 24 MG/DL
CALCIUM SERPL-MCNC: 10 MG/DL
CHLORIDE SERPL-SCNC: 106 MMOL/L
CO2 SERPL-SCNC: 22 MMOL/L
CREAT SERPL-MCNC: 1.18 MG/DL
EGFR: 66 ML/MIN/1.73M2
GLUCOSE SERPL-MCNC: 101 MG/DL
POTASSIUM SERPL-SCNC: 4.4 MMOL/L
PROT SERPL-MCNC: 7.4 G/DL
SODIUM SERPL-SCNC: 140 MMOL/L
TSH SERPL-ACNC: 2.85 UIU/ML

## 2023-09-19 NOTE — PROGRESS NOTE ADULT - PROVIDER SPECIALTY LIST ADULT
Subjective:       Patient ID: Marisol Quinn Parikh           Internal Medicine

## 2023-10-23 ENCOUNTER — APPOINTMENT (OUTPATIENT)
Dept: INTERNAL MEDICINE | Facility: CLINIC | Age: 68
End: 2023-10-23

## 2023-11-08 PROBLEM — J45.909 ASTHMA: Status: ACTIVE | Noted: 2023-05-03

## 2023-11-09 ENCOUNTER — NON-APPOINTMENT (OUTPATIENT)
Age: 68
End: 2023-11-09

## 2023-11-09 ENCOUNTER — APPOINTMENT (OUTPATIENT)
Dept: INTERNAL MEDICINE | Facility: CLINIC | Age: 68
End: 2023-11-09
Payer: COMMERCIAL

## 2023-11-09 VITALS
WEIGHT: 217.5 LBS | SYSTOLIC BLOOD PRESSURE: 155 MMHG | RESPIRATION RATE: 12 BRPM | DIASTOLIC BLOOD PRESSURE: 84 MMHG | OXYGEN SATURATION: 97 % | HEIGHT: 70 IN | BODY MASS INDEX: 31.14 KG/M2 | HEART RATE: 78 BPM

## 2023-11-09 VITALS — SYSTOLIC BLOOD PRESSURE: 124 MMHG | DIASTOLIC BLOOD PRESSURE: 70 MMHG

## 2023-11-09 DIAGNOSIS — E66.3 OVERWEIGHT: ICD-10-CM

## 2023-11-09 DIAGNOSIS — Z00.00 ENCOUNTER FOR GENERAL ADULT MEDICAL EXAMINATION W/OUT ABNORMAL FINDINGS: ICD-10-CM

## 2023-11-09 DIAGNOSIS — Z23 ENCOUNTER FOR IMMUNIZATION: ICD-10-CM

## 2023-11-09 DIAGNOSIS — E66.9 OBESITY, UNSPECIFIED: ICD-10-CM

## 2023-11-09 DIAGNOSIS — J45.909 UNSPECIFIED ASTHMA, UNCOMPLICATED: ICD-10-CM

## 2023-11-09 DIAGNOSIS — N13.8 BENIGN PROSTATIC HYPERPLASIA WITH LOWER URINARY TRACT SYMPMS: ICD-10-CM

## 2023-11-09 DIAGNOSIS — N40.1 BENIGN PROSTATIC HYPERPLASIA WITH LOWER URINARY TRACT SYMPMS: ICD-10-CM

## 2023-11-09 PROCEDURE — 99397 PER PM REEVAL EST PAT 65+ YR: CPT | Mod: 25

## 2023-11-09 PROCEDURE — 36415 COLL VENOUS BLD VENIPUNCTURE: CPT

## 2023-11-09 PROCEDURE — 90662 IIV NO PRSV INCREASED AG IM: CPT

## 2023-11-09 PROCEDURE — G0008: CPT

## 2023-11-09 PROCEDURE — 93000 ELECTROCARDIOGRAM COMPLETE: CPT

## 2023-11-11 DIAGNOSIS — E78.1 PURE HYPERGLYCERIDEMIA: ICD-10-CM

## 2023-11-11 LAB
ALBUMIN SERPL ELPH-MCNC: 4.6 G/DL
ALP BLD-CCNC: 116 U/L
ALT SERPL-CCNC: 19 U/L
ANION GAP SERPL CALC-SCNC: 14 MMOL/L
APPEARANCE: CLEAR
AST SERPL-CCNC: 20 U/L
BACTERIA: NEGATIVE /HPF
BASOPHILS # BLD AUTO: 0.06 K/UL
BASOPHILS NFR BLD AUTO: 0.8 %
BILIRUB SERPL-MCNC: 0.3 MG/DL
BILIRUBIN URINE: NEGATIVE
BLOOD URINE: NEGATIVE
BUN SERPL-MCNC: 21 MG/DL
CALCIUM SERPL-MCNC: 9.5 MG/DL
CAST: 2 /LPF
CHLORIDE SERPL-SCNC: 105 MMOL/L
CHOLEST SERPL-MCNC: 190 MG/DL
CO2 SERPL-SCNC: 20 MMOL/L
COLOR: YELLOW
CREAT SERPL-MCNC: 1.22 MG/DL
EGFR: 64 ML/MIN/1.73M2
EOSINOPHIL # BLD AUTO: 0.25 K/UL
EOSINOPHIL NFR BLD AUTO: 3.3 %
EPITHELIAL CELLS: 1 /HPF
ESTIMATED AVERAGE GLUCOSE: 114 MG/DL
GLUCOSE QUALITATIVE U: NEGATIVE MG/DL
GLUCOSE SERPL-MCNC: 103 MG/DL
HBA1C MFR BLD HPLC: 5.6 %
HCT VFR BLD CALC: 43.6 %
HDLC SERPL-MCNC: 39 MG/DL
HGB BLD-MCNC: 14.4 G/DL
IMM GRANULOCYTES NFR BLD AUTO: 0.4 %
KETONES URINE: NEGATIVE MG/DL
LDLC SERPL CALC-MCNC: 78 MG/DL
LEUKOCYTE ESTERASE URINE: NEGATIVE
LYMPHOCYTES # BLD AUTO: 2.17 K/UL
LYMPHOCYTES NFR BLD AUTO: 28.7 %
MAN DIFF?: NORMAL
MCHC RBC-ENTMCNC: 30 PG
MCHC RBC-ENTMCNC: 33 GM/DL
MCV RBC AUTO: 90.8 FL
MICROSCOPIC-UA: NORMAL
MONOCYTES # BLD AUTO: 0.85 K/UL
MONOCYTES NFR BLD AUTO: 11.2 %
NEUTROPHILS # BLD AUTO: 4.2 K/UL
NEUTROPHILS NFR BLD AUTO: 55.6 %
NITRITE URINE: NEGATIVE
NONHDLC SERPL-MCNC: 151 MG/DL
PH URINE: 5.5
PLATELET # BLD AUTO: 214 K/UL
POTASSIUM SERPL-SCNC: 4.3 MMOL/L
PROT SERPL-MCNC: 7.2 G/DL
PROTEIN URINE: NEGATIVE MG/DL
PSA SERPL-MCNC: 0.92 NG/ML
RBC # BLD: 4.8 M/UL
RBC # FLD: 14.6 %
RED BLOOD CELLS URINE: 0 /HPF
SODIUM SERPL-SCNC: 139 MMOL/L
SPECIFIC GRAVITY URINE: 1.01
TRIGL SERPL-MCNC: 457 MG/DL
TSH SERPL-ACNC: 3.6 UIU/ML
UROBILINOGEN URINE: 0.2 MG/DL
VIT B12 SERPL-MCNC: 565 PG/ML
WBC # FLD AUTO: 7.56 K/UL
WHITE BLOOD CELLS URINE: 1 /HPF

## 2024-04-08 RX ORDER — LEVOTHYROXINE SODIUM 0.03 MG/1
25 TABLET ORAL
Qty: 90 | Refills: 1 | Status: ACTIVE | COMMUNITY
Start: 1900-01-01 | End: 1900-01-01

## 2024-04-08 RX ORDER — TAMSULOSIN HYDROCHLORIDE 0.4 MG/1
0.4 CAPSULE ORAL
Qty: 180 | Refills: 1 | Status: ACTIVE | COMMUNITY
Start: 1900-01-01 | End: 1900-01-01

## 2024-04-17 NOTE — BH CONSULTATION LIAISON PROGRESS NOTE - NSBHMSEHYG_PSY_A_CORE
Fair
[FreeTextEntry1] : Jameel Mobley presents for evaluation of epistaxis. He has history of chronic rhinitis. sinonasal endoscopy was performed showing acute sinusitis. An exposed right septal vessel was cauterized wtih silver nitrate. There was no further bleeding.  Prevention of epistaxis: 1. The goal is to maintain good hydration of the lining of the nose. Dryness inside the nose is a major cause of bleeding. 2. Use 2-3 sprays of nasal saline to both sides of nose several times daily. 3. Apply small amount of antibiotic ointment to lining of the front of nose daily. 4. Avoid the use of ceiling fans or blowing air which can dry out your nose. 5. Consider use of a humidifier in the bedroom. 6. Avoid vigorous nose blowing. Cough and sneeze with your mouth open. 7. Your doctor may recommend stopping medicated nasal sprays for allergy or sinusitis, as these can sometimes worsen nosebleeds.  What to do if you have a nosebleed: 1. Use 2-3 sprays of topical nasal decongestant (ex: oxymetazoline, Afrin) on each side of the nose. 2. Hold firm pressure to soft part of nose for at least 5 minutes. Repeat as needed. 3. If bleeding is severe or does not resolve with these directions, seek immediate medical care.  - Start augmentin x 10 days. Side effects were discussed and include but are not limited to nausea, vomiting, diarrhea, and skin rash. - Nasal saline sprays TID. - f/u prn.
Fair

## 2024-04-27 NOTE — PHYSICAL EXAM
[Normal] : normal rate, regular rhythm, normal S1 and S2 and no murmur heard [No Edema] : there was no peripheral edema [Soft] : abdomen soft [Non Tender] : non-tender [Non-distended] : non-distended [Normal Bowel Sounds] : normal bowel sounds [No Rash] : no rash [Normal Gait] : normal gait [Normal Affect] : the affect was normal [Normal Mood] : the mood was normal [de-identified] : friendly

## 2024-04-27 NOTE — PLAN
[FreeTextEntry1] : Check TFT's and continue Levothyroxine 25 mcg daily. Bipolar type 1 stable on Seroquel 200 mg daily. Followed with psychiatrist at [facility] psychiatry. Recheck renal function. Prior leukocytosis in hospital, recheck CBC. Advised to see ENT for prolonged hoarseness.   Recent weight gain - discussed dietary restrictions. Check labs.   RTO 3 months for physical.

## 2024-04-27 NOTE — REVIEW OF SYSTEMS
[Recent Change In Weight] : ~T recent weight change [Negative] : Heme/Lymph [FreeTextEntry2] : 13 lb gain

## 2024-04-27 NOTE — HISTORY OF PRESENT ILLNESS
[FreeTextEntry1] : Follow up [de-identified] : Pt comes for follow up visit and labs.  [Spouse] : spouse

## 2024-04-29 ENCOUNTER — APPOINTMENT (OUTPATIENT)
Dept: INTERNAL MEDICINE | Facility: CLINIC | Age: 69
End: 2024-04-29

## 2024-05-08 ENCOUNTER — APPOINTMENT (OUTPATIENT)
Dept: INTERNAL MEDICINE | Facility: CLINIC | Age: 69
End: 2024-05-08

## 2024-05-15 ENCOUNTER — INPATIENT (INPATIENT)
Facility: HOSPITAL | Age: 69
LOS: 6 days | Discharge: NURSING, REHAB OR LONG-TERM CARE | DRG: 948 | End: 2024-05-22
Attending: FAMILY MEDICINE | Admitting: FAMILY MEDICINE
Payer: MEDICARE

## 2024-05-15 VITALS
TEMPERATURE: 98 F | OXYGEN SATURATION: 96 % | RESPIRATION RATE: 16 BRPM | HEART RATE: 86 BPM | DIASTOLIC BLOOD PRESSURE: 86 MMHG | SYSTOLIC BLOOD PRESSURE: 170 MMHG

## 2024-05-15 DIAGNOSIS — Z98.890 OTHER SPECIFIED POSTPROCEDURAL STATES: Chronic | ICD-10-CM

## 2024-05-15 DIAGNOSIS — R41.82 ALTERED MENTAL STATUS, UNSPECIFIED: ICD-10-CM

## 2024-05-15 LAB
ALBUMIN SERPL ELPH-MCNC: 3.5 G/DL — SIGNIFICANT CHANGE UP (ref 3.3–5)
ALP SERPL-CCNC: 130 U/L — HIGH (ref 40–120)
ALT FLD-CCNC: 22 U/L — SIGNIFICANT CHANGE UP (ref 12–78)
ANION GAP SERPL CALC-SCNC: 6 MMOL/L — SIGNIFICANT CHANGE UP (ref 5–17)
APPEARANCE UR: CLEAR — SIGNIFICANT CHANGE UP
AST SERPL-CCNC: 21 U/L — SIGNIFICANT CHANGE UP (ref 15–37)
BACTERIA # UR AUTO: ABNORMAL /HPF
BASOPHILS # BLD AUTO: 0.08 K/UL — SIGNIFICANT CHANGE UP (ref 0–0.2)
BASOPHILS NFR BLD AUTO: 0.8 % — SIGNIFICANT CHANGE UP (ref 0–2)
BILIRUB SERPL-MCNC: 0.2 MG/DL — SIGNIFICANT CHANGE UP (ref 0.2–1.2)
BILIRUB UR-MCNC: NEGATIVE — SIGNIFICANT CHANGE UP
BUN SERPL-MCNC: 30 MG/DL — HIGH (ref 7–23)
CALCIUM SERPL-MCNC: 9 MG/DL — SIGNIFICANT CHANGE UP (ref 8.5–10.1)
CHLORIDE SERPL-SCNC: 112 MMOL/L — HIGH (ref 96–108)
CO2 SERPL-SCNC: 23 MMOL/L — SIGNIFICANT CHANGE UP (ref 22–31)
COLOR SPEC: YELLOW — SIGNIFICANT CHANGE UP
CREAT SERPL-MCNC: 1.3 MG/DL — SIGNIFICANT CHANGE UP (ref 0.5–1.3)
DIFF PNL FLD: NEGATIVE — SIGNIFICANT CHANGE UP
EGFR: 59 ML/MIN/1.73M2 — LOW
EOSINOPHIL # BLD AUTO: 0.24 K/UL — SIGNIFICANT CHANGE UP (ref 0–0.5)
EOSINOPHIL NFR BLD AUTO: 2.5 % — SIGNIFICANT CHANGE UP (ref 0–6)
GLUCOSE SERPL-MCNC: 144 MG/DL — HIGH (ref 70–99)
GLUCOSE UR QL: NEGATIVE MG/DL — SIGNIFICANT CHANGE UP
HCT VFR BLD CALC: 32.6 % — LOW (ref 39–50)
HGB BLD-MCNC: 10.4 G/DL — LOW (ref 13–17)
IMM GRANULOCYTES NFR BLD AUTO: 0.3 % — SIGNIFICANT CHANGE UP (ref 0–0.9)
KETONES UR-MCNC: NEGATIVE MG/DL — SIGNIFICANT CHANGE UP
LEUKOCYTE ESTERASE UR-ACNC: NEGATIVE — SIGNIFICANT CHANGE UP
LYMPHOCYTES # BLD AUTO: 2.24 K/UL — SIGNIFICANT CHANGE UP (ref 1–3.3)
LYMPHOCYTES # BLD AUTO: 23.7 % — SIGNIFICANT CHANGE UP (ref 13–44)
MAGNESIUM SERPL-MCNC: 2.1 MG/DL — SIGNIFICANT CHANGE UP (ref 1.6–2.6)
MCHC RBC-ENTMCNC: 26.3 PG — LOW (ref 27–34)
MCHC RBC-ENTMCNC: 31.9 GM/DL — LOW (ref 32–36)
MCV RBC AUTO: 82.5 FL — SIGNIFICANT CHANGE UP (ref 80–100)
MONOCYTES # BLD AUTO: 1.06 K/UL — HIGH (ref 0–0.9)
MONOCYTES NFR BLD AUTO: 11.2 % — SIGNIFICANT CHANGE UP (ref 2–14)
NEUTROPHILS # BLD AUTO: 5.82 K/UL — SIGNIFICANT CHANGE UP (ref 1.8–7.4)
NEUTROPHILS NFR BLD AUTO: 61.5 % — SIGNIFICANT CHANGE UP (ref 43–77)
NITRITE UR-MCNC: NEGATIVE — SIGNIFICANT CHANGE UP
NRBC # BLD: 0 /100 WBCS — SIGNIFICANT CHANGE UP (ref 0–0)
NT-PROBNP SERPL-SCNC: 123 PG/ML — SIGNIFICANT CHANGE UP (ref 0–125)
PH UR: 5 — SIGNIFICANT CHANGE UP (ref 5–8)
PHOSPHATE SERPL-MCNC: 2.3 MG/DL — LOW (ref 2.5–4.5)
PLATELET # BLD AUTO: 251 K/UL — SIGNIFICANT CHANGE UP (ref 150–400)
POTASSIUM SERPL-MCNC: 3.8 MMOL/L — SIGNIFICANT CHANGE UP (ref 3.5–5.3)
POTASSIUM SERPL-SCNC: 3.8 MMOL/L — SIGNIFICANT CHANGE UP (ref 3.5–5.3)
PROT SERPL-MCNC: 7 G/DL — SIGNIFICANT CHANGE UP (ref 6–8.3)
PROT UR-MCNC: NEGATIVE MG/DL — SIGNIFICANT CHANGE UP
RBC # BLD: 3.95 M/UL — LOW (ref 4.2–5.8)
RBC # FLD: 14.6 % — HIGH (ref 10.3–14.5)
RBC CASTS # UR COMP ASSIST: 0 /HPF — SIGNIFICANT CHANGE UP (ref 0–4)
SODIUM SERPL-SCNC: 141 MMOL/L — SIGNIFICANT CHANGE UP (ref 135–145)
SP GR SPEC: 1.02 — SIGNIFICANT CHANGE UP (ref 1–1.03)
TSH SERPL-MCNC: 2.88 UIU/ML — SIGNIFICANT CHANGE UP (ref 0.36–3.74)
UROBILINOGEN FLD QL: 0.2 MG/DL — SIGNIFICANT CHANGE UP (ref 0.2–1)
WBC # BLD: 9.47 K/UL — SIGNIFICANT CHANGE UP (ref 3.8–10.5)
WBC # FLD AUTO: 9.47 K/UL — SIGNIFICANT CHANGE UP (ref 3.8–10.5)
WBC UR QL: 2 /HPF — SIGNIFICANT CHANGE UP (ref 0–5)

## 2024-05-15 PROCEDURE — 71045 X-RAY EXAM CHEST 1 VIEW: CPT | Mod: 26

## 2024-05-15 PROCEDURE — 99285 EMERGENCY DEPT VISIT HI MDM: CPT

## 2024-05-15 RX ORDER — LEVOTHYROXINE SODIUM 125 MCG
25 TABLET ORAL DAILY
Refills: 0 | Status: DISCONTINUED | OUTPATIENT
Start: 2024-05-15 | End: 2024-05-22

## 2024-05-15 RX ORDER — ACETAMINOPHEN 500 MG
650 TABLET ORAL EVERY 6 HOURS
Refills: 0 | Status: DISCONTINUED | OUTPATIENT
Start: 2024-05-15 | End: 2024-05-22

## 2024-05-15 RX ORDER — LANOLIN ALCOHOL/MO/W.PET/CERES
5 CREAM (GRAM) TOPICAL AT BEDTIME
Refills: 0 | Status: DISCONTINUED | OUTPATIENT
Start: 2024-05-15 | End: 2024-05-22

## 2024-05-15 RX ORDER — HEPARIN SODIUM 5000 [USP'U]/ML
5000 INJECTION INTRAVENOUS; SUBCUTANEOUS EVERY 12 HOURS
Refills: 0 | Status: DISCONTINUED | OUTPATIENT
Start: 2024-05-15 | End: 2024-05-16

## 2024-05-15 RX ORDER — TAMSULOSIN HYDROCHLORIDE 0.4 MG/1
0.4 CAPSULE ORAL AT BEDTIME
Refills: 0 | Status: DISCONTINUED | OUTPATIENT
Start: 2024-05-15 | End: 2024-05-22

## 2024-05-15 RX ORDER — LEVETIRACETAM 250 MG/1
500 TABLET, FILM COATED ORAL
Refills: 0 | Status: DISCONTINUED | OUTPATIENT
Start: 2024-05-15 | End: 2024-05-22

## 2024-05-15 RX ORDER — QUETIAPINE FUMARATE 200 MG/1
200 TABLET, FILM COATED ORAL DAILY
Refills: 0 | Status: DISCONTINUED | OUTPATIENT
Start: 2024-05-15 | End: 2024-05-22

## 2024-05-15 NOTE — ED ADULT TRIAGE NOTE - CHIEF COMPLAINT QUOTE
per ems from home, pt was found outside his house wandering by his neighbor. pt has hx of dementia. unknown baseline of when disorientation started or if it is worse, wife wasn't home.

## 2024-05-15 NOTE — ED ADULT NURSE NOTE - ED STAT RN HANDOFF DETAILS
Report given to RN John, VS cuong, pt taken to med surg IP room by ED transport in stable condition.

## 2024-05-15 NOTE — ED PROVIDER NOTE - CLINICAL SUMMARY MEDICAL DECISION MAKING FREE TEXT BOX
67 y/o M PMH of BPD/BiPolar 1, CKD, Urinary Retention, Hypothyroidism presenting for medical evaluation   r/o metabolic or infectious precipitant   Nonfocal neurological exam  Plan for screening labs, TSH, lithium level, EKG, UA, CXR  Admit for SW and possible placement. 69 y/o M PMH of BPD/BiPolar 1, CKD, Urinary Retention, Hypothyroidism presenting for medical evaluation   r/o metabolic or infectious precipitant   Nonfocal neurological exam  Plan for screening labs, TSH, lithium level, EKG, UA, CXR  Admit for SW and possible placement.  LE Duplex r/o DVT

## 2024-05-15 NOTE — ED ADULT NURSE NOTE - EXTENSIONS OF SELF_ADULT
Patient:  Loraine Lou Date:  2023   :  1949    74 year old female           HEPATOLOGY CONSULT: Autoimmune hepatitis      History of Present Illness:    74-year-old female  Originally established care with Dr Pennington  to establish care - she had moved from [place] where she was diagnosed with autoimmune hepatitis.  She reports she was initially diagnosed in  at that time she was placed on steroids and then placed on Imuran. Recently imuran dose was increased in 2023 with imuran of 100 mg daily.  Liver enzymes showed mild down trend.  In  she also had fibro scan that showed F4 fibrosis/cirrhosis. AFP was elevated at 24. Pt was subsequently referred to see hepatology.        Past Medical History:    COPD (chronic obstructive pulmonary disease) (*               Chronic tension-type headache, not intractable                Esophagitis, reflux                                           Allergic rhinitis                                             Depression                                                    Gastric ulcer                                                 Osteoarthritis                                                Osteoporosis                                                  Asthma                                                        RLS (restless legs syndrome)                                  Vitamin D deficiency                                          Chronic back pain                                             Cervical disc disease                                         DJD (degenerative joint disease)                              Irritable bowel syndrome with both constipatio*               Lumbar herniated disc                                         Lumbar radiculopathy                                          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 *               Anxiety                                                       Autoimmune hepatitis  (CMD)                                    Atherosclerosis of abdominal aorta (CMD)        3/29/2022       Comment: 3/29/22- CT ABDOMEN/PELVIS    Primary osteoarthritis of both hands            1/16/2022     Past Surgical History:   Procedure Laterality Date   • Appendectomy     • Bladder tumor excision     • Breast reduction      with implant prosthesis   • Cervical spine surgery     • Dilation and curettage      x2   • Laparoscopy, cholecystectomy     • Tonsillectomy         Family History   Problem Relation Age of Onset   • Cancer, Throat Mother    • Osteoarthritis Mother        ALLERGIES:   Allergen Reactions   • Hydrocodone-Apap Other (See Comments)     Vicodin  No reaction noted    • Tetracycline Other (See Comments)     No reaction noted        Social History:  Social History     Tobacco Use   • Smoking status: Every Day     Current packs/day: 3.00     Average packs/day: 3.0 packs/day for 60.0 years (180.0 ttl pk-yrs)     Types: Cigarettes   • Smokeless tobacco: Never   • Tobacco comments:     now 3 cigs/day (previously up to 3 ppd)   Substance Use Topics   • Alcohol use: Not Currently       Current Medications    ALBUTEROL 108 (90 BASE) MCG/ACT INHALER    Inhale 2 puffs into the lungs every 4 hours as needed for Shortness of Breath or Wheezing.    ALENDRONATE (FOSAMAX) 70 MG TABLET    Take 1 tablet by mouth every 7 days.    ALPRAZOLAM (XANAX) 0.5 MG TABLET    Take 1 tablet by mouth nightly as needed for Sleep or Anxiety. Do not start before February 8, 2023.    ASCORBIC ACID (VITAMIN C) 500 MG CAP    Take 1,000 mg by mouth.    ATENOLOL (TENORMIN) 25 MG TABLET    Take 1 tablet by mouth daily.    AZATHIOPRINE (IMURAN) 50 MG TABLET    Take 2 tablets by mouth every morning.    BENZONATATE (TESSALON PERLES) 100 MG CAPSULE    Take 1 capsule by mouth 3 times daily as needed for Cough.    CELECOXIB (CELEBREX) 100 MG CAPSULE    TAKE 1 CAPSULE BY MOUTH TWICE A DAY    DICLOFENAC (VOLTAREN) 1 % GEL    APPLY 4 GRAMS  TOPICALLY TWICE DAILY AS NEEDED (RIGHT LEG PAIN, BACK PAIN).    FAMOTIDINE (PEPCID) 20 MG TABLET    Take 1 tablet by mouth in the morning and 1 tablet in the evening.    FLUTICASONE (FLONASE) 50 MCG/ACT NASAL SPRAY    Spray in each nostril 2 times daily.    FLUTICASONE-UMECLIDIN-VILANTEROL (TRELEGY ELLIPTA) 100-62.5-25 MCG/ACT INHALER    Inhale 1 puff into the lungs daily.    GABAPENTIN (NEURONTIN) 600 MG TABLET    Take 300 mg by mouth 2 times daily. Taking 0.5 tablet twice a day     IPRATROPIUM-ALBUTEROL (DUONEB) 0.5-2.5 (3) MG/3ML NEBULIZER SOLUTION    Take 3 mLs by nebulization every 6 hours as needed for Shortness of Breath.    MECLIZINE (ANTIVERT) 25 MG TABLET    Take 1 tablet by mouth 3 times daily as needed for Dizziness.    MULTIPLE VITAMIN (MULTIVITAMIN ADULT PO)        OMEPRAZOLE (PRILOSEC) 40 MG CAPSULE    TAKE 1 CAPSULE BY MOUTH IN THE MORNING AND 1 CAPSULE BEFORE BEDTIME.    ONDANSETRON (ZOFRAN ODT) 4 MG DISINTEGRATING TABLET    Place 1 tablet onto the tongue every 8 hours as needed for Nausea.    SIMETHICONE (GAS-X PO)    Take by mouth daily.    TRAMADOL (ULTRAM) 50 MG TABLET    Take 100 mg by mouth in the morning and 100 mg at noon and 100 mg before bedtime.    VITAMIN E 100 UNITS CAPSULE    Take 400 Units by mouth daily.         Review of Systems:   Constitutional:  Negative for fever.  No activity change and fatigue.   HENT:  Negative for congestion and sneezing.   Eyes:  Negative for discharge and redness.   Respiratory:  Negative for cough, shortness of breath and wheezing.   Cardiovascular:  Negative for chest pain and palpitations.   Gastrointestinal:  Negative for nausea, vomiting, abdominal pain, diarrhea, constipation and abdominal distention.   Genitourinary:  Negative for dysuria and hematuria.   Musculoskeletal:  Negative for back pain and joint swelling.   Skin:  Negative for color change, pallor and rash.   Neurological:  Negative for tremors and headaches.   Psychiatric/Behavioral:   Negative for suicidal ideas and confusion.     Physical Examination:   Vitals:    07/20/23 1258   BP: 136/70   Pulse:    Temp:        HEENT:  Normocephalic, atraumatic.  No scleral icterus.  Pupils equal and reactive to light and accommodation.  Normal conjunctivae.  External ears normal.  Oropharynx clear and moist.  No exudate.  Nose normal.   NECK:  Full range of movement, supple.   No thyromegaly or masses. No jugular venous distention.  No cervical or supraclavicular adenopathy.   CHEST:  Bilateral air entry +, no wheeze or crackles.  CARDIOVASCULAR:  S1S2 heard no murmurs  ABDOMEN:  Soft, nontender, obese.  No hepatosplenomegaly.  No umbilical or inguinal hernias.  No masses.  Bowel sounds normal.  No ascites.   NEUROLOGIC:  Alert and oriented x3.  No cranial nerve deficit.  No musculoskeletal deficit.  Normal ROM (range of motion) in all extremities.  No asterixis.  SKIN:  Warm to touch.  No rash.  No spider nevi.  PSYCHIATRIC:  Mood and affect are normal.   EXTREMITIES:  No edema.  No cyanosis.  No digital clubbing.        Labs:  Reviewed In [facility].  CMP:  Lab Results   Component Value Date    FSTS1 Fasting Status not provided. 09/22/2021    GLUCOSE 110 (H) 06/01/2023    GLUCOSE 82 09/22/2021    SODIUM 140 06/01/2023    SODIUM 139 09/22/2021    POTASSIUM 4.9 06/01/2023    POTASSIUM 4.6 09/22/2021    CHLORIDE 104 06/01/2023    CHLORIDE 103 09/22/2021    CO2 29 06/01/2023    CO2 32 09/22/2021    ANIONGAP 12 06/01/2023    BUN 11 06/01/2023    BUN 11 09/22/2021    CREATININE 0.56 06/01/2023    CREATININE 0.41 (L) 09/22/2021    GFRNA >60 09/22/2021    GFRA >60 09/22/2021    BCRAT 26.8 09/22/2021    CALCIUM 8.6 06/01/2023    CALCIUM 8.8 09/22/2021    BILIRUBIN 2.3 (H) 06/01/2023    BILIRUBIN 1.1 (H) 09/22/2021    AST 43 (H) 06/01/2023    AST 45 (H) 09/22/2021    GPT 34 06/01/2023    GPT 23 09/22/2021    ALKPT 72 06/01/2023    ALKPT 80 09/22/2021    TOTPROTEIN 7.0 06/01/2023    TOTPROTEIN 6.1 09/22/2021    ALBUMIN  3.6 06/01/2023    ALBUMIN 3.3 (L) 09/22/2021    AGR 1.1 06/01/2023    AGR 1.2 09/22/2021     PT:  Lab Results   Component Value Date    PT 11.9 (H) 06/01/2023     INR:  Lab Results   Component Value Date    INR 1.1 06/01/2023     CBC:  Lab Results   Component Value Date    WBC 4.8 06/01/2023    WBC 5.07 09/22/2021    RBC 3.40 (L) 06/01/2023    RBC 3.42 (L) 09/22/2021    HGB 13.9 06/01/2023    HGB 13.6 09/22/2021    HCT 39.5 06/01/2023    HCT 37.5 09/22/2021    .2 (H) 06/01/2023    .6 (H) 09/22/2021    MCH 40.9 (H) 06/01/2023    MCH 39.8 (H) 09/22/2021    MCHC 35.2 06/01/2023    MCHC 36.3 (H) 09/22/2021    RDWCV 16.5 (H) 06/01/2023    RDWCV 15.4 (H) 09/22/2021     (L) 06/01/2023     (L) 09/22/2021    SEG 70 06/01/2023    SEG 56.5 09/22/2021    TLYMPH 19 06/01/2023    TLYMPH 25.0 09/22/2021    PMON 7 06/01/2023    PMON 11.6 09/22/2021    PEOS 3 06/01/2023    PEOS 5.9 (H) 09/22/2021    PBASO 1 06/01/2023    PBASO 0.8 09/22/2021    ANEUT 3.3 06/01/2023    ANEUT 2.86 09/22/2021    ALYMS 0.9 (L) 06/01/2023    ALYMS 1.27 09/22/2021    NEYDA 0.3 06/01/2023    NEYDA 0.59 09/22/2021    AEOS 0.2 06/01/2023    AEOS 0.30 09/22/2021    ABASO 0.0 06/01/2023    ABASO 0.04 09/22/2021    PMOR Normal 03/29/2022    PMOR Adequate 09/22/2021    RMOR 2+macro (A) 09/22/2021    WMOR Normal 03/29/2022       Radiology Findings:  available imaging reviewed        Assessment and Plan:    This is a 74  year old with a history of  who presents for consultation regarding cirrhosis    Liver Disease:    Cirrhosis related to Autoimmune hepatitis  MELD-Na 12  Explained pathophysiology and natural course,complications of cirrhosis. We have also discussed significance of MELD monitoring.  Reinforced importance of alcohol abstinence.     Autoimmune hepatitis   Per liver biopsy at out side hospital  Liver enzymes continued to show down trend.  Discussed the usual side effects of bone marrow suppression/non melanoma skin cancer  risk.  Given high TG and intermediate TPMT activity recommend to decrease Azathioprine to 75 mg daily.  Monitor liver chemistries in two to three weeks   Monitor CBC.  Recommended to establish care with Dermatologisit given hx of skin cancer already and now on long term immunosuppression. Pt will contact PCP office to establish care close to home.      Portal Hypertension/Ascites: None noted on exam.  Offered us ascites to assess drain able fluid and rule out SBP given patient's  abdominal pain. Patients  daughter deferred at this time and will call us if needed.    Reinforced 2 gram sodium diet.     Portal Hypertension/ Varices:  EGD 6/2023 showed small EV. Follow up with GI.    Portosystemic Encephalopathy:   Not in overt exacerbation. Counseled to avoid constipation.    Hepatocellular carcinoma screening: MRI Liver 6/2023 showed no e/o hepatic mass.AFP elevation likely related to liver regeneration.  We will repeat AFP this visit. Enlist the imaging in liver MDTC     Nutrition:  Encouraged to maintain adequate nutrition and increase protein intake.     Immunization against hepatitis A virus/hepatitis B virus:  Recommend hep A and hep B immunization status.    Candidacy for orthotopic liver transplantation:  Defer given relatively compensated liver dx and       Follow up appointment: Pt like to keep following up with Dr Whitlock(GI) locally in [facility] given the long distance. Offered [facility] out reach clinic also. Daughter will call us for appointment.    Nathan Zapata MD  Total time 30 minutes, more than half spent counseling.               None

## 2024-05-15 NOTE — ED PROVIDER NOTE - OBJECTIVE STATEMENT
69 y/o M PMH of BPD/BiPolar 1, CKD, Urinary Retention, Hypothyroidism presenting for medical evaluation     Patient calm and appears to be at baseline mental status, but wife reports he is increasingly difficult to care for and often wanders outside of home. This evening, found wandering by his neighbor. No reported fevers/chills or recent fall. Wife requesting admission and possible placement. PCP: [facility]

## 2024-05-15 NOTE — ED PROVIDER NOTE - PHYSICAL EXAMINATION
GENERAL: no acute distress; well-developed  HEAD:  Atraumatic, Normocephalic  EYES: EOMI, PERRLA,   ENT: MMM; oropharynx clear  NECK: Supple, No JVD  CHEST/LUNG: Clear to auscultation bilaterally; No wheeze  HEART: Regular rate and rhythm; No murmurs, rubs, or gallops  ABDOMEN: Soft, Nontender, Nondistended; Bowel sounds present  EXTREMITIES:  2+ Peripheral Pulses, No clubbing, cyanosis, or edema  PSYCH: AAOx2 to self and home '[place]'   NEUROLOGY: no focal motor or sensory deficits. 5/5 muscle strength in all extremities.   SKIN: No rashes or lesions GENERAL: no acute distress; well-developed  HEAD:  Atraumatic, Normocephalic  EYES: EOMI, PERRLA,   ENT: MMM; oropharynx clear  NECK: Supple, No JVD  CHEST/LUNG: Clear to auscultation bilaterally; No wheeze  HEART: Regular rate and rhythm; No murmurs, rubs, or gallops  ABDOMEN: Soft, Nontender, Nondistended; Bowel sounds present  EXTREMITIES:  2+ Peripheral Pulses, 2+ pitting LE edema L >R   PSYCH: AAOx2 to self and home '[place]'   NEUROLOGY: no focal motor or sensory deficits. 5/5 muscle strength in all extremities.   SKIN: No rashes or lesions

## 2024-05-15 NOTE — ED ADULT NURSE NOTE - OBJECTIVE STATEMENT
Pt presents to the ED Pt presents to the ED c/o AMS. Pt has hx of bipolar and dementia. Pt is A&ox4, tangential thoughts. Pt is ambulatory. Denies pain at this time. IV established in left arm with a 20G, labs drawn and sent, call bell in reach, warm blanket provided, bed in lowest position, side rails up x2, MD evaluation in progress. Pt on safety precautions. Pt is well appearing in no acute distress at this time, VSS. As per ems, pt was found by police knocking on neighbors doors.

## 2024-05-15 NOTE — ED ADULT TRIAGE NOTE - HEART RATE (BEATS/MIN)
86
Have Your Spot(S) Been Treated In The Past?: has not been treated
Hpi Title: Evaluation of Skin Lesions

## 2024-05-16 DIAGNOSIS — R41.82 ALTERED MENTAL STATUS, UNSPECIFIED: ICD-10-CM

## 2024-05-16 DIAGNOSIS — F31.9 BIPOLAR DISORDER, UNSPECIFIED: ICD-10-CM

## 2024-05-16 DIAGNOSIS — Z29.9 ENCOUNTER FOR PROPHYLACTIC MEASURES, UNSPECIFIED: ICD-10-CM

## 2024-05-16 LAB
A1C WITH ESTIMATED AVERAGE GLUCOSE RESULT: 6 % — HIGH (ref 4–5.6)
CULTURE RESULTS: SIGNIFICANT CHANGE UP
ESTIMATED AVERAGE GLUCOSE: 126 MG/DL — HIGH (ref 68–114)
HCT VFR BLD CALC: 33.5 % — LOW (ref 39–50)
HGB BLD-MCNC: 10.8 G/DL — LOW (ref 13–17)
LITHIUM SERPL-MCNC: <0.2 MMOL/L — LOW (ref 0.6–1.2)
MCHC RBC-ENTMCNC: 26.4 PG — LOW (ref 27–34)
MCHC RBC-ENTMCNC: 32.2 GM/DL — SIGNIFICANT CHANGE UP (ref 32–36)
MCV RBC AUTO: 81.9 FL — SIGNIFICANT CHANGE UP (ref 80–100)
NRBC # BLD: 0 /100 WBCS — SIGNIFICANT CHANGE UP (ref 0–0)
PHOSPHATE SERPL-MCNC: 2.7 MG/DL — SIGNIFICANT CHANGE UP (ref 2.5–4.5)
PLATELET # BLD AUTO: 251 K/UL — SIGNIFICANT CHANGE UP (ref 150–400)
RBC # BLD: 4.09 M/UL — LOW (ref 4.2–5.8)
RBC # FLD: 14.7 % — HIGH (ref 10.3–14.5)
SPECIMEN SOURCE: SIGNIFICANT CHANGE UP
WBC # BLD: 8.58 K/UL — SIGNIFICANT CHANGE UP (ref 3.8–10.5)
WBC # FLD AUTO: 8.58 K/UL — SIGNIFICANT CHANGE UP (ref 3.8–10.5)

## 2024-05-16 PROCEDURE — 93970 EXTREMITY STUDY: CPT | Mod: 26

## 2024-05-16 PROCEDURE — 93010 ELECTROCARDIOGRAM REPORT: CPT

## 2024-05-16 PROCEDURE — 99221 1ST HOSP IP/OBS SF/LOW 40: CPT

## 2024-05-16 RX ORDER — HEPARIN SODIUM 5000 [USP'U]/ML
5000 INJECTION INTRAVENOUS; SUBCUTANEOUS EVERY 8 HOURS
Refills: 0 | Status: DISCONTINUED | OUTPATIENT
Start: 2024-05-16 | End: 2024-05-22

## 2024-05-16 RX ADMIN — LEVETIRACETAM 500 MILLIGRAM(S): 250 TABLET, FILM COATED ORAL at 17:29

## 2024-05-16 RX ADMIN — Medication 5 MILLIGRAM(S): at 21:39

## 2024-05-16 RX ADMIN — QUETIAPINE FUMARATE 200 MILLIGRAM(S): 200 TABLET, FILM COATED ORAL at 12:08

## 2024-05-16 RX ADMIN — TAMSULOSIN HYDROCHLORIDE 0.4 MILLIGRAM(S): 0.4 CAPSULE ORAL at 21:39

## 2024-05-16 RX ADMIN — HEPARIN SODIUM 5000 UNIT(S): 5000 INJECTION INTRAVENOUS; SUBCUTANEOUS at 05:11

## 2024-05-16 RX ADMIN — LEVETIRACETAM 500 MILLIGRAM(S): 250 TABLET, FILM COATED ORAL at 05:11

## 2024-05-16 RX ADMIN — Medication 25 MICROGRAM(S): at 05:11

## 2024-05-16 RX ADMIN — HEPARIN SODIUM 5000 UNIT(S): 5000 INJECTION INTRAVENOUS; SUBCUTANEOUS at 21:39

## 2024-05-16 RX ADMIN — HEPARIN SODIUM 5000 UNIT(S): 5000 INJECTION INTRAVENOUS; SUBCUTANEOUS at 14:34

## 2024-05-16 NOTE — PATIENT PROFILE ADULT - FALL HARM RISK - HARM RISK INTERVENTIONS

## 2024-05-16 NOTE — PHYSICAL THERAPY INITIAL EVALUATION ADULT - ADDITIONAL COMMENTS
Patient reports that he lives with family in a private house. Patient reports that he is independent with ADLs/ambulation at baseline however questionable historian.

## 2024-05-16 NOTE — H&P ADULT - HISTORY OF PRESENT ILLNESS
67 y/o M PMH of BPD/BiPolar 1, CKD, Urinary Retention, Hypothyroidism presenting for medical evaluation     	Patient calm and appears to be at baseline mental status, but wife reports he is increasingly difficult to care for and often wanders outside of home. This evening, found wandering by his neighbor. No reported fevers/chills or recent fall. Wife requesting admission and possible placement.   patient was found to have AMS.  patient is being admitted for further work up and treatment

## 2024-05-16 NOTE — CARE COORDINATION ASSESSMENT. - NSCAREPROVIDERS_GEN_ALL_CORE_FT
CARE PROVIDERS:  Accepting Physician: Melquiades Dumont  Access Services: Koki Moran  Administration: Tanna Gregg  Administration: Kyalh Jessica  Administration: Jayashree Kitchen  Admitting: Melquiades Dumont  Attending: Melquiades Dumont  Consultant: Perlman, Daryl  Consultant: Leandro Floyd  Consultant: Sanjeev Webster  ED Attending: John Farias  ED Nurse: Truong Jara  Emergency Medicine: Karli Frazier  Nurse: Jocelyne Hartmann  Nurse: Gina Garcia  Nurse: Shane Montgomery  Nurse: Winter Carter  Nurse: Lloyd Barreto  Outpatient Provider: Sanjeev Webster  Outpatient Provider: Amico, Frank  Override: Elvis Davila  Override: Winter Carter  Override: Mary Jane Bruce  Override: Gina Garcia  Override: Nataliya Sullivan  PCA/Nursing Assistant: Nataliya Sullivan  PCA/Nursing Assistant: Amy Moya  Physical Therapy: Bj Campbell  Primary Team: Hroacio Harris  Respiratory Therapy: Angeli Lovelace  : Nai Llamas// Supp. Assoc.: Marilyn Ramsey

## 2024-05-16 NOTE — CARE COORDINATION ASSESSMENT. - NSDCPLANSERVICES_GEN_ALL_CORE
Patient was seen for assessment. He was alert and confused . Social work spoke with his wife. The patient lives with wife and adult son He has a history of bipolar and wife suspects maybe some dementia.  He was seen by physical therapy and has no pt needs. Wife reports he leaves the house and "wanders " . She reports she made a " Silver alert" to police.  Police found him and brought him here. They have tried electronic tracking unsuccessfully. He was recently at [facility] and discharged with a diagnosis of anemia . She reports she saw his psychiatrist yesterday and meds were changed. . She states she can not manage him. Discussed discharge planning . He has no PT needs. discussed Nursing home and Assisted Living Placement. She had been under the impression that insurance will pay for [facility] care. Educated her on long term care and provided resources for Elder Care  Educated her on role of  and discharge planner.

## 2024-05-16 NOTE — BH CONSULTATION LIAISON ASSESSMENT NOTE - CURRENT MEDICATION
MEDICATIONS  (STANDING):  heparin   Injectable 5000 Unit(s) SubCutaneous every 8 hours  levETIRAcetam 500 milliGRAM(s) Oral two times a day  levothyroxine 25 MICROGram(s) Oral daily  melatonin 5 milliGRAM(s) Oral at bedtime  QUEtiapine 200 milliGRAM(s) Oral daily  tamsulosin 0.4 milliGRAM(s) Oral at bedtime    MEDICATIONS  (PRN):  acetaminophen     Tablet .. 650 milliGRAM(s) Oral every 6 hours PRN Temp greater or equal to 38C (100.4F), Mild Pain (1 - 3)

## 2024-05-16 NOTE — INPATIENT CERTIFICATION FOR MEDICARE PATIENTS - THE STATUS OF COMORBIDITIES.
2. The status of comorbities. (See ED/admit documents)
Patient with lower abdominal pain, will check labs, U/S pelvis. If negative, will get CT abdomen/pelvis. Ddx: ovarian cyst, ovarian torsion, appendicitis, colitis, diverticulitis, bowel obstruction.

## 2024-05-16 NOTE — H&P ADULT - NSHPPHYSICALEXAM_GEN_ALL_CORE
· Physical Examination: GENERAL: no acute distress; well-developed  	HEAD:  Atraumatic, Normocephalic  	EYES: EOMI, PERRLA,   	ENT: MMM; oropharynx clear  	NECK: Supple, No JVD  	CHEST/LUNG: Clear to auscultation bilaterally; No wheeze  	HEART: Regular rate and rhythm; No murmurs, rubs, or gallops  	ABDOMEN: Soft, Nontender, Nondistended; Bowel sounds present  	EXTREMITIES:  2+ Peripheral Pulses, 2+ pitting LE edema L >R   	PSYCH: AAOx2 to self and home '[place]'   	NEUROLOGY: no focal motor or sensory deficits. 5/5 muscle strength in all extremities.   SKIN: No rashes or lesions

## 2024-05-16 NOTE — PHYSICAL THERAPY INITIAL EVALUATION ADULT - PERTINENT HX OF CURRENT PROBLEM, REHAB EVAL
69 y/o M PMH of BPD/BiPolar 1, CKD, Urinary Retention, Hypothyroidism presenting for medical evaluation.

## 2024-05-16 NOTE — BH CONSULTATION LIAISON ASSESSMENT NOTE - NSBHCHARTREVIEWLAB_PSY_A_CORE FT
10.8   8.58  )-----------( 251      ( 16 May 2024 09:48 )             33.5   05-15    141  |  112<H>  |  30<H>  ----------------------------<  144<H>  3.8   |  23  |  1.30    Ca    9.0      15 May 2024 21:55  Phos  2.7     05-16  Mg     2.1     05-15    TPro  7.0  /  Alb  3.5  /  TBili  0.2  /  DBili  x   /  AST  21  /  ALT  22  /  AlkPhos  130<H>  05-15

## 2024-05-16 NOTE — H&P ADULT - PROBLEM SELECTOR PROBLEM 1
AMS (altered mental status) PAST MEDICAL HISTORY:  Ectopic pregnancy 8/2/23    No pertinent past medical history

## 2024-05-16 NOTE — H&P ADULT - NSHPLABSRESULTS_GEN_ALL_CORE
05-15    141  |  112<H>  |  30<H>  ----------------------------<  144<H>  3.8   |  23  |  1.30    Ca    9.0      15 May 2024 21:55  Phos  2.7       Mg     2.1     05-15    TPro  7.0  /  Alb  3.5  /  TBili  0.2  /  DBili  x   /  AST  21  /  ALT  22  /  AlkPhos  130<H>  05-15                            10.8   8.58  )-----------( 251      ( 16 May 2024 09:48 )             33.5             LIVER FUNCTIONS - ( 15 May 2024 21:55 )  Alb: 3.5 g/dL / Pro: 7.0 g/dL / ALK PHOS: 130 U/L / ALT: 22 U/L / AST: 21 U/L / GGT: x                 Urinalysis Basic - ( 15 May 2024 22:00 )    Color: Yellow / Appearance: Clear / S.019 / pH: x  Gluc: x / Ketone: Negative mg/dL  / Bili: Negative / Urobili: 0.2 mg/dL   Blood: x / Protein: Negative mg/dL / Nitrite: Negative   Leuk Esterase: Negative / RBC: 0 /HPF / WBC 2 /HPF   Sq Epi: x / Non Sq Epi: x / Bacteria: Occasional /HPF

## 2024-05-16 NOTE — BH CONSULTATION LIAISON ASSESSMENT NOTE - HPI (INCLUDE ILLNESS QUALITY, SEVERITY, DURATION, TIMING, CONTEXT, MODIFYING FACTORS, ASSOCIATED SIGNS AND SYMPTOMS)
Patient seen, evaluated and chart reviewed. Patient is a 68 y/o male PMH BPD (diagnosed 2005), CKD3, hypothyroidism Urinary retention and insomnia presents to  the hospital with increasingly erratic behavior, unable to follow suggestions, and presenting with elevated mood, along with grandiose delusions "I am planning to run for the ." Patient's wife states she is unable to care for him, and appears to be interested in placement.

## 2024-05-16 NOTE — BH CONSULTATION LIAISON ASSESSMENT NOTE - NSBHCHARTREVIEWVS_PSY_A_CORE FT
Vital Signs Last 24 Hrs  T(C): 36.4 (16 May 2024 12:18), Max: 36.8 (15 May 2024 19:53)  T(F): 97.6 (16 May 2024 12:18), Max: 98.3 (15 May 2024 19:53)  HR: 67 (16 May 2024 12:18) (65 - 86)  BP: 158/86 (16 May 2024 12:18) (136/67 - 170/86)  BP(mean): --  RR: 15 (16 May 2024 12:18) (15 - 16)  SpO2: 96% (16 May 2024 12:18) (95% - 98%)    Parameters below as of 16 May 2024 12:18  Patient On (Oxygen Delivery Method): room air

## 2024-05-16 NOTE — ED ADULT NURSE REASSESSMENT NOTE - NS ED NURSE REASSESS COMMENT FT1
Report rec'd for pt being admitted, pt currently sleeping, requested ED tech to complete EKG and will draw lithium per order.

## 2024-05-17 LAB
ALBUMIN SERPL ELPH-MCNC: 3.3 G/DL — SIGNIFICANT CHANGE UP (ref 3.3–5)
ALP SERPL-CCNC: 101 U/L — SIGNIFICANT CHANGE UP (ref 40–120)
ALT FLD-CCNC: 22 U/L — SIGNIFICANT CHANGE UP (ref 12–78)
ANION GAP SERPL CALC-SCNC: 4 MMOL/L — LOW (ref 5–17)
AST SERPL-CCNC: 16 U/L — SIGNIFICANT CHANGE UP (ref 15–37)
BILIRUB SERPL-MCNC: 0.3 MG/DL — SIGNIFICANT CHANGE UP (ref 0.2–1.2)
BUN SERPL-MCNC: 25 MG/DL — HIGH (ref 7–23)
CALCIUM SERPL-MCNC: 9.2 MG/DL — SIGNIFICANT CHANGE UP (ref 8.5–10.1)
CHLORIDE SERPL-SCNC: 109 MMOL/L — HIGH (ref 96–108)
CO2 SERPL-SCNC: 26 MMOL/L — SIGNIFICANT CHANGE UP (ref 22–31)
CREAT SERPL-MCNC: 1.2 MG/DL — SIGNIFICANT CHANGE UP (ref 0.5–1.3)
EGFR: 65 ML/MIN/1.73M2 — SIGNIFICANT CHANGE UP
FOLATE SERPL-MCNC: 13.9 NG/ML — SIGNIFICANT CHANGE UP
GLUCOSE SERPL-MCNC: 101 MG/DL — HIGH (ref 70–99)
POTASSIUM SERPL-MCNC: 4.2 MMOL/L — SIGNIFICANT CHANGE UP (ref 3.5–5.3)
POTASSIUM SERPL-SCNC: 4.2 MMOL/L — SIGNIFICANT CHANGE UP (ref 3.5–5.3)
PROT SERPL-MCNC: 6.8 G/DL — SIGNIFICANT CHANGE UP (ref 6–8.3)
SODIUM SERPL-SCNC: 139 MMOL/L — SIGNIFICANT CHANGE UP (ref 135–145)
TSH SERPL-MCNC: 2.33 UIU/ML — SIGNIFICANT CHANGE UP (ref 0.36–3.74)
VIT B12 SERPL-MCNC: 529 PG/ML — SIGNIFICANT CHANGE UP (ref 232–1245)

## 2024-05-17 RX ADMIN — Medication 25 MICROGRAM(S): at 05:53

## 2024-05-17 RX ADMIN — HEPARIN SODIUM 5000 UNIT(S): 5000 INJECTION INTRAVENOUS; SUBCUTANEOUS at 21:13

## 2024-05-17 RX ADMIN — LEVETIRACETAM 500 MILLIGRAM(S): 250 TABLET, FILM COATED ORAL at 05:54

## 2024-05-17 RX ADMIN — Medication 5 MILLIGRAM(S): at 21:13

## 2024-05-17 RX ADMIN — QUETIAPINE FUMARATE 200 MILLIGRAM(S): 200 TABLET, FILM COATED ORAL at 11:20

## 2024-05-17 RX ADMIN — HEPARIN SODIUM 5000 UNIT(S): 5000 INJECTION INTRAVENOUS; SUBCUTANEOUS at 14:25

## 2024-05-17 RX ADMIN — LEVETIRACETAM 500 MILLIGRAM(S): 250 TABLET, FILM COATED ORAL at 17:19

## 2024-05-17 RX ADMIN — TAMSULOSIN HYDROCHLORIDE 0.4 MILLIGRAM(S): 0.4 CAPSULE ORAL at 21:13

## 2024-05-17 RX ADMIN — HEPARIN SODIUM 5000 UNIT(S): 5000 INJECTION INTRAVENOUS; SUBCUTANEOUS at 05:54

## 2024-05-17 NOTE — DIETITIAN INITIAL EVALUATION ADULT - PERTINENT LABORATORY DATA
05-15    141  |  112<H>  |  30<H>  ----------------------------<  144<H>  3.8   |  23  |  1.30    Ca    9.0      15 May 2024 21:55  Phos  2.7     05-16  Mg     2.1     05-15    TPro  7.0  /  Alb  3.5  /  TBili  0.2  /  DBili  x   /  AST  21  /  ALT  22  /  AlkPhos  130<H>  05-15  A1C with Estimated Average Glucose Result: 6.0 % (05-16-24 @ 09:48)

## 2024-05-17 NOTE — OCCUPATIONAL THERAPY INITIAL EVALUATION ADULT - GENERAL OBSERVATIONS, REHAB EVAL
Pt received supine in bed, NAD, CNA present. Pt agrees to participate with OT for eval and senia well. Pt is alert and responsive to name. Pt follows 1- step commands with min difficulty and is able to sequence basic self care tasks but requires supervision for safety due to decreased safety awareness and poor short term memory.

## 2024-05-17 NOTE — OCCUPATIONAL THERAPY INITIAL EVALUATION ADULT - DIAGNOSIS, OT EVAL
Decreased safety awareness/cognitive deficits and decreased balance affecting safety and functional independence with ADLs and functional mobility.

## 2024-05-17 NOTE — PROGRESS NOTE ADULT - SUBJECTIVE AND OBJECTIVE BOX
Date of Service 24 @ 14:50    Patient is a 68y old  Male who presents with a chief complaint of Altered mental status     (17 May 2024 10:26)      INTERVAL /OVERNIGHT EVENTS: alert awake confused    MEDICATIONS  (STANDING):  heparin   Injectable 5000 Unit(s) SubCutaneous every 8 hours  levETIRAcetam 500 milliGRAM(s) Oral two times a day  levothyroxine 25 MICROGram(s) Oral daily  melatonin 5 milliGRAM(s) Oral at bedtime  QUEtiapine 200 milliGRAM(s) Oral daily  tamsulosin 0.4 milliGRAM(s) Oral at bedtime    MEDICATIONS  (PRN):  acetaminophen     Tablet .. 650 milliGRAM(s) Oral every 6 hours PRN Temp greater or equal to 38C (100.4F), Mild Pain (1 - 3)      Allergies    No Known Allergies    Intolerances        REVIEW OF SYSTEMS:  unable to obtain    Vital Signs Last 24 Hrs  T(C): 36.7 (17 May 2024 12:31), Max: 36.8 (16 May 2024 19:49)  T(F): 98.1 (17 May 2024 12:31), Max: 98.2 (16 May 2024 19:49)  HR: 89 (17 May 2024 12:31) (67 - 89)  BP: 110/69 (17 May 2024 12:31) (110/69 - 146/70)  BP(mean): --  RR: 16 (17 May 2024 12:31) (16 - 17)  SpO2: 92% (17 May 2024 12:31) (92% - 98%)    Parameters below as of 17 May 2024 12:31  Patient On (Oxygen Delivery Method): room air        PHYSICAL EXAM:  GENERAL: NAD, well-groomed, well-developed  HEAD:  Atraumatic, Normocephalic  EYES: EOMI, PERRLA, conjunctiva and sclera clear  ENMT: No tonsillar erythema, exudates, or enlargement; Moist mucous membranes, Good dentition, No lesions  NECK: Supple, No JVD, Normal thyroid  NERVOUS SYSTEM:  Alert & awake; Motor Strength 5/5 B/L upper and lower extremities; DTRs 2+ intact and symmetric  CHEST/LUNG: Clear to auscultation bilaterally; No rales, rhonchi, wheezing, or rubs  HEART: Regular rate and rhythm; No murmurs, rubs, or gallops  ABDOMEN: Soft, Nontender, Nondistended; Bowel sounds present  EXTREMITIES:  2+ Peripheral Pulses, No clubbing, cyanosis, or edema  LYMPH: No lymphadenopathy noted  SKIN: No rashes or lesions    LABS:      Ca    9.0        15 May 2024 21:55        Urinalysis Basic - ( 15 May 2024 22:00 )    Color: Yellow / Appearance: Clear / S.019 / pH: x  Gluc: x / Ketone: Negative mg/dL  / Bili: Negative / Urobili: 0.2 mg/dL   Blood: x / Protein: Negative mg/dL / Nitrite: Negative   Leuk Esterase: Negative / RBC: 0 /HPF / WBC 2 /HPF   Sq Epi: x / Non Sq Epi: x / Bacteria: Occasional /HPF      CAPILLARY BLOOD GLUCOSE          RADIOLOGY & ADDITIONAL TESTS:    Notes Reviewed:  [x ] YES  [ ] NO    Care Discussed with Consultants/Other Providers [x ] YES  [ ] NO

## 2024-05-17 NOTE — OCCUPATIONAL THERAPY INITIAL EVALUATION ADULT - ADDITIONAL COMMENTS
Pt is a poor historian. As per care coordination assessment, pt lives with his wife and son. Unclear PLOF. Pt states he was independent at baseline.

## 2024-05-17 NOTE — OCCUPATIONAL THERAPY INITIAL EVALUATION ADULT - COGNITIVE, VISUAL PERCEPTUAL, OT EVAL
Pt will follow 1 step commands 100% of the time to improve safety and functional participation in ADLs within 2-3 sessions.

## 2024-05-17 NOTE — DIETITIAN INITIAL EVALUATION ADULT - OTHER INFO
67 y/o M PMH of BPD/BiPolar 1, CKD, Urinary Retention, Hypothyroidism presenting for medical evaluation     	Patient calm and appears to be at baseline mental status, but wife reports he is increasingly difficult to care for and often wanders outside of home. This evening, found wandering by his neighbor. No reported fevers/chills or recent fall. Wife requesting admission and possible placement.   patient was found to have AMS.  weight 214# this admit patient states # of note weight April 2023 admit 179#

## 2024-05-17 NOTE — OCCUPATIONAL THERAPY INITIAL EVALUATION ADULT - PERTINENT HX OF CURRENT PROBLEM, REHAB EVAL
67 y/o M PMH of BPD/BiPolar 1, CKD, Urinary Retention, Hypothyroidism presenting for medical evaluation. Patient calm and appears to be at baseline mental status, but wife reports he is increasingly difficult to care for and often wanders outside of home. This evening, found wandering by his neighbor. No reported fevers/chills or recent fall. Wife requesting admission and possible placement. Patient was found to have AMS. Patient is being admitted for further work up and treatment.

## 2024-05-17 NOTE — PROGRESS NOTE ADULT - SUBJECTIVE AND OBJECTIVE BOX
Neurology follow up note    MAXI [id number]      Interval History:    Patient feels ok no new complaints.    Allergies    No Known Allergies    Intolerances        MEDICATIONS    acetaminophen     Tablet .. 650 milliGRAM(s) Oral every 6 hours PRN  heparin   Injectable 5000 Unit(s) SubCutaneous every 8 hours  levETIRAcetam 500 milliGRAM(s) Oral two times a day  levothyroxine 25 MICROGram(s) Oral daily  melatonin 5 milliGRAM(s) Oral at bedtime  QUEtiapine 200 milliGRAM(s) Oral daily  tamsulosin 0.4 milliGRAM(s) Oral at bedtime              Vital Signs Last 24 Hrs  T(C): 36.7 (17 May 2024 05:16), Max: 36.8 (16 May 2024 19:49)  T(F): 98.1 (17 May 2024 05:16), Max: 98.2 (16 May 2024 19:49)  HR: 67 (17 May 2024 05:16) (67 - 70)  BP: 128/67 (17 May 2024 05:16) (128/67 - 158/86)  BP(mean): --  RR: 16 (17 May 2024 05:16) (15 - 17)  SpO2: 95% (17 May 2024 05:16) (95% - 98%)    Parameters below as of 17 May 2024 05:16  Patient On (Oxygen Delivery Method): room air        REVIEW OF SYSTEMS:  Limited secondary to the patient at times would not answer questions accordingly, but constitutionally, he denies fever, chills, or night sweats.  HEAD:  No headaches.  EYES:  No double vision.  EARS:  Hard of hearing.  NECK:  No neck pain.  CARDIOVASCULAR:  No chest pain.  RESPIRATORY:  No shortness of breath.  ABDOMEN:  No nausea, vomiting, or abdominal pain.  EXTREMITIES/NEUROLOGICAL:  No numbness or tingling.  MUSCULOSKELETAL:  No joint pain.    Again, these questions are limited.    PHYSICAL EXAMINATION:  HEAD:  Normocephalic, atraumatic.  EYES:  No scleral icterus.  EARS:  Hard of hearing.  NECK:  Supple.  CARDIOVASCULAR:  S1, S2 heard.  RESPIRATORY:  Air entry bilaterally.  ABDOMEN:  Soft and nontender.  EXTREMITIES:  No clubbing or cyanosis was noted.  VITAL SIGNS:  Temperature 97.9, pulse 69, blood pressure 152/71, and respirations 18.    NEUROLOGIC EXAMINATION:  The patient was awake, alert, in bed.  Location with questions was hospital, year was , month was May.  Was able to name simple objects, but upon conversing with the patient at times, he would give irrelevant answers and attempt to humor.  Extraocular movements were intact.  Was able to make out number fingers in each eye.  Motor examination bilateral upper was 4/5 and bilateral lower 3+/5.  Sensory, bilateral upper and lower was intact to light touch.        LABS:  CBC Full  -  ( 16 May 2024 09:48 )  WBC Count : 8.58 K/uL  RBC Count : 4.09 M/uL  Hemoglobin : 10.8 g/dL  Hematocrit : 33.5 %  Platelet Count - Automated : 251 K/uL  Mean Cell Volume : 81.9 fl  Mean Cell Hemoglobin : 26.4 pg  Mean Cell Hemoglobin Concentration : 32.2 gm/dL  Auto Neutrophil # : x  Auto Lymphocyte # : x  Auto Monocyte # : x  Auto Eosinophil # : x  Auto Basophil # : x  Auto Neutrophil % : x  Auto Lymphocyte % : x  Auto Monocyte % : x  Auto Eosinophil % : x  Auto Basophil % : x    Urinalysis Basic - ( 15 May 2024 22:00 )    Color: Yellow / Appearance: Clear / S.019 / pH: x  Gluc: x / Ketone: Negative mg/dL  / Bili: Negative / Urobili: 0.2 mg/dL   Blood: x / Protein: Negative mg/dL / Nitrite: Negative   Leuk Esterase: Negative / RBC: 0 /HPF / WBC 2 /HPF   Sq Epi: x / Non Sq Epi: x / Bacteria: Occasional /HPF      -15    141  |  112<H>  |  30<H>  ----------------------------<  144<H>  3.8   |  23  |  1.30    Ca    9.0      15 May 2024 21:55  Phos  2.7     05-  Mg     2.1     -15    TPro  7.0  /  Alb  3.5  /  TBili  0.2  /  DBili  x   /  AST  21  /  ALT  22  /  AlkPhos  130<H>  05-15    Hemoglobin A1C:     LIVER FUNCTIONS - ( 15 May 2024 21:55 )  Alb: 3.5 g/dL / Pro: 7.0 g/dL / ALK PHOS: 130 U/L / ALT: 22 U/L / AST: 21 U/L / GGT: x           Vitamin B12         RADIOLOGY  ANALYSIS AND PLAN:  This is a 68-year-old with episode of altered mental status.    For episode of altered mental status, suspect most likely secondary to underlying mild cognitive impairment, question slight dementia with patient wandering, appears more psychiatric in nature.  Suspect less likely this is a primary CNS event.   For history of hypothyroidism, we will place the patient on Synthroid.  We will check TSH.  For history of bipolar disorder, continue the patient on home psychiatric medication.  I would recommend consider psychiatric follow-up in regard to possibly adjustment of medications to keep the patient calm.  Attempt to contact spouseAlejandra at [phone] no response. yesterday and today 741am    48 minutes time was spent in patient plan of care, reviewing data, speaking to multidisciplinary care team with greater than 50% of time counseling care, coordination.    Thank you for the courtesy of consultation. Neurology follow up note    MAXI [id number]      Interval History:    Patient feels ok no new complaints.    Allergies    No Known Allergies    Intolerances        MEDICATIONS    acetaminophen     Tablet .. 650 milliGRAM(s) Oral every 6 hours PRN  heparin   Injectable 5000 Unit(s) SubCutaneous every 8 hours  levETIRAcetam 500 milliGRAM(s) Oral two times a day  levothyroxine 25 MICROGram(s) Oral daily  melatonin 5 milliGRAM(s) Oral at bedtime  QUEtiapine 200 milliGRAM(s) Oral daily  tamsulosin 0.4 milliGRAM(s) Oral at bedtime              Vital Signs Last 24 Hrs  T(C): 36.7 (17 May 2024 05:16), Max: 36.8 (16 May 2024 19:49)  T(F): 98.1 (17 May 2024 05:16), Max: 98.2 (16 May 2024 19:49)  HR: 67 (17 May 2024 05:16) (67 - 70)  BP: 128/67 (17 May 2024 05:16) (128/67 - 158/86)  BP(mean): --  RR: 16 (17 May 2024 05:16) (15 - 17)  SpO2: 95% (17 May 2024 05:16) (95% - 98%)    Parameters below as of 17 May 2024 05:16  Patient On (Oxygen Delivery Method): room air        REVIEW OF SYSTEMS:  Limited secondary to the patient at times would not answer questions accordingly, but constitutionally, he denies fever, chills, or night sweats.  HEAD:  No headaches.  EYES:  No double vision.  EARS:  Hard of hearing.  NECK:  No neck pain.  CARDIOVASCULAR:  No chest pain.  RESPIRATORY:  No shortness of breath.  ABDOMEN:  No nausea, vomiting, or abdominal pain.  EXTREMITIES/NEUROLOGICAL:  No numbness or tingling.  MUSCULOSKELETAL:  No joint pain.    Again, these questions are limited.    PHYSICAL EXAMINATION:  HEAD:  Normocephalic, atraumatic.  EYES:  No scleral icterus.  EARS:  Hard of hearing.  NECK:  Supple.  CARDIOVASCULAR:  S1, S2 heard.  RESPIRATORY:  Air entry bilaterally.  ABDOMEN:  Soft and nontender.  EXTREMITIES:  No clubbing or cyanosis was noted.    NEUROLOGIC EXAMINATION:  The patient was awake, alert, in bed.  Location with questions was hospital, year was , month was May.  Was able to name simple objects, but upon conversing with the patient at times, he would give irrelevant answers and attempt to humor.  Extraocular movements were intact.  Was able to make out number fingers in each eye.  Motor examination bilateral upper was 4/5 and bilateral lower 3+/5.  Sensory, bilateral upper and lower was intact to light touch.    LABS:  CBC Full  -  ( 16 May 2024 09:48 )  WBC Count : 8.58 K/uL  RBC Count : 4.09 M/uL  Hemoglobin : 10.8 g/dL  Hematocrit : 33.5 %  Platelet Count - Automated : 251 K/uL  Mean Cell Volume : 81.9 fl  Mean Cell Hemoglobin : 26.4 pg  Mean Cell Hemoglobin Concentration : 32.2 gm/dL  Auto Neutrophil # : x  Auto Lymphocyte # : x  Auto Monocyte # : x  Auto Eosinophil # : x  Auto Basophil # : x  Auto Neutrophil % : x  Auto Lymphocyte % : x  Auto Monocyte % : x  Auto Eosinophil % : x  Auto Basophil % : x    Urinalysis Basic - ( 15 May 2024 22:00 )    Color: Yellow / Appearance: Clear / S.019 / pH: x  Gluc: x / Ketone: Negative mg/dL  / Bili: Negative / Urobili: 0.2 mg/dL   Blood: x / Protein: Negative mg/dL / Nitrite: Negative   Leuk Esterase: Negative / RBC: 0 /HPF / WBC 2 /HPF   Sq Epi: x / Non Sq Epi: x / Bacteria: Occasional /HPF      05-15    141  |  112<H>  |  30<H>  ----------------------------<  144<H>  3.8   |  23  |  1.30    Ca    9.0      15 May 2024 21:55  Phos  2.7     -  Mg     2.1     -15    TPro  7.0  /  Alb  3.5  /  TBili  0.2  /  DBili  x   /  AST  21  /  ALT  22  /  AlkPhos  130<H>  05-15    Hemoglobin A1C:     LIVER FUNCTIONS - ( 15 May 2024 21:55 )  Alb: 3.5 g/dL / Pro: 7.0 g/dL / ALK PHOS: 130 U/L / ALT: 22 U/L / AST: 21 U/L / GGT: x           Vitamin B12         RADIOLOGY  ANALYSIS AND PLAN:  This is a 68-year-old with episode of altered mental status.    For episode of altered mental status, suspect most likely secondary to underlying mild cognitive impairment, question slight dementia with patient wandering, appears more psychiatric in nature.  Suspect less likely this is a primary CNS event.   For history of hypothyroidism, we will place the patient on Synthroid.  We will check TSH.  For history of bipolar disorder, continue the patient on home psychiatric medication.  I would recommend consider psychiatric follow-up in regard to possibly adjustment of medications to keep the patient calm.  Attempt to contact spouseAlejandra at [phone] no response. yesterday and today 741am    48 minutes time was spent in patient plan of care, reviewing data, speaking to multidisciplinary care team with greater than 50% of time counseling care, coordination.    Thank you for the courtesy of consultation.

## 2024-05-17 NOTE — SOCIAL WORK PROGRESS NOTE - NSSWPROGRESSNOTE_GEN_ALL_CORE
Patient is being recommended for behavioral health. Social Work initiated  [facility] behavioral health transfer process so it would come from them. Dc ready  but are awaiting latest psych note  and COVID PCR and medical clearance necessary to resubmit . Social work to follow

## 2024-05-18 ENCOUNTER — TRANSCRIPTION ENCOUNTER (OUTPATIENT)
Age: 69
End: 2024-05-18

## 2024-05-18 PROCEDURE — 99231 SBSQ HOSP IP/OBS SF/LOW 25: CPT

## 2024-05-18 RX ORDER — LITHIUM CARBONATE 300 MG/1
300 TABLET, EXTENDED RELEASE ORAL THREE TIMES A DAY
Refills: 0 | Status: DISCONTINUED | OUTPATIENT
Start: 2024-05-18 | End: 2024-05-22

## 2024-05-18 RX ORDER — FERROUS SULFATE 325(65) MG
325 TABLET ORAL DAILY
Refills: 0 | Status: DISCONTINUED | OUTPATIENT
Start: 2024-05-18 | End: 2024-05-22

## 2024-05-18 RX ADMIN — Medication 325 MILLIGRAM(S): at 13:45

## 2024-05-18 RX ADMIN — LEVETIRACETAM 500 MILLIGRAM(S): 250 TABLET, FILM COATED ORAL at 17:38

## 2024-05-18 RX ADMIN — QUETIAPINE FUMARATE 200 MILLIGRAM(S): 200 TABLET, FILM COATED ORAL at 11:14

## 2024-05-18 RX ADMIN — HEPARIN SODIUM 5000 UNIT(S): 5000 INJECTION INTRAVENOUS; SUBCUTANEOUS at 06:18

## 2024-05-18 RX ADMIN — TAMSULOSIN HYDROCHLORIDE 0.4 MILLIGRAM(S): 0.4 CAPSULE ORAL at 21:02

## 2024-05-18 RX ADMIN — Medication 5 MILLIGRAM(S): at 21:03

## 2024-05-18 RX ADMIN — LEVETIRACETAM 500 MILLIGRAM(S): 250 TABLET, FILM COATED ORAL at 06:18

## 2024-05-18 RX ADMIN — Medication 25 MICROGRAM(S): at 06:18

## 2024-05-18 RX ADMIN — HEPARIN SODIUM 5000 UNIT(S): 5000 INJECTION INTRAVENOUS; SUBCUTANEOUS at 21:02

## 2024-05-18 RX ADMIN — LITHIUM CARBONATE 300 MILLIGRAM(S): 300 TABLET, EXTENDED RELEASE ORAL at 21:03

## 2024-05-18 RX ADMIN — LITHIUM CARBONATE 300 MILLIGRAM(S): 300 TABLET, EXTENDED RELEASE ORAL at 13:45

## 2024-05-18 NOTE — PROGRESS NOTE ADULT - SUBJECTIVE AND OBJECTIVE BOX
Date of Service 05-18-24 @ 17:08    Patient is a 68y old  Male who presents with a chief complaint of AMS (18 May 2024 06:33)      INTERVAL /OVERNIGHT EVENTS: confused     MEDICATIONS  (STANDING):  ferrous    sulfate 325 milliGRAM(s) Oral daily  heparin   Injectable 5000 Unit(s) SubCutaneous every 8 hours  levETIRAcetam 500 milliGRAM(s) Oral two times a day  levothyroxine 25 MICROGram(s) Oral daily  lithium 300 milliGRAM(s) Oral three times a day  melatonin 5 milliGRAM(s) Oral at bedtime  multivitamin 1 Tablet(s) Oral daily  QUEtiapine 200 milliGRAM(s) Oral daily  tamsulosin 0.4 milliGRAM(s) Oral at bedtime    MEDICATIONS  (PRN):  acetaminophen     Tablet .. 650 milliGRAM(s) Oral every 6 hours PRN Temp greater or equal to 38C (100.4F), Mild Pain (1 - 3)      Allergies    No Known Allergies    Intolerances        REVIEW OF SYSTEMS:  unable to obtain    Vital Signs Last 24 Hrs  T(C): 36.3 (18 May 2024 11:58), Max: 36.8 (17 May 2024 20:54)  T(F): 97.4 (18 May 2024 11:58), Max: 98.2 (17 May 2024 20:54)  HR: 69 (18 May 2024 11:58) (64 - 75)  BP: 140/77 (18 May 2024 11:58) (134/80 - 162/80)  BP(mean): --  RR: 18 (18 May 2024 11:58) (17 - 18)  SpO2: 98% (18 May 2024 11:58) (95% - 98%)    Parameters below as of 18 May 2024 11:58  Patient On (Oxygen Delivery Method): room air        PHYSICAL EXAM:  GENERAL: NAD, well-groomed, well-developed  HEAD:  Atraumatic, Normocephalic  EYES: EOMI, PERRLA, conjunctiva and sclera clear  ENMT: No tonsillar erythema, exudates, or enlargement; Moist mucous membranes, Good dentition, No lesions  NECK: Supple, No JVD, Normal thyroid  NERVOUS SYSTEM:  Alert & awake, Good concentration; Motor Strength 5/5 B/L upper and lower extremities; DTRs 2+ intact and symmetric  CHEST/LUNG: Clear to auscultation bilaterally; No rales, rhonchi, wheezing, or rubs  HEART: Regular rate and rhythm; No murmurs, rubs, or gallops  ABDOMEN: Soft, Nontender, Nondistended; Bowel sounds present  EXTREMITIES:  2+ Peripheral Pulses, No clubbing, cyanosis, or edema  LYMPH: No lymphadenopathy noted  SKIN: No rashes or lesions    LABS:      Ca    9.2        17 May 2024 16:35        Urinalysis Basic - ( 17 May 2024 16:35 )    Color: x / Appearance: x / SG: x / pH: x  Gluc: 101 mg/dL / Ketone: x  / Bili: x / Urobili: x   Blood: x / Protein: x / Nitrite: x   Leuk Esterase: x / RBC: x / WBC x   Sq Epi: x / Non Sq Epi: x / Bacteria: x      CAPILLARY BLOOD GLUCOSE          RADIOLOGY & ADDITIONAL TESTS:    Notes Reviewed:  [ x] YES  [ ] NO    Care Discussed with Consultants/Other Providers [x ] YES  [ ] NO

## 2024-05-18 NOTE — PROGRESS NOTE ADULT - SUBJECTIVE AND OBJECTIVE BOX
Neurology follow up note    MAXI [id number]      Interval History:    Patient feels ok no new complaints.    Allergies    No Known Allergies    Intolerances        MEDICATIONS    acetaminophen     Tablet .. 650 milliGRAM(s) Oral every 6 hours PRN  heparin   Injectable 5000 Unit(s) SubCutaneous every 8 hours  levETIRAcetam 500 milliGRAM(s) Oral two times a day  levothyroxine 25 MICROGram(s) Oral daily  melatonin 5 milliGRAM(s) Oral at bedtime  QUEtiapine 200 milliGRAM(s) Oral daily  tamsulosin 0.4 milliGRAM(s) Oral at bedtime              Vital Signs Last 24 Hrs  T(C): 36.4 (18 May 2024 06:15), Max: 36.8 (17 May 2024 20:54)  T(F): 97.6 (18 May 2024 06:15), Max: 98.2 (17 May 2024 20:54)  HR: 64 (18 May 2024 06:15) (64 - 89)  BP: 162/80 (18 May 2024 06:15) (110/69 - 162/80)  BP(mean): --  RR: 18 (18 May 2024 06:15) (16 - 18)  SpO2: 97% (18 May 2024 06:15) (92% - 97%)    Parameters below as of 17 May 2024 21:19  Patient On (Oxygen Delivery Method): room air      REVIEW OF SYSTEMS:  Limited secondary to the patient at times would not answer questions accordingly, but constitutionally, he denies fever, chills, or night sweats.  HEAD:  No headaches.  EYES:  No double vision.  EARS:  Hard of hearing.  NECK:  No neck pain.  CARDIOVASCULAR:  No chest pain.  RESPIRATORY:  No shortness of breath.  ABDOMEN:  No nausea, vomiting, or abdominal pain.  EXTREMITIES/NEUROLOGICAL:  No numbness or tingling.  MUSCULOSKELETAL:  No joint pain.    Again, these questions are limited.    PHYSICAL EXAMINATION:  HEAD:  Normocephalic, atraumatic.  EYES:  No scleral icterus.  EARS:  Hard of hearing.  NECK:  Supple.  CARDIOVASCULAR:  S1, S2 heard.  RESPIRATORY:  Air entry bilaterally.  ABDOMEN:  Soft and nontender.  EXTREMITIES:  No clubbing or cyanosis was noted.    NEUROLOGIC EXAMINATION:  The patient was awake, alert, in bed.  Location with questions was hospital, year was 1946, month was May.  Was able to name simple objects, but upon conversing with the patient at times, he would give irrelevant answers and attempt to humor.  Extraocular movements were intact.  Was able to make out number fingers in each eye.  Motor examination bilateral upper was 4/5 and bilateral lower 3+/5.  Sensory, bilateral upper and lower was intact to light touch       LABS:  CBC Full  -  ( 16 May 2024 09:48 )  WBC Count : 8.58 K/uL  RBC Count : 4.09 M/uL  Hemoglobin : 10.8 g/dL  Hematocrit : 33.5 %  Platelet Count - Automated : 251 K/uL  Mean Cell Volume : 81.9 fl  Mean Cell Hemoglobin : 26.4 pg  Mean Cell Hemoglobin Concentration : 32.2 gm/dL  Auto Neutrophil # : x  Auto Lymphocyte # : x  Auto Monocyte # : x  Auto Eosinophil # : x  Auto Basophil # : x  Auto Neutrophil % : x  Auto Lymphocyte % : x  Auto Monocyte % : x  Auto Eosinophil % : x  Auto Basophil % : x    Urinalysis Basic - ( 17 May 2024 16:35 )    Color: x / Appearance: x / SG: x / pH: x  Gluc: 101 mg/dL / Ketone: x  / Bili: x / Urobili: x   Blood: x / Protein: x / Nitrite: x   Leuk Esterase: x / RBC: x / WBC x   Sq Epi: x / Non Sq Epi: x / Bacteria: x      05-17    139  |  109<H>  |  25<H>  ----------------------------<  101<H>  4.2   |  26  |  1.20    Ca    9.2      17 May 2024 16:35  Phos  2.7     05-16    TPro  6.8  /  Alb  3.3  /  TBili  0.3  /  DBili  x   /  AST  16  /  ALT  22  /  AlkPhos  101  05-17    Hemoglobin A1C:     LIVER FUNCTIONS - ( 17 May 2024 16:35 )  Alb: 3.3 g/dL / Pro: 6.8 g/dL / ALK PHOS: 101 U/L / ALT: 22 U/L / AST: 16 U/L / GGT: x           Vitamin B12 Vitamin B12, Serum: 529 pg/mL (05-17 @ 09:50)          RADIOLOGY    ANALYSIS AND PLAN:  This is a 68-year-old with episode of altered mental status.    For episode of altered mental status, suspect most likely secondary to underlying mild cognitive impairment, question slight dementia with patient wandering, appears more psychiatric in nature.  Suspect less likely this is a primary CNS event.   For history of hypothyroidism, we will place the patient on Synthroid.  We will check TSH.  For history of bipolar disorder, continue the patient on home psychiatric medication.  I would recommend consider psychiatric follow-up in regard to possibly adjustment of medications to keep the patient calm.  Attempt to contact spouseAlejandra at [phone] no response. yesterday and today 741am    48 minutes time was spent in patient plan of care, reviewing data, speaking to multidisciplinary care team with greater than 50% of time counseling care, coordination.    Thank you for the courtesy of consultation.  PATIENT HAS NOT YET BEEN SEEN AND EXAMINED TODAY. NOTE AND CHART REVIEWED IN AM AND EXAM FORM PREVIOUS.  ONCE PATIENT SEEN, CHART WILL BE UPDATE AT PRESENT NOTE IS INCOMPLETE     Neurology follow up note    MAXI [id number]      Interval History:    Patient feels ok no new complaints.    Allergies    No Known Allergies    Intolerances        MEDICATIONS    acetaminophen     Tablet .. 650 milliGRAM(s) Oral every 6 hours PRN  heparin   Injectable 5000 Unit(s) SubCutaneous every 8 hours  levETIRAcetam 500 milliGRAM(s) Oral two times a day  levothyroxine 25 MICROGram(s) Oral daily  melatonin 5 milliGRAM(s) Oral at bedtime  QUEtiapine 200 milliGRAM(s) Oral daily  tamsulosin 0.4 milliGRAM(s) Oral at bedtime              Vital Signs Last 24 Hrs  T(C): 36.4 (18 May 2024 06:15), Max: 36.8 (17 May 2024 20:54)  T(F): 97.6 (18 May 2024 06:15), Max: 98.2 (17 May 2024 20:54)  HR: 64 (18 May 2024 06:15) (64 - 89)  BP: 162/80 (18 May 2024 06:15) (110/69 - 162/80)  BP(mean): --  RR: 18 (18 May 2024 06:15) (16 - 18)  SpO2: 97% (18 May 2024 06:15) (92% - 97%)    Parameters below as of 17 May 2024 21:19  Patient On (Oxygen Delivery Method): room air      REVIEW OF SYSTEMS:  Limited secondary to the patient at times would not answer questions accordingly, but constitutionally, he denies fever, chills, or night sweats.  HEAD:  No headaches.  EYES:  No double vision.  EARS:  Hard of hearing.  NECK:  No neck pain.  CARDIOVASCULAR:  No chest pain.  RESPIRATORY:  No shortness of breath.  ABDOMEN:  No nausea, vomiting, or abdominal pain.  EXTREMITIES/NEUROLOGICAL:  No numbness or tingling.  MUSCULOSKELETAL:  No joint pain.    Again, these questions are limited.    PHYSICAL EXAMINATION:  HEAD:  Normocephalic, atraumatic.  EYES:  No scleral icterus.  EARS:  Hard of hearing.  NECK:  Supple.  CARDIOVASCULAR:  S1, S2 heard.  RESPIRATORY:  Air entry bilaterally.  ABDOMEN:  Soft and nontender.  EXTREMITIES:  No clubbing or cyanosis was noted.    NEUROLOGIC EXAMINATION:  The patient was awake, alert, in bed.  Location with questions was hospital, year was 1946, month was May.  Was able to name simple objects, but upon conversing with the patient at times, he would give irrelevant answers and attempt to humor.  Extraocular movements were intact.  Was able to make out number fingers in each eye.  Motor examination bilateral upper was 4/5 and bilateral lower 3+/5.  Sensory, bilateral upper and lower was intact to light touch       LABS:  CBC Full  -  ( 16 May 2024 09:48 )  WBC Count : 8.58 K/uL  RBC Count : 4.09 M/uL  Hemoglobin : 10.8 g/dL  Hematocrit : 33.5 %  Platelet Count - Automated : 251 K/uL  Mean Cell Volume : 81.9 fl  Mean Cell Hemoglobin : 26.4 pg  Mean Cell Hemoglobin Concentration : 32.2 gm/dL  Auto Neutrophil # : x  Auto Lymphocyte # : x  Auto Monocyte # : x  Auto Eosinophil # : x  Auto Basophil # : x  Auto Neutrophil % : x  Auto Lymphocyte % : x  Auto Monocyte % : x  Auto Eosinophil % : x  Auto Basophil % : x    Urinalysis Basic - ( 17 May 2024 16:35 )    Color: x / Appearance: x / SG: x / pH: x  Gluc: 101 mg/dL / Ketone: x  / Bili: x / Urobili: x   Blood: x / Protein: x / Nitrite: x   Leuk Esterase: x / RBC: x / WBC x   Sq Epi: x / Non Sq Epi: x / Bacteria: x      05-17    139  |  109<H>  |  25<H>  ----------------------------<  101<H>  4.2   |  26  |  1.20    Ca    9.2      17 May 2024 16:35  Phos  2.7     05-16    TPro  6.8  /  Alb  3.3  /  TBili  0.3  /  DBili  x   /  AST  16  /  ALT  22  /  AlkPhos  101  05-17    Hemoglobin A1C:     LIVER FUNCTIONS - ( 17 May 2024 16:35 )  Alb: 3.3 g/dL / Pro: 6.8 g/dL / ALK PHOS: 101 U/L / ALT: 22 U/L / AST: 16 U/L / GGT: x           Vitamin B12 Vitamin B12, Serum: 529 pg/mL (05-17 @ 09:50)          RADIOLOGY    ANALYSIS AND PLAN:  This is a 68-year-old with episode of altered mental status.    For episode of altered mental status, suspect most likely secondary to underlying mild cognitive impairment, question slight dementia with patient wandering, appears more psychiatric in nature.  Suspect less likely this is a primary CNS event.   For history of hypothyroidism, we will place the patient on Synthroid.  We will check TSH.  For history of bipolar disorder, continue the patient on home psychiatric medication.  I would recommend consider psychiatric follow-up in regard to possibly adjustment of medications to keep the patient calm.  Attempt to contact spouseAlejandra at [phone]     48 minutes time was spent in patient plan of care, reviewing data, speaking to multidisciplinary care team with greater than 50% of time counseling care, coordination.    Thank you for the courtesy of consultation.

## 2024-05-18 NOTE — BH CONSULTATION LIAISON PROGRESS NOTE - NSBHFUPINTERVALHXFT_PSY_A_CORE
Patient seen, evaluated and chart reviewed. Patient continues to present with elevated mood and grandiose delusions. He continues to believe that he is going to be the next .

## 2024-05-18 NOTE — BH CONSULTATION LIAISON PROGRESS NOTE - NSBHCHARTREVIEWLAB_PSY_A_CORE FT
05-17    139  |  109<H>  |  25<H>  ----------------------------<  101<H>  4.2   |  26  |  1.20    Ca    9.2      17 May 2024 16:35    TPro  6.8  /  Alb  3.3  /  TBili  0.3  /  DBili  x   /  AST  16  /  ALT  22  /  AlkPhos  101  05-17

## 2024-05-19 ENCOUNTER — TRANSCRIPTION ENCOUNTER (OUTPATIENT)
Age: 69
End: 2024-05-19

## 2024-05-19 RX ORDER — FERROUS SULFATE 325(65) MG
1 TABLET ORAL
Qty: 0 | Refills: 0 | DISCHARGE
Start: 2024-05-19

## 2024-05-19 RX ORDER — HEPARIN SODIUM 5000 [USP'U]/ML
5000 INJECTION INTRAVENOUS; SUBCUTANEOUS
Qty: 0 | Refills: 0 | DISCHARGE
Start: 2024-05-19

## 2024-05-19 RX ORDER — LITHIUM CARBONATE 300 MG/1
1 TABLET, EXTENDED RELEASE ORAL
Qty: 0 | Refills: 0 | DISCHARGE
Start: 2024-05-19

## 2024-05-19 RX ADMIN — LEVETIRACETAM 500 MILLIGRAM(S): 250 TABLET, FILM COATED ORAL at 19:59

## 2024-05-19 RX ADMIN — Medication 25 MICROGRAM(S): at 06:14

## 2024-05-19 RX ADMIN — LITHIUM CARBONATE 300 MILLIGRAM(S): 300 TABLET, EXTENDED RELEASE ORAL at 13:54

## 2024-05-19 RX ADMIN — LITHIUM CARBONATE 300 MILLIGRAM(S): 300 TABLET, EXTENDED RELEASE ORAL at 23:23

## 2024-05-19 RX ADMIN — TAMSULOSIN HYDROCHLORIDE 0.4 MILLIGRAM(S): 0.4 CAPSULE ORAL at 21:36

## 2024-05-19 RX ADMIN — LEVETIRACETAM 500 MILLIGRAM(S): 250 TABLET, FILM COATED ORAL at 06:14

## 2024-05-19 RX ADMIN — Medication 5 MILLIGRAM(S): at 21:36

## 2024-05-19 RX ADMIN — Medication 325 MILLIGRAM(S): at 11:40

## 2024-05-19 RX ADMIN — Medication 1 TABLET(S): at 11:40

## 2024-05-19 RX ADMIN — LITHIUM CARBONATE 300 MILLIGRAM(S): 300 TABLET, EXTENDED RELEASE ORAL at 06:14

## 2024-05-19 RX ADMIN — HEPARIN SODIUM 5000 UNIT(S): 5000 INJECTION INTRAVENOUS; SUBCUTANEOUS at 06:16

## 2024-05-19 RX ADMIN — QUETIAPINE FUMARATE 200 MILLIGRAM(S): 200 TABLET, FILM COATED ORAL at 11:40

## 2024-05-19 RX ADMIN — HEPARIN SODIUM 5000 UNIT(S): 5000 INJECTION INTRAVENOUS; SUBCUTANEOUS at 21:36

## 2024-05-19 NOTE — PROGRESS NOTE ADULT - SUBJECTIVE AND OBJECTIVE BOX
Date of Service 05-19-24 @ 16:10    Patient is a 68y old  Male who presents with a chief complaint of AMS (19 May 2024 11:53)      INTERVAL /OVERNIGHT EVENTS: still confused    MEDICATIONS  (STANDING):  ferrous    sulfate 325 milliGRAM(s) Oral daily  heparin   Injectable 5000 Unit(s) SubCutaneous every 8 hours  levETIRAcetam 500 milliGRAM(s) Oral two times a day  levothyroxine 25 MICROGram(s) Oral daily  lithium 300 milliGRAM(s) Oral three times a day  melatonin 5 milliGRAM(s) Oral at bedtime  multivitamin 1 Tablet(s) Oral daily  QUEtiapine 200 milliGRAM(s) Oral daily  tamsulosin 0.4 milliGRAM(s) Oral at bedtime    MEDICATIONS  (PRN):  acetaminophen     Tablet .. 650 milliGRAM(s) Oral every 6 hours PRN Temp greater or equal to 38C (100.4F), Mild Pain (1 - 3)      Allergies    No Known Allergies    Intolerances        REVIEW OF SYSTEMS: unable to obtain      Vital Signs Last 24 Hrs  T(C): 36.7 (19 May 2024 12:24), Max: 36.7 (19 May 2024 12:24)  T(F): 98 (19 May 2024 12:24), Max: 98 (19 May 2024 12:24)  HR: 70 (19 May 2024 12:24) (61 - 81)  BP: 144/86 (19 May 2024 12:24) (130/75 - 145/82)  BP(mean): --  RR: 18 (19 May 2024 12:24) (17 - 18)  SpO2: 96% (19 May 2024 12:24) (95% - 96%)    Parameters below as of 19 May 2024 12:24  Patient On (Oxygen Delivery Method): room air        PHYSICAL EXAM:  GENERAL: NAD, well-groomed, well-developed  HEAD:  Atraumatic, Normocephalic  EYES: EOMI, PERRLA, conjunctiva and sclera clear  ENMT: No tonsillar erythema, exudates, or enlargement; Moist mucous membranes, Good dentition, No lesions  NECK: Supple, No JVD, Normal thyroid  NERVOUS SYSTEM:  Alert & awake; Motor Strength 5/5 B/L upper and lower extremities; DTRs 2+ intact and symmetric  CHEST/LUNG: Clear to auscultation bilaterally; No rales, rhonchi, wheezing, or rubs  HEART: Regular rate and rhythm; No murmurs, rubs, or gallops  ABDOMEN: Soft, Nontender, Nondistended; Bowel sounds present  EXTREMITIES:  2+ Peripheral Pulses, No clubbing, cyanosis, or edema  LYMPH: No lymphadenopathy noted  SKIN: No rashes or lesions    LABS:      Ca    9.2        17 May 2024 16:35        Urinalysis Basic - ( 17 May 2024 16:35 )    Color: x / Appearance: x / SG: x / pH: x  Gluc: 101 mg/dL / Ketone: x  / Bili: x / Urobili: x   Blood: x / Protein: x / Nitrite: x   Leuk Esterase: x / RBC: x / WBC x   Sq Epi: x / Non Sq Epi: x / Bacteria: x      CAPILLARY BLOOD GLUCOSE          RADIOLOGY & ADDITIONAL TESTS:    Notes Reviewed:  [x ] YES  [ ] NO    Care Discussed with Consultants/Other Providers [x ] YES  [ ] NO

## 2024-05-19 NOTE — DISCHARGE NOTE PROVIDER - CARE PROVIDERS DIRECT ADDRESSES
,[email or web address]

## 2024-05-19 NOTE — PHARMACOTHERAPY INTERVENTION NOTE - COMMENTS
Completed medication reconciliation per discussion with patient and pharmacy fills.  Informed Dr. Dumont.

## 2024-05-19 NOTE — DISCHARGE NOTE PROVIDER - NSDCFUSCHEDAPPT_GEN_ALL_CORE_FT
22-Dr Senior  Pt: Laure Leung  : 1943  The pharmacy called they have questions regarding her [place]. Please call: [phone] and use reference number#[id number]   Shane Goodman  [facility]  [facility] [id number]  Scheduled Appointment: 05/23/2024

## 2024-05-19 NOTE — DISCHARGE NOTE PROVIDER - NSDCMRMEDTOKEN_GEN_ALL_CORE_FT
acetaminophen 325 mg oral tablet: 2 tab(s) orally every 6 hours As needed Mild Pain (1 - 3)  ferrous sulfate 325 mg (65 mg elemental iron) oral tablet: 1 tab(s) orally once a day  heparin 5000 units/mL injectable solution: 5,000 unit(s) injectable 2 times a day  levETIRAcetam 500 mg oral tablet: 1 tab(s) orally 2 times a day  levothyroxine 25 mcg (0.025 mg) oral capsule: 1 orally once a day  lithium 300 mg oral tablet: 1 tab(s) orally 3 times a day  melatonin 5 mg oral tablet: 1 tab(s) orally once a day (at bedtime)  Multiple Vitamins oral tablet: 1 tab(s) orally once a day  SEROquel 200 mg oral tablet: 1 tab(s) orally once a day  tamsulosin 0.4 mg oral capsule: 1 orally 2 times a day   acetaminophen 325 mg oral tablet: 2 tab(s) orally every 6 hours As needed Mild Pain (1 - 3)  ferrous sulfate 325 mg (65 mg elemental iron) oral tablet: 1 tab(s) orally once a day  heparin 5000 units/mL injectable solution: 5,000 unit(s) injectable 2 times a day  levETIRAcetam 500 mg oral tablet: 1 tab(s) orally 2 times a day  levothyroxine 25 mcg (0.025 mg) oral capsule: 1 orally once a day  melatonin 5 mg oral tablet: 1 tab(s) orally once a day (at bedtime)  Multiple Vitamins oral tablet: 1 tab(s) orally once a day  SEROquel 200 mg oral tablet: 1 tab(s) orally once a day  tamsulosin 0.4 mg oral capsule: 1 orally 2 times a day

## 2024-05-19 NOTE — DISCHARGE NOTE PROVIDER - CARE PROVIDER_API CALL
Leandro Floyd  Psychiatry  [address] [place] [address]  Phone: [phone]  Fax: [phone]  Follow Up Time:     HENNA FRENCH MD  Phone: [phone]  Fax: [phone]  Follow Up Time:

## 2024-05-19 NOTE — DISCHARGE NOTE PROVIDER - NSDCHC_MEDRECSTATUS_GEN_ALL_CORE
JOSE MANUEL--    The pharmacist at [facility] ([facility]). They have mistakenly given the patient the wrong dose of Ozempic. He was prescribed 0.25 mg starting dose and they gave him the 1 mg dose. They spoke tot he patient and he stated he had no side effects from it so they just wanted to report this and inform us he was given a 1 month supply of the wrong medication.   I spoke to the patient my self to check if he had any issues or side effects of this dose. He stated he had not, he was doing fine. I informed him if that changed to let us know.   After speaking with you about this I informed the pharmacy it was okay to cancel the 0.25 mg script since he was already started on the 1 mg dose. They have asked for a new script to be sent and they would document on the other script there error. I have updated the chart.    Admission Reconciliation is Completed  Discharge Reconciliation is Completed

## 2024-05-19 NOTE — PROGRESS NOTE ADULT - SUBJECTIVE AND OBJECTIVE BOX
Neurology follow up note    MAXI [id number]      Interval History:    Patient feels ok no new complaints.    Allergies    No Known Allergies    Intolerances        MEDICATIONS    acetaminophen     Tablet .. 650 milliGRAM(s) Oral every 6 hours PRN  ferrous    sulfate 325 milliGRAM(s) Oral daily  heparin   Injectable 5000 Unit(s) SubCutaneous every 8 hours  levETIRAcetam 500 milliGRAM(s) Oral two times a day  levothyroxine 25 MICROGram(s) Oral daily  lithium 300 milliGRAM(s) Oral three times a day  melatonin 5 milliGRAM(s) Oral at bedtime  multivitamin 1 Tablet(s) Oral daily  QUEtiapine 200 milliGRAM(s) Oral daily  tamsulosin 0.4 milliGRAM(s) Oral at bedtime              Vital Signs Last 24 Hrs  T(C): 36.4 (19 May 2024 06:16), Max: 36.4 (19 May 2024 06:16)  T(F): 97.5 (19 May 2024 06:16), Max: 97.5 (19 May 2024 06:16)  HR: 61 (19 May 2024 06:16) (61 - 81)  BP: 145/82 (19 May 2024 06:16) (130/75 - 145/82)  BP(mean): --  RR: 17 (19 May 2024 06:16) (17 - 18)  SpO2: 95% (19 May 2024 06:16) (95% - 98%)    Parameters below as of 19 May 2024 06:16  Patient On (Oxygen Delivery Method): room air        REVIEW OF SYSTEMS:  Limited secondary to the patient at times would not answer questions accordingly, but constitutionally, he denies fever, chills, or night sweats.  HEAD:  No headaches.  EYES:  No double vision.  EARS:  Hard of hearing.  NECK:  No neck pain.  CARDIOVASCULAR:  No chest pain.  RESPIRATORY:  No shortness of breath.  ABDOMEN:  No nausea, vomiting, or abdominal pain.  EXTREMITIES/NEUROLOGICAL:  No numbness or tingling.  MUSCULOSKELETAL:  No joint pain.    Again, these questions are limited.    PHYSICAL EXAMINATION:  HEAD:  Normocephalic, atraumatic.  EYES:  No scleral icterus.  EARS:  Hard of hearing.  NECK:  Supple.  CARDIOVASCULAR:  S1, S2 heard.  RESPIRATORY:  Air entry bilaterally.  ABDOMEN:  Soft and nontender.  EXTREMITIES:  No clubbing or cyanosis was noted.    NEUROLOGIC EXAMINATION:  The patient was awake, alert, in bed.  Location with questions was hospital, year was 1944, month was May.  Was able to name simple objects, but upon conversing with the patient at times, he would give irrelevant answers and attempt to humor.  Extraocular movements were intact.  Was able to make out number fingers in each eye.  Motor examination bilateral upper was 4/5 and bilateral lower 3+/5.  Sensory, bilateral upper and lower was intact to light touch       LABS:    Urinalysis Basic - ( 17 May 2024 16:35 )    Color: x / Appearance: x / SG: x / pH: x  Gluc: 101 mg/dL / Ketone: x  / Bili: x / Urobili: x   Blood: x / Protein: x / Nitrite: x   Leuk Esterase: x / RBC: x / WBC x   Sq Epi: x / Non Sq Epi: x / Bacteria: x      05-17    139  |  109<H>  |  25<H>  ----------------------------<  101<H>  4.2   |  26  |  1.20    Ca    9.2      17 May 2024 16:35    TPro  6.8  /  Alb  3.3  /  TBili  0.3  /  DBili  x   /  AST  16  /  ALT  22  /  AlkPhos  101  05-17    Hemoglobin A1C:     LIVER FUNCTIONS - ( 17 May 2024 16:35 )  Alb: 3.3 g/dL / Pro: 6.8 g/dL / ALK PHOS: 101 U/L / ALT: 22 U/L / AST: 16 U/L / GGT: x           Vitamin B12         RADIOLOGY    ANALYSIS AND PLAN:  This is a 68-year-old with episode of altered mental status.    For episode of altered mental status, suspect most likely secondary to underlying mild cognitive impairment, question slight dementia with patient wandering, appears more psychiatric in nature.  Suspect less likely this is a primary CNS event.   For history of hypothyroidism, we will place the patient on Synthroid.  We will check TSH.  For history of bipolar disorder, continue the patient on home psychiatric medication.  I would recommend consider psychiatric follow-up in regard to possibly adjustment of medications to keep the patient calm.  Attempt to contact spouseAlejandra at [phone]     48 minutes time was spent in patient plan of care, reviewing data, speaking to multidisciplinary care team with greater than 50% of time counseling care, coordination.    Thank you for the courtesy of consultation.    PATIENT HAS NOT YET BEEN SEEN AND EXAMINED TODAY. NOTE AND CHART REVIEWED IN AM AND EXAM FORM PREVIOUS.  ONCE PATIENT SEEN, CHART WILL BE UPDATE AT PRESENT NOTE IS INCOMPLETE     Neurology follow up note    MAXI [id number]      Interval History:    Patient feels ok no new complaints having breakfast     Allergies    No Known Allergies    Intolerances        MEDICATIONS    acetaminophen     Tablet .. 650 milliGRAM(s) Oral every 6 hours PRN  ferrous    sulfate 325 milliGRAM(s) Oral daily  heparin   Injectable 5000 Unit(s) SubCutaneous every 8 hours  levETIRAcetam 500 milliGRAM(s) Oral two times a day  levothyroxine 25 MICROGram(s) Oral daily  lithium 300 milliGRAM(s) Oral three times a day  melatonin 5 milliGRAM(s) Oral at bedtime  multivitamin 1 Tablet(s) Oral daily  QUEtiapine 200 milliGRAM(s) Oral daily  tamsulosin 0.4 milliGRAM(s) Oral at bedtime              Vital Signs Last 24 Hrs  T(C): 36.4 (19 May 2024 06:16), Max: 36.4 (19 May 2024 06:16)  T(F): 97.5 (19 May 2024 06:16), Max: 97.5 (19 May 2024 06:16)  HR: 61 (19 May 2024 06:16) (61 - 81)  BP: 145/82 (19 May 2024 06:16) (130/75 - 145/82)  BP(mean): --  RR: 17 (19 May 2024 06:16) (17 - 18)  SpO2: 95% (19 May 2024 06:16) (95% - 98%)    Parameters below as of 19 May 2024 06:16  Patient On (Oxygen Delivery Method): room air        REVIEW OF SYSTEMS:  Limited secondary to the patient at times would not answer questions accordingly, but constitutionally, he denies fever, chills, or night sweats.  HEAD:  No headaches.  EYES:  No double vision.  EARS:  Hard of hearing.  NECK:  No neck pain.  CARDIOVASCULAR:  No chest pain.  RESPIRATORY:  No shortness of breath.  ABDOMEN:  No nausea, vomiting, or abdominal pain.  EXTREMITIES/NEUROLOGICAL:  No numbness or tingling.  MUSCULOSKELETAL:  No joint pain.    Again, these questions are limited.    PHYSICAL EXAMINATION:  HEAD:  Normocephalic, atraumatic.  EYES:  No scleral icterus.  EARS:  Hard of hearing.  NECK:  Supple.  CARDIOVASCULAR:  S1, S2 heard.  RESPIRATORY:  Air entry bilaterally.  ABDOMEN:  Soft and nontender.  EXTREMITIES:  No clubbing or cyanosis was noted.    NEUROLOGIC EXAMINATION:  The patient was awake, alert, in bed.  Location with questions was hospital, year was 1946, month was May.  Was able to name simple objects, but upon conversing with the patient at times, he would give irrelevant answers and attempt to humor.  Extraocular movements were intact.  Was able to make out number fingers in each eye.  Motor examination bilateral upper was 4/5 and bilateral lower 3+/5.  Sensory, bilateral upper and lower was intact to light touch       LABS:    Urinalysis Basic - ( 17 May 2024 16:35 )    Color: x / Appearance: x / SG: x / pH: x  Gluc: 101 mg/dL / Ketone: x  / Bili: x / Urobili: x   Blood: x / Protein: x / Nitrite: x   Leuk Esterase: x / RBC: x / WBC x   Sq Epi: x / Non Sq Epi: x / Bacteria: x      05-17    139  |  109<H>  |  25<H>  ----------------------------<  101<H>  4.2   |  26  |  1.20    Ca    9.2      17 May 2024 16:35    TPro  6.8  /  Alb  3.3  /  TBili  0.3  /  DBili  x   /  AST  16  /  ALT  22  /  AlkPhos  101  05-17    Hemoglobin A1C:     LIVER FUNCTIONS - ( 17 May 2024 16:35 )  Alb: 3.3 g/dL / Pro: 6.8 g/dL / ALK PHOS: 101 U/L / ALT: 22 U/L / AST: 16 U/L / GGT: x           Vitamin B12         RADIOLOGY    ANALYSIS AND PLAN:  This is a 68-year-old with episode of altered mental status.    For episode of altered mental status, suspect most likely secondary to underlying mild cognitive impairment, question slight dementia with patient wandering, appears more psychiatric in nature.  Suspect less likely this is a primary CNS event.   For history of hypothyroidism, we will place the patient on Synthroid.  We will check TSH.  For history of bipolar disorder, continue the patient on home psychiatric medication.  I would recommend consider psychiatric follow-up in regard to possibly adjustment of medications to keep the patient calm.  Attempt to contact spouseAlejandra at [phone] in the past     47 minutes time was spent in patient plan of care, reviewing data, speaking to multidisciplinary care team with greater than 50% of time counseling care, coordination.    Thank you for the courtesy of consultation.

## 2024-05-19 NOTE — DISCHARGE NOTE PROVIDER - NSDCCPCAREPLAN_GEN_ALL_CORE_FT
PRINCIPAL DISCHARGE DIAGNOSIS  Diagnosis: Altered mental status  Assessment and Plan of Treatment: follow up with psychiatrist

## 2024-05-19 NOTE — DISCHARGE NOTE PROVIDER - HOSPITAL COURSE
admitted for AMS  likely manic phase of bipolar disorder  possible medication non compliance  needs inpatient psych management  transfer to psych facility per Dr. Floyd

## 2024-05-20 LAB
AMMONIA BLD-MCNC: 28 UMOL/L — SIGNIFICANT CHANGE UP (ref 11–32)
SARS-COV-2 RNA SPEC QL NAA+PROBE: SIGNIFICANT CHANGE UP

## 2024-05-20 PROCEDURE — 99231 SBSQ HOSP IP/OBS SF/LOW 25: CPT

## 2024-05-20 RX ADMIN — LEVETIRACETAM 500 MILLIGRAM(S): 250 TABLET, FILM COATED ORAL at 18:16

## 2024-05-20 RX ADMIN — HEPARIN SODIUM 5000 UNIT(S): 5000 INJECTION INTRAVENOUS; SUBCUTANEOUS at 21:34

## 2024-05-20 RX ADMIN — LITHIUM CARBONATE 300 MILLIGRAM(S): 300 TABLET, EXTENDED RELEASE ORAL at 13:24

## 2024-05-20 RX ADMIN — LEVETIRACETAM 500 MILLIGRAM(S): 250 TABLET, FILM COATED ORAL at 06:16

## 2024-05-20 RX ADMIN — LITHIUM CARBONATE 300 MILLIGRAM(S): 300 TABLET, EXTENDED RELEASE ORAL at 21:34

## 2024-05-20 RX ADMIN — HEPARIN SODIUM 5000 UNIT(S): 5000 INJECTION INTRAVENOUS; SUBCUTANEOUS at 07:10

## 2024-05-20 RX ADMIN — QUETIAPINE FUMARATE 200 MILLIGRAM(S): 200 TABLET, FILM COATED ORAL at 12:19

## 2024-05-20 RX ADMIN — Medication 5 MILLIGRAM(S): at 21:34

## 2024-05-20 RX ADMIN — Medication 1 TABLET(S): at 12:20

## 2024-05-20 RX ADMIN — TAMSULOSIN HYDROCHLORIDE 0.4 MILLIGRAM(S): 0.4 CAPSULE ORAL at 21:34

## 2024-05-20 RX ADMIN — LITHIUM CARBONATE 300 MILLIGRAM(S): 300 TABLET, EXTENDED RELEASE ORAL at 07:08

## 2024-05-20 RX ADMIN — Medication 25 MICROGRAM(S): at 06:16

## 2024-05-20 RX ADMIN — Medication 325 MILLIGRAM(S): at 12:19

## 2024-05-20 NOTE — PROGRESS NOTE ADULT - SUBJECTIVE AND OBJECTIVE BOX
Neurology follow up note    MAXI [id number]      Interval History:    Patient feels ok no new complaints.    Allergies    No Known Allergies    Intolerances        MEDICATIONS    acetaminophen     Tablet .. 650 milliGRAM(s) Oral every 6 hours PRN  ferrous    sulfate 325 milliGRAM(s) Oral daily  heparin   Injectable 5000 Unit(s) SubCutaneous every 8 hours  levETIRAcetam 500 milliGRAM(s) Oral two times a day  levothyroxine 25 MICROGram(s) Oral daily  lithium 300 milliGRAM(s) Oral three times a day  melatonin 5 milliGRAM(s) Oral at bedtime  multivitamin 1 Tablet(s) Oral daily  QUEtiapine 200 milliGRAM(s) Oral daily  tamsulosin 0.4 milliGRAM(s) Oral at bedtime              Vital Signs Last 24 Hrs  T(C): 36.7 (20 May 2024 05:06), Max: 36.7 (19 May 2024 12:24)  T(F): 98.1 (20 May 2024 05:06), Max: 98.1 (20 May 2024 05:06)  HR: 73 (20 May 2024 05:06) (69 - 73)  BP: 136/73 (20 May 2024 05:06) (133/75 - 144/86)  BP(mean): --  RR: 16 (20 May 2024 05:06) (16 - 18)  SpO2: 95% (20 May 2024 05:06) (95% - 96%)    Parameters below as of 20 May 2024 05:06  Patient On (Oxygen Delivery Method): room air      REVIEW OF SYSTEMS:  Limited secondary to the patient at times would not answer questions accordingly, but constitutionally, he denies fever, chills, or night sweats.  HEAD:  No headaches.  EYES:  No double vision.  EARS:  Hard of hearing.  NECK:  No neck pain.  CARDIOVASCULAR:  No chest pain.  RESPIRATORY:  No shortness of breath.  ABDOMEN:  No nausea, vomiting, or abdominal pain.  EXTREMITIES/NEUROLOGICAL:  No numbness or tingling.  MUSCULOSKELETAL:  No joint pain.    Again, these questions are limited.    PHYSICAL EXAMINATION:  HEAD:  Normocephalic, atraumatic.  EYES:  No scleral icterus.  EARS:  Hard of hearing.  NECK:  Supple.  CARDIOVASCULAR:  S1, S2 heard.  RESPIRATORY:  Air entry bilaterally.  ABDOMEN:  Soft and nontender.  EXTREMITIES:  No clubbing or cyanosis was noted.    NEUROLOGIC EXAMINATION:  The patient was awake, alert, in bed.  Location with questions was hospital,   Was able to name simple objects, but upon conversing with the patient at times, he would give irrelevant answers and attempt to humor.  Extraocular movements were intact.  Was able to make out number fingers in each eye.  Motor examination bilateral upper was 4/5 and bilateral lower 3+/5.  Sensory, bilateral upper and lower was intact to light touch       LABS:            Hemoglobin A1C:       Vitamin B12         RADIOLOGY      ANALYSIS AND PLAN:  This is a 68-year-old with episode of altered mental status.    For episode of altered mental status, suspect most likely secondary to underlying mild cognitive impairment, question slight dementia with patient wandering, appears more psychiatric in nature.  Suspect less likely this is a primary CNS event.   For history of hypothyroidism, we will place the patient on Synthroid.  We will check TSH.  For history of bipolar disorder, continue the patient on home psychiatric medication.  I would recommend consider psychiatric follow-up in regard to possibly adjustment of medications to keep the patient calm.  Attempt to contact spouse, Alejandra at [phone] in the past   neurologic wise stable for discharge     44 minutes time was spent in patient plan of care, reviewing data, speaking to multidisciplinary care team with greater than 50% of time counseling care, coordination.    Thank you for the courtesy of consultation.

## 2024-05-20 NOTE — PROGRESS NOTE ADULT - SUBJECTIVE AND OBJECTIVE BOX
Date of Service 05-20-24 @ 14:30    Patient is a 68y old  Male who presents with a chief complaint of AMS (20 May 2024 11:46)      INTERVAL /OVERNIGHT EVENTS: pleasantly confused    MEDICATIONS  (STANDING):  ferrous    sulfate 325 milliGRAM(s) Oral daily  heparin   Injectable 5000 Unit(s) SubCutaneous every 8 hours  levETIRAcetam 500 milliGRAM(s) Oral two times a day  levothyroxine 25 MICROGram(s) Oral daily  lithium 300 milliGRAM(s) Oral three times a day  melatonin 5 milliGRAM(s) Oral at bedtime  multivitamin 1 Tablet(s) Oral daily  QUEtiapine 200 milliGRAM(s) Oral daily  tamsulosin 0.4 milliGRAM(s) Oral at bedtime    MEDICATIONS  (PRN):  acetaminophen     Tablet .. 650 milliGRAM(s) Oral every 6 hours PRN Temp greater or equal to 38C (100.4F), Mild Pain (1 - 3)      Allergies    No Known Allergies    Intolerances        REVIEW OF SYSTEMS:  unable to obtain    Vital Signs Last 24 Hrs  T(C): 36.5 (20 May 2024 12:07), Max: 36.7 (20 May 2024 05:06)  T(F): 97.7 (20 May 2024 12:07), Max: 98.1 (20 May 2024 05:06)  HR: 73 (20 May 2024 12:07) (69 - 73)  BP: 137/77 (20 May 2024 12:07) (133/75 - 137/77)  BP(mean): --  RR: 15 (20 May 2024 12:07) (15 - 18)  SpO2: 96% (20 May 2024 12:07) (95% - 96%)    Parameters below as of 20 May 2024 12:07  Patient On (Oxygen Delivery Method): room air        PHYSICAL EXAM:  GENERAL: NAD, well-groomed, well-developed  HEAD:  Atraumatic, Normocephalic  EYES: EOMI, PERRLA, conjunctiva and sclera clear  ENMT: No tonsillar erythema, exudates, or enlargement; Moist mucous membranes, Good dentition, No lesions  NECK: Supple, No JVD, Normal thyroid  NERVOUS SYSTEM:  Alert & awake; Motor Strength 5/5 B/L upper and lower extremities; DTRs 2+ intact and symmetric  CHEST/LUNG: Clear to auscultation bilaterally; No rales, rhonchi, wheezing, or rubs  HEART: Regular rate and rhythm; No murmurs, rubs, or gallops  ABDOMEN: Soft, Nontender, Nondistended; Bowel sounds present  EXTREMITIES:  2+ Peripheral Pulses, No clubbing, cyanosis, or edema  LYMPH: No lymphadenopathy noted  SKIN: No rashes or lesions    LABS:              CAPILLARY BLOOD GLUCOSE          RADIOLOGY & ADDITIONAL TESTS:    Notes Reviewed:  [x ] YES  [ ] NO    Care Discussed with Consultants/Other Providers [x ] YES  [ ] NO

## 2024-05-21 PROCEDURE — 99231 SBSQ HOSP IP/OBS SF/LOW 25: CPT

## 2024-05-21 RX ADMIN — LITHIUM CARBONATE 300 MILLIGRAM(S): 300 TABLET, EXTENDED RELEASE ORAL at 13:24

## 2024-05-21 RX ADMIN — Medication 5 MILLIGRAM(S): at 22:38

## 2024-05-21 RX ADMIN — TAMSULOSIN HYDROCHLORIDE 0.4 MILLIGRAM(S): 0.4 CAPSULE ORAL at 22:38

## 2024-05-21 RX ADMIN — LEVETIRACETAM 500 MILLIGRAM(S): 250 TABLET, FILM COATED ORAL at 06:07

## 2024-05-21 RX ADMIN — HEPARIN SODIUM 5000 UNIT(S): 5000 INJECTION INTRAVENOUS; SUBCUTANEOUS at 05:10

## 2024-05-21 RX ADMIN — Medication 25 MICROGRAM(S): at 05:10

## 2024-05-21 RX ADMIN — LITHIUM CARBONATE 300 MILLIGRAM(S): 300 TABLET, EXTENDED RELEASE ORAL at 06:07

## 2024-05-21 RX ADMIN — Medication 325 MILLIGRAM(S): at 11:11

## 2024-05-21 RX ADMIN — Medication 1 TABLET(S): at 11:11

## 2024-05-21 RX ADMIN — HEPARIN SODIUM 5000 UNIT(S): 5000 INJECTION INTRAVENOUS; SUBCUTANEOUS at 22:38

## 2024-05-21 RX ADMIN — LEVETIRACETAM 500 MILLIGRAM(S): 250 TABLET, FILM COATED ORAL at 17:26

## 2024-05-21 RX ADMIN — QUETIAPINE FUMARATE 200 MILLIGRAM(S): 200 TABLET, FILM COATED ORAL at 11:12

## 2024-05-21 RX ADMIN — HEPARIN SODIUM 5000 UNIT(S): 5000 INJECTION INTRAVENOUS; SUBCUTANEOUS at 13:23

## 2024-05-21 NOTE — CHART NOTE - NSCHARTNOTEFT_GEN_A_CORE

## 2024-05-21 NOTE — PROGRESS NOTE ADULT - SUBJECTIVE AND OBJECTIVE BOX
Date of Service 05-21-24 @ 15:08    Patient is a 68y old  Male who presents with a chief complaint of AMS (21 May 2024 06:50)      INTERVAL /OVERNIGHT EVENTS: no new events    MEDICATIONS  (STANDING):  ferrous    sulfate 325 milliGRAM(s) Oral daily  heparin   Injectable 5000 Unit(s) SubCutaneous every 8 hours  levETIRAcetam 500 milliGRAM(s) Oral two times a day  levothyroxine 25 MICROGram(s) Oral daily  lithium 300 milliGRAM(s) Oral three times a day  melatonin 5 milliGRAM(s) Oral at bedtime  multivitamin 1 Tablet(s) Oral daily  QUEtiapine 200 milliGRAM(s) Oral daily  tamsulosin 0.4 milliGRAM(s) Oral at bedtime    MEDICATIONS  (PRN):  acetaminophen     Tablet .. 650 milliGRAM(s) Oral every 6 hours PRN Temp greater or equal to 38C (100.4F), Mild Pain (1 - 3)      Allergies    No Known Allergies    Intolerances        REVIEW OF SYSTEMS:  unable to obtain    Vital Signs Last 24 Hrs  T(C): 36.9 (21 May 2024 12:09), Max: 36.9 (20 May 2024 20:37)  T(F): 98.4 (21 May 2024 12:09), Max: 98.5 (20 May 2024 20:37)  HR: 86 (21 May 2024 12:09) (68 - 86)  BP: 120/80 (21 May 2024 12:09) (120/80 - 143/87)  BP(mean): --  RR: 18 (21 May 2024 12:09) (17 - 18)  SpO2: 96% (21 May 2024 12:09) (94% - 96%)    Parameters below as of 21 May 2024 12:09  Patient On (Oxygen Delivery Method): room air        PHYSICAL EXAM:  GENERAL: NAD, well-groomed, well-developed  HEAD:  Atraumatic, Normocephalic  EYES: EOMI, PERRLA, conjunctiva and sclera clear  ENMT: No tonsillar erythema, exudates, or enlargement; Moist mucous membranes, Good dentition, No lesions  NECK: Supple, No JVD, Normal thyroid  NERVOUS SYSTEM:  Alert & awake, Good concentration; Motor Strength 5/5 B/L upper and lower extremities; DTRs 2+ intact and symmetric  CHEST/LUNG: Clear to auscultation bilaterally; No rales, rhonchi, wheezing, or rubs  HEART: Regular rate and rhythm; No murmurs, rubs, or gallops  ABDOMEN: Soft, Nontender, Nondistended; Bowel sounds present  EXTREMITIES:  2+ Peripheral Pulses, No clubbing, cyanosis, or edema  LYMPH: No lymphadenopathy noted  SKIN: No rashes or lesions    LABS:              CAPILLARY BLOOD GLUCOSE          RADIOLOGY & ADDITIONAL TESTS:    Notes Reviewed:  [x ] YES  [ ] NO    Care Discussed with Consultants/Other Providers x[ ] YES  [ ] NO

## 2024-05-21 NOTE — PROGRESS NOTE ADULT - SUBJECTIVE AND OBJECTIVE BOX
Neurology follow up note    MAXI [id number]      Interval History:    Patient feels ok no new complaints.    Allergies    No Known Allergies    Intolerances        MEDICATIONS    acetaminophen     Tablet .. 650 milliGRAM(s) Oral every 6 hours PRN  ferrous    sulfate 325 milliGRAM(s) Oral daily  heparin   Injectable 5000 Unit(s) SubCutaneous every 8 hours  levETIRAcetam 500 milliGRAM(s) Oral two times a day  levothyroxine 25 MICROGram(s) Oral daily  lithium 300 milliGRAM(s) Oral three times a day  melatonin 5 milliGRAM(s) Oral at bedtime  multivitamin 1 Tablet(s) Oral daily  QUEtiapine 200 milliGRAM(s) Oral daily  tamsulosin 0.4 milliGRAM(s) Oral at bedtime              Vital Signs Last 24 Hrs  T(C): 36.6 (21 May 2024 05:02), Max: 36.9 (20 May 2024 20:37)  T(F): 97.8 (21 May 2024 05:02), Max: 98.5 (20 May 2024 20:37)  HR: 68 (21 May 2024 05:02) (68 - 85)  BP: 138/81 (21 May 2024 05:02) (137/77 - 143/87)  BP(mean): --  RR: 17 (21 May 2024 05:02) (15 - 17)  SpO2: 96% (21 May 2024 05:02) (94% - 96%)    Parameters below as of 21 May 2024 05:02  Patient On (Oxygen Delivery Method): room air      REVIEW OF SYSTEMS:  Limited secondary to the patient at times would not answer questions accordingly, but constitutionally, he denies fever, chills, or night sweats.  HEAD:  No headaches.  EYES:  No double vision.  EARS:  Hard of hearing.  NECK:  No neck pain.  CARDIOVASCULAR:  No chest pain.  RESPIRATORY:  No shortness of breath.  ABDOMEN:  No nausea, vomiting, or abdominal pain.  EXTREMITIES/NEUROLOGICAL:  No numbness or tingling.  MUSCULOSKELETAL:  No joint pain.    Again, these questions are limited.    PHYSICAL EXAMINATION:  HEAD:  Normocephalic, atraumatic.  EYES:  No scleral icterus.  EARS:  Hard of hearing.  NECK:  Supple.  CARDIOVASCULAR:  S1, S2 heard.  RESPIRATORY:  Air entry bilaterally.  ABDOMEN:  Soft and nontender.  EXTREMITIES:  No clubbing or cyanosis was noted.    NEUROLOGIC EXAMINATION:  The patient was awake, alert, in bed.  Location with questions was hospital,   Was able to name simple objects, but upon conversing with the patient at times, he would give irrelevant answers and attempt to humor.  Extraocular movements were intact.  Was able to make out number fingers in each eye.  Motor examination bilateral upper was 4/5 and bilateral lower 3+/5.  Sensory, bilateral upper and lower was intact to light touch       LABS:            Hemoglobin A1C:       Vitamin B12         RADIOLOGY  ANALYSIS AND PLAN:  This is a 68-year-old with episode of altered mental status.    For episode of altered mental status, suspect most likely secondary to underlying mild cognitive impairment, question slight dementia with patient wandering, appears more psychiatric in nature.  Suspect less likely this is a primary CNS event.   For history of hypothyroidism, we will place the patient on Synthroid.  We will check TSH.  For history of bipolar disorder, continue the patient on home psychiatric medication.  I would recommend consider psychiatric follow-up in regard to possibly adjustment of medications to keep the patient calm.  Attempt to contact spouse, Alejandra at [phone] in the past   ammonia levels ok   neurologic wise stable for discharge     44 minutes time was spent in patient plan of care, reviewing data, speaking to multidisciplinary care team with greater than 50% of time counseling care, coordination.    Thank you for the courtesy of consultation. Neurology follow up note    MAXI [id number]      Interval History:    Patient feels ok no new complaints walking around room     Allergies    No Known Allergies    Intolerances        MEDICATIONS    acetaminophen     Tablet .. 650 milliGRAM(s) Oral every 6 hours PRN  ferrous    sulfate 325 milliGRAM(s) Oral daily  heparin   Injectable 5000 Unit(s) SubCutaneous every 8 hours  levETIRAcetam 500 milliGRAM(s) Oral two times a day  levothyroxine 25 MICROGram(s) Oral daily  lithium 300 milliGRAM(s) Oral three times a day  melatonin 5 milliGRAM(s) Oral at bedtime  multivitamin 1 Tablet(s) Oral daily  QUEtiapine 200 milliGRAM(s) Oral daily  tamsulosin 0.4 milliGRAM(s) Oral at bedtime              Vital Signs Last 24 Hrs  T(C): 36.6 (21 May 2024 05:02), Max: 36.9 (20 May 2024 20:37)  T(F): 97.8 (21 May 2024 05:02), Max: 98.5 (20 May 2024 20:37)  HR: 68 (21 May 2024 05:02) (68 - 85)  BP: 138/81 (21 May 2024 05:02) (137/77 - 143/87)  BP(mean): --  RR: 17 (21 May 2024 05:02) (15 - 17)  SpO2: 96% (21 May 2024 05:02) (94% - 96%)    Parameters below as of 21 May 2024 05:02  Patient On (Oxygen Delivery Method): room air      REVIEW OF SYSTEMS:  Limited secondary to the patient at times would not answer questions accordingly, but constitutionally, he denies fever, chills, or night sweats.  HEAD:  No headaches.  EYES:  No double vision.  EARS:  Hard of hearing.  NECK:  No neck pain.  CARDIOVASCULAR:  No chest pain.  RESPIRATORY:  No shortness of breath.  ABDOMEN:  No nausea, vomiting, or abdominal pain.  EXTREMITIES/NEUROLOGICAL:  No numbness or tingling.  MUSCULOSKELETAL:  No joint pain.    Again, these questions are limited.    PHYSICAL EXAMINATION:  HEAD:  Normocephalic, atraumatic.  EYES:  No scleral icterus.  EARS:  Hard of hearing.  NECK:  Supple.  CARDIOVASCULAR:  S1, S2 heard.  RESPIRATORY:  Air entry bilaterally.  ABDOMEN:  Soft and nontender.  EXTREMITIES:  No clubbing or cyanosis was noted.    NEUROLOGIC EXAMINATION:  The patient was awake, alert, in bed.  Location with questions was hospital,   Was able to name simple objects, but upon conversing with the patient at times, he would give irrelevant answers and attempt to humor.  Extraocular movements were intact.  Was able to make out number fingers in each eye.  Motor examination bilateral upper was 4/5 and bilateral lower 3+/5.  Sensory, bilateral upper and lower was intact to light touch       LABS:            Hemoglobin A1C:       Vitamin B12         RADIOLOGY  ANALYSIS AND PLAN:  This is a 68-year-old with episode of altered mental status.    For episode of altered mental status, suspect most likely secondary to underlying mild cognitive impairment, question slight dementia with patient wandering, appears more psychiatric in nature.  Suspect less likely this is a primary CNS event.   For history of hypothyroidism, we will place the patient on Synthroid.  We will check TSH.  For history of bipolar disorder, continue the patient on home psychiatric medication.  I would recommend consider psychiatric follow-up in regard to possibly adjustment of medications to keep the patient calm.  Attempt to contact spouseAlejandra at [phone] in the past   ammonia levels ok   neurologic wise stable for discharge     44 minutes time was spent in patient plan of care, reviewing data, speaking to multidisciplinary care team with greater than 50% of time counseling care, coordination.    Thank you for the courtesy of consultation.

## 2024-05-21 NOTE — BH CONSULTATION LIAISON PROGRESS NOTE - NSBHFUPINTERVALHXFT_PSY_A_CORE
Patient seen, evaluated and chart reviewed. Patient continues to present with elevated mood and grandiose delusions. He continues to believe that he is going to be the next . He tends to walk in the hallway attempting to enter patients' rooms.

## 2024-05-22 ENCOUNTER — INPATIENT (INPATIENT)
Facility: HOSPITAL | Age: 69
LOS: 36 days | Discharge: HOME HEALTH | DRG: 885 | End: 2024-06-28
Attending: PSYCHIATRY & NEUROLOGY | Admitting: PSYCHIATRY & NEUROLOGY
Payer: MEDICARE

## 2024-05-22 ENCOUNTER — TRANSCRIPTION ENCOUNTER (OUTPATIENT)
Age: 69
End: 2024-05-22

## 2024-05-22 VITALS
DIASTOLIC BLOOD PRESSURE: 77 MMHG | WEIGHT: 220.02 LBS | HEIGHT: 71 IN | OXYGEN SATURATION: 98 % | SYSTOLIC BLOOD PRESSURE: 164 MMHG | HEART RATE: 80 BPM | RESPIRATION RATE: 18 BRPM | TEMPERATURE: 99 F

## 2024-05-22 VITALS
SYSTOLIC BLOOD PRESSURE: 137 MMHG | TEMPERATURE: 98 F | DIASTOLIC BLOOD PRESSURE: 78 MMHG | OXYGEN SATURATION: 94 % | RESPIRATION RATE: 17 BRPM | HEART RATE: 79 BPM

## 2024-05-22 DIAGNOSIS — Z98.890 OTHER SPECIFIED POSTPROCEDURAL STATES: Chronic | ICD-10-CM

## 2024-05-22 LAB — SARS-COV-2 RNA SPEC QL NAA+PROBE: SIGNIFICANT CHANGE UP

## 2024-05-22 PROCEDURE — 97535 SELF CARE MNGMENT TRAINING: CPT

## 2024-05-22 PROCEDURE — 82607 VITAMIN B-12: CPT

## 2024-05-22 PROCEDURE — 97162 PT EVAL MOD COMPLEX 30 MIN: CPT

## 2024-05-22 PROCEDURE — 83880 ASSAY OF NATRIURETIC PEPTIDE: CPT

## 2024-05-22 PROCEDURE — 85025 COMPLETE CBC W/AUTO DIFF WBC: CPT

## 2024-05-22 PROCEDURE — 97530 THERAPEUTIC ACTIVITIES: CPT

## 2024-05-22 PROCEDURE — 87635 SARS-COV-2 COVID-19 AMP PRB: CPT

## 2024-05-22 PROCEDURE — 83036 HEMOGLOBIN GLYCOSYLATED A1C: CPT

## 2024-05-22 PROCEDURE — 97116 GAIT TRAINING THERAPY: CPT

## 2024-05-22 PROCEDURE — 84443 ASSAY THYROID STIM HORMONE: CPT

## 2024-05-22 PROCEDURE — 80178 ASSAY OF LITHIUM: CPT

## 2024-05-22 PROCEDURE — 93005 ELECTROCARDIOGRAM TRACING: CPT

## 2024-05-22 PROCEDURE — 99238 HOSP IP/OBS DSCHRG MGMT 30/<: CPT

## 2024-05-22 PROCEDURE — 97165 OT EVAL LOW COMPLEX 30 MIN: CPT

## 2024-05-22 PROCEDURE — 93970 EXTREMITY STUDY: CPT

## 2024-05-22 PROCEDURE — 87086 URINE CULTURE/COLONY COUNT: CPT

## 2024-05-22 PROCEDURE — 99285 EMERGENCY DEPT VISIT HI MDM: CPT

## 2024-05-22 PROCEDURE — 84100 ASSAY OF PHOSPHORUS: CPT

## 2024-05-22 PROCEDURE — 83735 ASSAY OF MAGNESIUM: CPT

## 2024-05-22 PROCEDURE — 85027 COMPLETE CBC AUTOMATED: CPT

## 2024-05-22 PROCEDURE — 71045 X-RAY EXAM CHEST 1 VIEW: CPT

## 2024-05-22 PROCEDURE — 36415 COLL VENOUS BLD VENIPUNCTURE: CPT

## 2024-05-22 PROCEDURE — 81001 URINALYSIS AUTO W/SCOPE: CPT

## 2024-05-22 PROCEDURE — 82746 ASSAY OF FOLIC ACID SERUM: CPT

## 2024-05-22 PROCEDURE — 82140 ASSAY OF AMMONIA: CPT

## 2024-05-22 PROCEDURE — 80053 COMPREHEN METABOLIC PANEL: CPT

## 2024-05-22 RX ORDER — ACETAMINOPHEN 325 MG
650 TABLET ORAL EVERY 6 HOURS
Refills: 0 | Status: DISCONTINUED | OUTPATIENT
Start: 2024-05-22 | End: 2024-06-28

## 2024-05-22 RX ORDER — HEPARIN SODIUM 50 [USP'U]/ML
5000 INJECTION, SOLUTION INTRAVENOUS EVERY 8 HOURS
Refills: 0 | Status: DISCONTINUED | OUTPATIENT
Start: 2024-05-22 | End: 2024-05-24

## 2024-05-22 RX ORDER — LEVETIRACETAM 100 MG/ML
500 INJECTION INTRAVENOUS
Refills: 0 | Status: DISCONTINUED | OUTPATIENT
Start: 2024-05-22 | End: 2024-05-24

## 2024-05-22 RX ORDER — MAGNESIUM, ALUMINUM HYDROXIDE 400-400
30 TABLET,CHEWABLE ORAL EVERY 6 HOURS
Refills: 0 | Status: DISCONTINUED | OUTPATIENT
Start: 2024-05-22 | End: 2024-06-28

## 2024-05-22 RX ORDER — FERROUS SULFATE 325(65) MG
325 TABLET ORAL DAILY
Refills: 0 | Status: DISCONTINUED | OUTPATIENT
Start: 2024-05-22 | End: 2024-06-28

## 2024-05-22 RX ORDER — TAMSULOSIN HYDROCHLORIDE 0.4 MG/1
0.4 CAPSULE ORAL AT BEDTIME
Refills: 0 | Status: DISCONTINUED | OUTPATIENT
Start: 2024-05-22 | End: 2024-06-28

## 2024-05-22 RX ORDER — OLANZAPINE 2.5 MG/1
2.5 TABLET, FILM COATED ORAL EVERY 8 HOURS
Refills: 0 | Status: DISCONTINUED | OUTPATIENT
Start: 2024-05-22 | End: 2024-06-28

## 2024-05-22 RX ORDER — LEVOTHYROXINE SODIUM 25 MCG
25 TABLET ORAL DAILY
Refills: 0 | Status: DISCONTINUED | OUTPATIENT
Start: 2024-05-22 | End: 2024-06-28

## 2024-05-22 RX ADMIN — Medication 325 MILLIGRAM(S): at 11:56

## 2024-05-22 RX ADMIN — LEVETIRACETAM 500 MILLIGRAM(S): 250 TABLET, FILM COATED ORAL at 17:15

## 2024-05-22 RX ADMIN — HEPARIN SODIUM 5000 UNIT(S): 5000 INJECTION INTRAVENOUS; SUBCUTANEOUS at 14:30

## 2024-05-22 RX ADMIN — Medication 5 MILLIGRAM(S): at 21:21

## 2024-05-22 RX ADMIN — QUETIAPINE FUMARATE 200 MILLIGRAM(S): 200 TABLET, FILM COATED ORAL at 11:57

## 2024-05-22 RX ADMIN — LITHIUM CARBONATE 300 MILLIGRAM(S): 300 TABLET, EXTENDED RELEASE ORAL at 09:15

## 2024-05-22 RX ADMIN — LEVETIRACETAM 500 MILLIGRAM(S): 100 INJECTION INTRAVENOUS at 21:21

## 2024-05-22 RX ADMIN — Medication 25 MICROGRAM(S): at 05:45

## 2024-05-22 RX ADMIN — TAMSULOSIN HYDROCHLORIDE 0.4 MILLIGRAM(S): 0.4 CAPSULE ORAL at 21:21

## 2024-05-22 RX ADMIN — Medication 1 TABLET(S): at 11:56

## 2024-05-22 RX ADMIN — HEPARIN SODIUM 5000 UNIT(S): 5000 INJECTION INTRAVENOUS; SUBCUTANEOUS at 05:45

## 2024-05-22 RX ADMIN — LEVETIRACETAM 500 MILLIGRAM(S): 250 TABLET, FILM COATED ORAL at 05:45

## 2024-05-22 RX ADMIN — OLANZAPINE 2.5 MILLIGRAM(S): 2.5 TABLET, FILM COATED ORAL at 21:21

## 2024-05-22 NOTE — BH CONSULTATION LIAISON PROGRESS NOTE - CURRENT MEDICATION
MEDICATIONS  (STANDING):  ferrous    sulfate 325 milliGRAM(s) Oral daily  heparin   Injectable 5000 Unit(s) SubCutaneous every 8 hours  levETIRAcetam 500 milliGRAM(s) Oral two times a day  levothyroxine 25 MICROGram(s) Oral daily  lithium 300 milliGRAM(s) Oral three times a day  melatonin 5 milliGRAM(s) Oral at bedtime  multivitamin 1 Tablet(s) Oral daily  QUEtiapine 200 milliGRAM(s) Oral daily  tamsulosin 0.4 milliGRAM(s) Oral at bedtime    MEDICATIONS  (PRN):  acetaminophen     Tablet .. 650 milliGRAM(s) Oral every 6 hours PRN Temp greater or equal to 38C (100.4F), Mild Pain (1 - 3)  
MEDICATIONS  (STANDING):  ferrous    sulfate 325 milliGRAM(s) Oral daily  heparin   Injectable 5000 Unit(s) SubCutaneous every 8 hours  levETIRAcetam 500 milliGRAM(s) Oral two times a day  levothyroxine 25 MICROGram(s) Oral daily  lithium 300 milliGRAM(s) Oral three times a day  melatonin 5 milliGRAM(s) Oral at bedtime  multivitamin 1 Tablet(s) Oral daily  QUEtiapine 200 milliGRAM(s) Oral daily  tamsulosin 0.4 milliGRAM(s) Oral at bedtime    MEDICATIONS  (PRN):  acetaminophen     Tablet .. 650 milliGRAM(s) Oral every 6 hours PRN Temp greater or equal to 38C (100.4F), Mild Pain (1 - 3)  
MEDICATIONS  (STANDING):  heparin   Injectable 5000 Unit(s) SubCutaneous every 8 hours  levETIRAcetam 500 milliGRAM(s) Oral two times a day  levothyroxine 25 MICROGram(s) Oral daily  lithium 300 milliGRAM(s) Oral three times a day  melatonin 5 milliGRAM(s) Oral at bedtime  QUEtiapine 200 milliGRAM(s) Oral daily  tamsulosin 0.4 milliGRAM(s) Oral at bedtime    MEDICATIONS  (PRN):  acetaminophen     Tablet .. 650 milliGRAM(s) Oral every 6 hours PRN Temp greater or equal to 38C (100.4F), Mild Pain (1 - 3)  
MEDICATIONS  (STANDING):  ferrous    sulfate 325 milliGRAM(s) Oral daily  heparin   Injectable 5000 Unit(s) SubCutaneous every 8 hours  levETIRAcetam 500 milliGRAM(s) Oral two times a day  levothyroxine 25 MICROGram(s) Oral daily  lithium 300 milliGRAM(s) Oral three times a day  melatonin 5 milliGRAM(s) Oral at bedtime  multivitamin 1 Tablet(s) Oral daily  QUEtiapine 200 milliGRAM(s) Oral daily  tamsulosin 0.4 milliGRAM(s) Oral at bedtime    MEDICATIONS  (PRN):  acetaminophen     Tablet .. 650 milliGRAM(s) Oral every 6 hours PRN Temp greater or equal to 38C (100.4F), Mild Pain (1 - 3)

## 2024-05-22 NOTE — BH CONSULTATION LIAISON PROGRESS NOTE - NSBHFUPINTERVALHXFT_PSY_A_CORE
Patient seen, evaluated and chart reviewed. Patient continues to present with elevated mood and grandiose delusions, although he reports more less prominent symptoms. He continues to believe that he is going to be the next . He appears to be less intrusive than yesterday. Patient seen, evaluated and chart reviewed. Patient continues to present with elevated mood and grandiose delusions, although he reports more less prominent symptoms. He continues to believe that he is going to be the next . He appears to be less intrusive than yesterday. The wife requested that the Lithium to be stopped as he had "bad reactions in the past."

## 2024-05-22 NOTE — PROGRESS NOTE ADULT - SUBJECTIVE AND OBJECTIVE BOX
Patient is a 68y old  Male who presents with a chief complaint of AMS (22 May 2024 08:49)      INTERVAL HPI/OVERNIGHT EVENTS:  T(C): 37.1 (05-22-24 @ 05:17), Max: 37.1 (05-21-24 @ 19:54)  HR: 98 (05-22-24 @ 05:17) (86 - 98)  BP: 131/68 (05-22-24 @ 05:17) (120/80 - 137/81)  RR: 18 (05-22-24 @ 05:17) (18 - 18)  SpO2: 97% (05-22-24 @ 05:17) (96% - 97%)  Wt(kg): --  I&O's Summary    22 May 2024 07:01  -  22 May 2024 12:00  --------------------------------------------------------  IN: 560 mL / OUT: 0 mL / NET: 560 mL        LABS:              CAPILLARY BLOOD GLUCOSE                MEDICATIONS  (STANDING):  ferrous    sulfate 325 milliGRAM(s) Oral daily  heparin   Injectable 5000 Unit(s) SubCutaneous every 8 hours  levETIRAcetam 500 milliGRAM(s) Oral two times a day  levothyroxine 25 MICROGram(s) Oral daily  lithium 300 milliGRAM(s) Oral three times a day  melatonin 5 milliGRAM(s) Oral at bedtime  multivitamin 1 Tablet(s) Oral daily  QUEtiapine 200 milliGRAM(s) Oral daily  tamsulosin 0.4 milliGRAM(s) Oral at bedtime    MEDICATIONS  (PRN):  acetaminophen     Tablet .. 650 milliGRAM(s) Oral every 6 hours PRN Temp greater or equal to 38C (100.4F), Mild Pain (1 - 3)      REVIEW OF SYSTEMS:  unable to obtain    RADIOLOGY & ADDITIONAL TESTS:    Imaging Personally Reviewed:  [ ] YES  [ ] NO    Consultant(s) Notes Reviewed:  [ ] YES  [ ] NO    PHYSICAL EXAM:  GENERAL: NAD, well-groomed, well-developed  HEAD:  Atraumatic, Normocephalic  EYES: EOMI, PERRLA, conjunctiva and sclera clear  ENMT: No tonsillar erythema, exudates, or enlargement; Moist mucous membranes, Good dentition, No lesions  NECK: Supple, No JVD, Normal thyroid  NERVOUS SYSTEM:  Alert & Oriented X3, poor  concentration; Motor Strength 5/5 B/L upper and lower extremities; DTRs 2+ intact and symmetric  CHEST/LUNG: Clear to percussion bilaterally; No rales, rhonchi, wheezing, or rubs  HEART: Regular rate and rhythm; No murmurs, rubs, or gallops  ABDOMEN: Soft, Nontender, Nondistended; Bowel sounds present  EXTREMITIES:  2+ Peripheral Pulses, No clubbing, cyanosis, or edema  LYMPH: No lymphadenopathy noted  SKIN: No rashes or lesions    Care Discussed with Consultants/Other Providers [x ] YES  [ ] NO    advance care planning and advance directives discussed, including but not limited to long term care planning, and all forms reviewed [x]YES  [ ]NO  family care coordination and counseling provided [x]YES   [ ]NO  time spent greater than 55 min

## 2024-05-22 NOTE — PROGRESS NOTE ADULT - PROBLEM SELECTOR PLAN 1
? etiology - doubt toxic or metabolic, likely psych related  neuro eval with Dr. Webster appreciated  no acute medical issues  psych facility placement is appropriate
? etiology  neuro eval with Dr. Webster
? etiology  neuro eval with Dr. Webster appreciated
? etiology  neuro eval with Dr. Webster appreciated  no acute medical issues
? etiology - doubt toxic or metabolic, likely psych related  neuro eval with Dr. Webster appreciated  no acute medical issues
? etiology  neuro eval with Dr. Webster appreciated

## 2024-05-22 NOTE — BH CONSULTATION LIAISON PROGRESS NOTE - NSBHCHARTREVIEWVS_PSY_A_CORE FT
Vital Signs Last 24 Hrs  T(C): 36.5 (20 May 2024 12:07), Max: 36.7 (20 May 2024 05:06)  T(F): 97.7 (20 May 2024 12:07), Max: 98.1 (20 May 2024 05:06)  HR: 73 (20 May 2024 12:07) (69 - 73)  BP: 137/77 (20 May 2024 12:07) (133/75 - 137/77)  BP(mean): --  RR: 15 (20 May 2024 12:07) (15 - 18)  SpO2: 96% (20 May 2024 12:07) (95% - 96%)    Parameters below as of 20 May 2024 12:07  Patient On (Oxygen Delivery Method): room air    
Vital Signs Last 24 Hrs  T(C): 37.1 (21 May 2024 19:54), Max: 37.1 (21 May 2024 19:54)  T(F): 98.8 (21 May 2024 19:54), Max: 98.8 (21 May 2024 19:54)  HR: 98 (21 May 2024 19:54) (68 - 98)  BP: 137/81 (21 May 2024 19:54) (120/80 - 143/87)  BP(mean): --  RR: 18 (21 May 2024 19:54) (17 - 18)  SpO2: 96% (21 May 2024 19:54) (94% - 96%)    Parameters below as of 21 May 2024 19:54  Patient On (Oxygen Delivery Method): room air    
Vital Signs Last 24 Hrs  T(C): 36.6 (22 May 2024 12:29), Max: 37.1 (21 May 2024 19:54)  T(F): 97.9 (22 May 2024 12:29), Max: 98.8 (21 May 2024 19:54)  HR: 79 (22 May 2024 12:29) (79 - 98)  BP: 137/78 (22 May 2024 12:29) (131/68 - 137/81)  BP(mean): --  RR: 17 (22 May 2024 12:29) (17 - 18)  SpO2: 94% (22 May 2024 12:29) (94% - 97%)    Parameters below as of 22 May 2024 12:29  Patient On (Oxygen Delivery Method): room air    
Vital Signs Last 24 Hrs  T(C): 36.4 (18 May 2024 06:15), Max: 36.8 (17 May 2024 20:54)  T(F): 97.6 (18 May 2024 06:15), Max: 98.2 (17 May 2024 20:54)  HR: 64 (18 May 2024 06:15) (64 - 89)  BP: 162/80 (18 May 2024 06:15) (110/69 - 162/80)  BP(mean): --  RR: 18 (18 May 2024 06:15) (16 - 18)  SpO2: 97% (18 May 2024 06:15) (92% - 97%)    Parameters below as of 17 May 2024 21:19  Patient On (Oxygen Delivery Method): room air

## 2024-05-22 NOTE — PROGRESS NOTE ADULT - SUBJECTIVE AND OBJECTIVE BOX
Neurology follow up note    MAXI [id number]      Interval History:    Patient feels ok no new complaints.    Allergies    No Known Allergies    Intolerances        MEDICATIONS    acetaminophen     Tablet .. 650 milliGRAM(s) Oral every 6 hours PRN  ferrous    sulfate 325 milliGRAM(s) Oral daily  heparin   Injectable 5000 Unit(s) SubCutaneous every 8 hours  levETIRAcetam 500 milliGRAM(s) Oral two times a day  levothyroxine 25 MICROGram(s) Oral daily  lithium 300 milliGRAM(s) Oral three times a day  melatonin 5 milliGRAM(s) Oral at bedtime  multivitamin 1 Tablet(s) Oral daily  QUEtiapine 200 milliGRAM(s) Oral daily  tamsulosin 0.4 milliGRAM(s) Oral at bedtime              Vital Signs Last 24 Hrs  T(C): 37.1 (22 May 2024 05:17), Max: 37.1 (21 May 2024 19:54)  T(F): 98.7 (22 May 2024 05:17), Max: 98.8 (21 May 2024 19:54)  HR: 98 (22 May 2024 05:17) (86 - 98)  BP: 131/68 (22 May 2024 05:17) (120/80 - 137/81)  BP(mean): --  RR: 18 (22 May 2024 05:17) (18 - 18)  SpO2: 97% (22 May 2024 05:17) (96% - 97%)    Parameters below as of 22 May 2024 05:17  Patient On (Oxygen Delivery Method): room air      REVIEW OF SYSTEMS:  Limited secondary to the patient at times would not answer questions accordingly, but constitutionally, he denies fever, chills, or night sweats.  HEAD:  No headaches.  EYES:  No double vision.  EARS:  Hard of hearing.  NECK:  No neck pain.  CARDIOVASCULAR:  No chest pain.  RESPIRATORY:  No shortness of breath.  ABDOMEN:  No nausea, vomiting, or abdominal pain.  EXTREMITIES/NEUROLOGICAL:  No numbness or tingling.  MUSCULOSKELETAL:  No joint pain.    Again, these questions are limited.    PHYSICAL EXAMINATION:  HEAD:  Normocephalic, atraumatic.  EYES:  No scleral icterus.  EARS:  Hard of hearing.  NECK:  Supple.  CARDIOVASCULAR:  S1, S2 heard.  RESPIRATORY:  Air entry bilaterally.  ABDOMEN:  Soft and nontender.  EXTREMITIES:  No clubbing or cyanosis was noted.    NEUROLOGIC EXAMINATION:  The patient was awake, alert, in bed.  Location with questions was hospital,   Was able to name simple objects, but upon conversing with the patient at times, he would give irrelevant answers and attempt to humor.  Extraocular movements were intact.  Was able to make out number fingers in each eye.  Motor examination bilateral upper was 4/5 and bilateral lower 3+/5.  Sensory, bilateral upper and lower was intact to light touch       LABS:            Hemoglobin A1C:       Vitamin B12         RADIOLOGY  ANALYSIS AND PLAN:  This is a 68-year-old with episode of altered mental status.    For episode of altered mental status, suspect most likely secondary to underlying mild cognitive impairment, question slight dementia with patient wandering, appears more psychiatric in nature.  Suspect less likely this is a primary CNS event.   For history of hypothyroidism, we will place the patient on Synthroid.   For history of bipolar disorder, continue the patient on home psychiatric medication.  I would recommend consider psychiatric follow-up in regard to possibly adjustment of medications to keep the patient calm.  ammonia levels ok   fall precautions   neurologic wise stable for discharge     42 minutes time was spent in patient plan of care, reviewing data, speaking to multidisciplinary care team with greater than 50% of time counseling care, coordination.    Thank you for the courtesy of consultation.

## 2024-05-22 NOTE — BH CONSULTATION LIAISON PROGRESS NOTE - NSBHATTESTBILLING_PSY_A_CORE
66803-Fdashbnfdx OBS or IP - low complexity OR 25-34 mins
85338-Ixejarlmqx OBS or IP - low complexity OR 25-34 mins
36725-Kllpphxxhq OBS or IP - low complexity OR 25-34 mins
52778-Scfynocfag OBS or IP - low complexity OR 25-34 mins

## 2024-05-22 NOTE — PROGRESS NOTE ADULT - PROBLEM SELECTOR PLAN 3
? lithium compliance  psych eval with Dr. MARIOLA miranda
? lithium compliance  psych eval with Dr. MARIOLA miranda
? lithium compliance  psych eval with Dr. GARNETT appreciated  lithium per psych
? lithium compliance  psych eval with Dr. GARNETT appreciated  lithium per psych
? lithium compliance  psych eval with Dr. MARIOLA miranda
? lithium compliance  psych eval with Dr. GARNETT

## 2024-05-22 NOTE — PROGRESS NOTE ADULT - PROVIDER SPECIALTY LIST ADULT
Neurology
Family Medicine
Hospitalist
Neurology
Family Medicine

## 2024-05-22 NOTE — DISCHARGE NOTE NURSING/CASE MANAGEMENT/SOCIAL WORK - NSDCPEFALRISK_GEN_ALL_CORE
For information on Fall & Injury Prevention, visit: [email or web address] OR  [email or web address] OR  https://www.cdc.gov/steadi/patient.html
For information on Fall & Injury Prevention, visit: [email or web address] OR  [email or web address] OR  https://www.cdc.gov/steadi/patient.html

## 2024-05-22 NOTE — PROGRESS NOTE ADULT - PROBLEM SELECTOR PROBLEM 2
Preventive measure
none known

## 2024-05-22 NOTE — DISCHARGE NOTE NURSING/CASE MANAGEMENT/SOCIAL WORK - PATIENT PORTAL LINK FT
You can access the FollowMyHealth Patient Portal offered by [facility] by registering at the following website: [email or web address]. By joining [employer]’s FollowMyHealth portal, you will also be able to view your health information using other applications (apps) compatible with our system.
You can access the FollowMyHealth Patient Portal offered by [facility] by registering at the following website: [email or web address]. By joining [employer]’s FollowMyHealth portal, you will also be able to view your health information using other applications (apps) compatible with our system.

## 2024-05-22 NOTE — BH CONSULTATION LIAISON PROGRESS NOTE - NSBHFUPINTERVALCCFT_PSY_A_CORE
I still am going to be a president.
My father told me something that you will like to hear.
I am doing a little better.
I am doing fine, although the patient next to me had a transfusion. I am going to be a president.

## 2024-05-22 NOTE — SOCIAL WORK PROGRESS NOTE - NSSWPROGRESSNOTE_GEN_ALL_CORE
Patient to  be discharged to [facility] via [facility] at 4:00. Copy of chart to follow . Spoke with wife. She verbalized concerns 2PC. Referred her to Psych MD who spoke with her . Social Work to remain available.

## 2024-05-22 NOTE — BH CONSULTATION LIAISON PROGRESS NOTE - NSBHMSESPABN_PSY_A_CORE
Pressured rate/Increased productivity

## 2024-05-23 ENCOUNTER — APPOINTMENT (OUTPATIENT)
Dept: INTERNAL MEDICINE | Facility: CLINIC | Age: 69
End: 2024-05-23

## 2024-05-23 ENCOUNTER — NON-APPOINTMENT (OUTPATIENT)
Age: 69
End: 2024-05-23

## 2024-05-23 DIAGNOSIS — F03.90 UNSPECIFIED DEMENTIA, UNSPECIFIED SEVERITY, WITHOUT BEHAVIORAL DISTURBANCE, PSYCHOTIC DISTURBANCE, MOOD DISTURBANCE, AND ANXIETY: ICD-10-CM

## 2024-05-23 DIAGNOSIS — H91.90 UNSPECIFIED HEARING LOSS, UNSPECIFIED EAR: ICD-10-CM

## 2024-05-23 DIAGNOSIS — Z91.89 OTHER SPECIFIED PERSONAL RISK FACTORS, NOT ELSEWHERE CLASSIFIED: ICD-10-CM

## 2024-05-23 LAB
A1C WITH ESTIMATED AVERAGE GLUCOSE RESULT: 5.7 % — HIGH (ref 4–5.6)
CHOLEST SERPL-MCNC: 150 MG/DL — SIGNIFICANT CHANGE UP
ESTIMATED AVERAGE GLUCOSE: 117 MG/DL — HIGH (ref 68–114)
HDLC SERPL-MCNC: 47 MG/DL — SIGNIFICANT CHANGE UP
LIPID PNL WITH DIRECT LDL SERPL: 80 MG/DL — SIGNIFICANT CHANGE UP
NON HDL CHOLESTEROL: 103 MG/DL — SIGNIFICANT CHANGE UP
TRIGL SERPL-MCNC: 127 MG/DL — SIGNIFICANT CHANGE UP

## 2024-05-23 PROCEDURE — 99223 1ST HOSP IP/OBS HIGH 75: CPT

## 2024-05-23 RX ADMIN — Medication 200 MILLIGRAM(S): at 09:39

## 2024-05-23 RX ADMIN — Medication 325 MILLIGRAM(S): at 09:39

## 2024-05-23 RX ADMIN — LEVETIRACETAM 500 MILLIGRAM(S): 100 INJECTION INTRAVENOUS at 21:36

## 2024-05-23 RX ADMIN — HEPARIN SODIUM 5000 UNIT(S): 50 INJECTION, SOLUTION INTRAVENOUS at 21:36

## 2024-05-23 RX ADMIN — Medication 25 MICROGRAM(S): at 09:39

## 2024-05-23 RX ADMIN — LEVETIRACETAM 500 MILLIGRAM(S): 100 INJECTION INTRAVENOUS at 09:39

## 2024-05-23 RX ADMIN — HEPARIN SODIUM 5000 UNIT(S): 50 INJECTION, SOLUTION INTRAVENOUS at 06:50

## 2024-05-23 RX ADMIN — TAMSULOSIN HYDROCHLORIDE 0.4 MILLIGRAM(S): 0.4 CAPSULE ORAL at 21:36

## 2024-05-23 RX ADMIN — HEPARIN SODIUM 5000 UNIT(S): 50 INJECTION, SOLUTION INTRAVENOUS at 13:49

## 2024-05-23 RX ADMIN — Medication 1 TABLET(S): at 09:40

## 2024-05-23 RX ADMIN — Medication 5 MILLIGRAM(S): at 21:36

## 2024-05-24 PROCEDURE — 99254 IP/OBS CNSLTJ NEW/EST MOD 60: CPT

## 2024-05-24 PROCEDURE — 99232 SBSQ HOSP IP/OBS MODERATE 35: CPT

## 2024-05-24 RX ORDER — AMLODIPINE BESYLATE 2.5 MG/1
2.5 TABLET ORAL DAILY
Refills: 0 | Status: DISCONTINUED | OUTPATIENT
Start: 2024-05-24 | End: 2024-05-24

## 2024-05-24 RX ORDER — LEVETIRACETAM 100 MG/ML
250 INJECTION INTRAVENOUS
Refills: 0 | Status: DISCONTINUED | OUTPATIENT
Start: 2024-05-24 | End: 2024-05-28

## 2024-05-24 RX ADMIN — Medication 200 MILLIGRAM(S): at 10:02

## 2024-05-24 RX ADMIN — HEPARIN SODIUM 5000 UNIT(S): 50 INJECTION, SOLUTION INTRAVENOUS at 06:48

## 2024-05-24 RX ADMIN — LEVETIRACETAM 250 MILLIGRAM(S): 100 INJECTION INTRAVENOUS at 21:26

## 2024-05-24 RX ADMIN — TAMSULOSIN HYDROCHLORIDE 0.4 MILLIGRAM(S): 0.4 CAPSULE ORAL at 21:25

## 2024-05-24 RX ADMIN — LEVETIRACETAM 500 MILLIGRAM(S): 100 INJECTION INTRAVENOUS at 10:02

## 2024-05-24 RX ADMIN — Medication 5 MILLIGRAM(S): at 21:25

## 2024-05-24 RX ADMIN — Medication 1 TABLET(S): at 10:02

## 2024-05-24 RX ADMIN — Medication 25 MICROGRAM(S): at 10:03

## 2024-05-24 RX ADMIN — Medication 325 MILLIGRAM(S): at 10:03

## 2024-05-25 PROCEDURE — 99232 SBSQ HOSP IP/OBS MODERATE 35: CPT

## 2024-05-25 RX ADMIN — Medication 5 MILLIGRAM(S): at 21:15

## 2024-05-25 RX ADMIN — LEVETIRACETAM 250 MILLIGRAM(S): 100 INJECTION INTRAVENOUS at 21:15

## 2024-05-25 RX ADMIN — Medication 325 MILLIGRAM(S): at 10:12

## 2024-05-25 RX ADMIN — Medication 200 MILLIGRAM(S): at 10:11

## 2024-05-25 RX ADMIN — Medication 1 TABLET(S): at 10:11

## 2024-05-25 RX ADMIN — TAMSULOSIN HYDROCHLORIDE 0.4 MILLIGRAM(S): 0.4 CAPSULE ORAL at 21:14

## 2024-05-25 RX ADMIN — Medication 25 MICROGRAM(S): at 10:11

## 2024-05-25 RX ADMIN — LEVETIRACETAM 250 MILLIGRAM(S): 100 INJECTION INTRAVENOUS at 14:57

## 2024-05-26 PROCEDURE — 99232 SBSQ HOSP IP/OBS MODERATE 35: CPT

## 2024-05-26 PROCEDURE — 99231 SBSQ HOSP IP/OBS SF/LOW 25: CPT

## 2024-05-26 RX ADMIN — Medication 300 MILLIGRAM(S): at 23:06

## 2024-05-26 RX ADMIN — Medication 5 MILLIGRAM(S): at 23:06

## 2024-05-26 RX ADMIN — Medication 200 MILLIGRAM(S): at 09:19

## 2024-05-26 RX ADMIN — LEVETIRACETAM 250 MILLIGRAM(S): 100 INJECTION INTRAVENOUS at 09:52

## 2024-05-26 RX ADMIN — LEVETIRACETAM 250 MILLIGRAM(S): 100 INJECTION INTRAVENOUS at 23:06

## 2024-05-26 RX ADMIN — Medication 1 TABLET(S): at 09:19

## 2024-05-26 RX ADMIN — Medication 25 MICROGRAM(S): at 07:31

## 2024-05-26 RX ADMIN — TAMSULOSIN HYDROCHLORIDE 0.4 MILLIGRAM(S): 0.4 CAPSULE ORAL at 23:06

## 2024-05-26 RX ADMIN — Medication 325 MILLIGRAM(S): at 09:19

## 2024-05-27 PROCEDURE — 99231 SBSQ HOSP IP/OBS SF/LOW 25: CPT

## 2024-05-27 RX ADMIN — Medication 300 MILLIGRAM(S): at 21:58

## 2024-05-27 RX ADMIN — Medication 325 MILLIGRAM(S): at 09:58

## 2024-05-27 RX ADMIN — LEVETIRACETAM 250 MILLIGRAM(S): 100 INJECTION INTRAVENOUS at 21:59

## 2024-05-27 RX ADMIN — Medication 25 MICROGRAM(S): at 09:58

## 2024-05-27 RX ADMIN — Medication 1 TABLET(S): at 09:58

## 2024-05-27 RX ADMIN — Medication 5 MILLIGRAM(S): at 21:58

## 2024-05-27 RX ADMIN — LEVETIRACETAM 250 MILLIGRAM(S): 100 INJECTION INTRAVENOUS at 09:58

## 2024-05-27 RX ADMIN — OLANZAPINE 2.5 MILLIGRAM(S): 2.5 TABLET, FILM COATED ORAL at 04:26

## 2024-05-27 RX ADMIN — TAMSULOSIN HYDROCHLORIDE 0.4 MILLIGRAM(S): 0.4 CAPSULE ORAL at 21:59

## 2024-05-28 PROCEDURE — 99232 SBSQ HOSP IP/OBS MODERATE 35: CPT

## 2024-05-28 RX ORDER — LEVETIRACETAM 100 MG/ML
250 INJECTION INTRAVENOUS DAILY
Refills: 0 | Status: COMPLETED | OUTPATIENT
Start: 2024-05-29 | End: 2024-05-29

## 2024-05-28 RX ADMIN — Medication 25 MICROGRAM(S): at 11:06

## 2024-05-28 RX ADMIN — TAMSULOSIN HYDROCHLORIDE 0.4 MILLIGRAM(S): 0.4 CAPSULE ORAL at 21:55

## 2024-05-28 RX ADMIN — Medication 1 TABLET(S): at 11:05

## 2024-05-28 RX ADMIN — Medication 200 MILLIGRAM(S): at 21:56

## 2024-05-28 RX ADMIN — LEVETIRACETAM 250 MILLIGRAM(S): 100 INJECTION INTRAVENOUS at 11:05

## 2024-05-28 RX ADMIN — Medication 325 MILLIGRAM(S): at 11:05

## 2024-05-28 RX ADMIN — Medication 5 MILLIGRAM(S): at 21:55

## 2024-05-29 PROCEDURE — 99232 SBSQ HOSP IP/OBS MODERATE 35: CPT

## 2024-05-29 RX ORDER — VALPROIC ACID 250 MG/5ML
250 SOLUTION ORAL
Refills: 0 | Status: DISCONTINUED | OUTPATIENT
Start: 2024-05-29 | End: 2024-05-31

## 2024-05-29 RX ADMIN — LEVETIRACETAM 250 MILLIGRAM(S): 100 INJECTION INTRAVENOUS at 10:35

## 2024-05-29 RX ADMIN — VALPROIC ACID 250 MILLIGRAM(S): 250 SOLUTION ORAL at 21:30

## 2024-05-29 RX ADMIN — Medication 1 TABLET(S): at 10:36

## 2024-05-29 RX ADMIN — Medication 325 MILLIGRAM(S): at 10:36

## 2024-05-29 RX ADMIN — Medication 25 MICROGRAM(S): at 07:12

## 2024-05-29 RX ADMIN — Medication 5 MILLIGRAM(S): at 21:30

## 2024-05-29 RX ADMIN — Medication 200 MILLIGRAM(S): at 21:30

## 2024-05-29 RX ADMIN — TAMSULOSIN HYDROCHLORIDE 0.4 MILLIGRAM(S): 0.4 CAPSULE ORAL at 21:30

## 2024-05-29 RX ADMIN — OLANZAPINE 2.5 MILLIGRAM(S): 2.5 TABLET, FILM COATED ORAL at 10:35

## 2024-05-30 PROCEDURE — 99232 SBSQ HOSP IP/OBS MODERATE 35: CPT

## 2024-05-30 RX ADMIN — Medication 325 MILLIGRAM(S): at 10:37

## 2024-05-30 RX ADMIN — VALPROIC ACID 250 MILLIGRAM(S): 250 SOLUTION ORAL at 21:45

## 2024-05-30 RX ADMIN — Medication 5 MILLIGRAM(S): at 21:45

## 2024-05-30 RX ADMIN — TAMSULOSIN HYDROCHLORIDE 0.4 MILLIGRAM(S): 0.4 CAPSULE ORAL at 21:45

## 2024-05-30 RX ADMIN — VALPROIC ACID 250 MILLIGRAM(S): 250 SOLUTION ORAL at 10:37

## 2024-05-30 RX ADMIN — Medication 1 TABLET(S): at 10:37

## 2024-05-30 RX ADMIN — Medication 25 MICROGRAM(S): at 07:00

## 2024-05-30 RX ADMIN — OLANZAPINE 2.5 MILLIGRAM(S): 2.5 TABLET, FILM COATED ORAL at 01:33

## 2024-05-30 RX ADMIN — Medication 200 MILLIGRAM(S): at 21:45

## 2024-05-31 PROCEDURE — 99232 SBSQ HOSP IP/OBS MODERATE 35: CPT

## 2024-05-31 RX ORDER — VALPROIC ACID 250 MG/5ML
250 SOLUTION ORAL EVERY 24 HOURS
Refills: 0 | Status: DISCONTINUED | OUTPATIENT
Start: 2024-06-01 | End: 2024-06-02

## 2024-05-31 RX ORDER — VALPROIC ACID 250 MG/5ML
500 SOLUTION ORAL AT BEDTIME
Refills: 0 | Status: DISCONTINUED | OUTPATIENT
Start: 2024-05-31 | End: 2024-06-02

## 2024-05-31 RX ADMIN — OLANZAPINE 2.5 MILLIGRAM(S): 2.5 TABLET, FILM COATED ORAL at 21:45

## 2024-05-31 RX ADMIN — Medication 5 MILLIGRAM(S): at 21:45

## 2024-05-31 RX ADMIN — Medication 25 MICROGRAM(S): at 06:31

## 2024-05-31 RX ADMIN — VALPROIC ACID 500 MILLIGRAM(S): 250 SOLUTION ORAL at 21:55

## 2024-05-31 RX ADMIN — VALPROIC ACID 250 MILLIGRAM(S): 250 SOLUTION ORAL at 10:03

## 2024-05-31 RX ADMIN — TAMSULOSIN HYDROCHLORIDE 0.4 MILLIGRAM(S): 0.4 CAPSULE ORAL at 21:45

## 2024-05-31 RX ADMIN — Medication 325 MILLIGRAM(S): at 10:02

## 2024-05-31 RX ADMIN — Medication 1 TABLET(S): at 10:02

## 2024-05-31 RX ADMIN — Medication 200 MILLIGRAM(S): at 21:46

## 2024-06-01 PROCEDURE — 99231 SBSQ HOSP IP/OBS SF/LOW 25: CPT

## 2024-06-01 RX ORDER — OLANZAPINE 2.5 MG/1
2.5 TABLET, FILM COATED ORAL ONCE
Refills: 0 | Status: COMPLETED | OUTPATIENT
Start: 2024-06-01 | End: 2024-06-01

## 2024-06-01 RX ADMIN — TAMSULOSIN HYDROCHLORIDE 0.4 MILLIGRAM(S): 0.4 CAPSULE ORAL at 21:42

## 2024-06-01 RX ADMIN — VALPROIC ACID 500 MILLIGRAM(S): 250 SOLUTION ORAL at 21:42

## 2024-06-01 RX ADMIN — Medication 25 MICROGRAM(S): at 06:29

## 2024-06-01 RX ADMIN — Medication 325 MILLIGRAM(S): at 10:25

## 2024-06-01 RX ADMIN — Medication 5 MILLIGRAM(S): at 21:42

## 2024-06-01 RX ADMIN — Medication 200 MILLIGRAM(S): at 21:43

## 2024-06-01 RX ADMIN — OLANZAPINE 2.5 MILLIGRAM(S): 2.5 TABLET, FILM COATED ORAL at 00:21

## 2024-06-01 RX ADMIN — VALPROIC ACID 250 MILLIGRAM(S): 250 SOLUTION ORAL at 10:26

## 2024-06-01 RX ADMIN — Medication 1 TABLET(S): at 10:25

## 2024-06-02 PROCEDURE — 99231 SBSQ HOSP IP/OBS SF/LOW 25: CPT

## 2024-06-02 RX ORDER — VALPROIC ACID 250 MG/5ML
500 SOLUTION ORAL
Refills: 0 | Status: DISCONTINUED | OUTPATIENT
Start: 2024-06-02 | End: 2024-06-14

## 2024-06-02 RX ADMIN — TAMSULOSIN HYDROCHLORIDE 0.4 MILLIGRAM(S): 0.4 CAPSULE ORAL at 21:16

## 2024-06-02 RX ADMIN — VALPROIC ACID 250 MILLIGRAM(S): 250 SOLUTION ORAL at 09:29

## 2024-06-02 RX ADMIN — Medication 5 MILLIGRAM(S): at 21:17

## 2024-06-02 RX ADMIN — Medication 200 MILLIGRAM(S): at 21:16

## 2024-06-02 RX ADMIN — Medication 1 TABLET(S): at 09:29

## 2024-06-02 RX ADMIN — Medication 25 MICROGRAM(S): at 06:40

## 2024-06-02 RX ADMIN — Medication 325 MILLIGRAM(S): at 09:30

## 2024-06-02 RX ADMIN — VALPROIC ACID 500 MILLIGRAM(S): 250 SOLUTION ORAL at 21:16

## 2024-06-03 PROCEDURE — 99232 SBSQ HOSP IP/OBS MODERATE 35: CPT

## 2024-06-03 RX ADMIN — Medication 5 MILLIGRAM(S): at 22:05

## 2024-06-03 RX ADMIN — Medication 25 MICROGRAM(S): at 06:34

## 2024-06-03 RX ADMIN — TAMSULOSIN HYDROCHLORIDE 0.4 MILLIGRAM(S): 0.4 CAPSULE ORAL at 22:05

## 2024-06-03 RX ADMIN — Medication 200 MILLIGRAM(S): at 22:05

## 2024-06-03 RX ADMIN — Medication 325 MILLIGRAM(S): at 10:24

## 2024-06-03 RX ADMIN — VALPROIC ACID 500 MILLIGRAM(S): 250 SOLUTION ORAL at 10:24

## 2024-06-03 RX ADMIN — VALPROIC ACID 500 MILLIGRAM(S): 250 SOLUTION ORAL at 22:05

## 2024-06-03 RX ADMIN — Medication 1 TABLET(S): at 10:24

## 2024-06-04 LAB
ALBUMIN SERPL ELPH-MCNC: 4.4 G/DL — SIGNIFICANT CHANGE UP (ref 3.3–5)
ALP SERPL-CCNC: 99 U/L — SIGNIFICANT CHANGE UP (ref 40–120)
ALT FLD-CCNC: 17 U/L — SIGNIFICANT CHANGE UP (ref 10–45)
ANION GAP SERPL CALC-SCNC: 11 MMOL/L — SIGNIFICANT CHANGE UP (ref 5–17)
AST SERPL-CCNC: 20 U/L — SIGNIFICANT CHANGE UP (ref 10–40)
BILIRUB SERPL-MCNC: 0.3 MG/DL — SIGNIFICANT CHANGE UP (ref 0.2–1.2)
BUN SERPL-MCNC: 23 MG/DL — SIGNIFICANT CHANGE UP (ref 7–23)
CALCIUM SERPL-MCNC: 9.6 MG/DL — SIGNIFICANT CHANGE UP (ref 8.4–10.5)
CHLORIDE SERPL-SCNC: 105 MMOL/L — SIGNIFICANT CHANGE UP (ref 96–108)
CO2 SERPL-SCNC: 24 MMOL/L — SIGNIFICANT CHANGE UP (ref 22–31)
CREAT SERPL-MCNC: 1.33 MG/DL — HIGH (ref 0.5–1.3)
EGFR: 58 ML/MIN/1.73M2 — LOW
GLUCOSE SERPL-MCNC: 92 MG/DL — SIGNIFICANT CHANGE UP (ref 70–99)
POTASSIUM SERPL-MCNC: 4 MMOL/L — SIGNIFICANT CHANGE UP (ref 3.5–5.3)
POTASSIUM SERPL-SCNC: 4 MMOL/L — SIGNIFICANT CHANGE UP (ref 3.5–5.3)
PROT SERPL-MCNC: 7.1 G/DL — SIGNIFICANT CHANGE UP (ref 6–8.3)
SODIUM SERPL-SCNC: 140 MMOL/L — SIGNIFICANT CHANGE UP (ref 135–145)
VALPROATE SERPL-MCNC: 59.6 UG/ML — SIGNIFICANT CHANGE UP (ref 50–100)

## 2024-06-04 PROCEDURE — 99232 SBSQ HOSP IP/OBS MODERATE 35: CPT

## 2024-06-04 RX ORDER — RISPERIDONE 0.5 MG/1
0.5 TABLET ORAL
Refills: 0 | Status: DISCONTINUED | OUTPATIENT
Start: 2024-06-04 | End: 2024-06-05

## 2024-06-04 RX ADMIN — Medication 25 MICROGRAM(S): at 07:27

## 2024-06-04 RX ADMIN — Medication 1 TABLET(S): at 09:37

## 2024-06-04 RX ADMIN — VALPROIC ACID 500 MILLIGRAM(S): 250 SOLUTION ORAL at 21:32

## 2024-06-04 RX ADMIN — Medication 150 MILLIGRAM(S): at 21:32

## 2024-06-04 RX ADMIN — OLANZAPINE 2.5 MILLIGRAM(S): 2.5 TABLET, FILM COATED ORAL at 11:31

## 2024-06-04 RX ADMIN — Medication 5 MILLIGRAM(S): at 21:32

## 2024-06-04 RX ADMIN — Medication 325 MILLIGRAM(S): at 09:37

## 2024-06-04 RX ADMIN — TAMSULOSIN HYDROCHLORIDE 0.4 MILLIGRAM(S): 0.4 CAPSULE ORAL at 21:32

## 2024-06-04 RX ADMIN — RISPERIDONE 0.5 MILLIGRAM(S): 0.5 TABLET ORAL at 15:57

## 2024-06-04 RX ADMIN — VALPROIC ACID 500 MILLIGRAM(S): 250 SOLUTION ORAL at 09:37

## 2024-06-05 RX ORDER — RISPERIDONE 0.5 MG/1
1 TABLET ORAL
Refills: 0 | Status: DISCONTINUED | OUTPATIENT
Start: 2024-06-05 | End: 2024-06-10

## 2024-06-05 RX ADMIN — VALPROIC ACID 500 MILLIGRAM(S): 250 SOLUTION ORAL at 10:09

## 2024-06-05 RX ADMIN — Medication 5 MILLIGRAM(S): at 21:23

## 2024-06-05 RX ADMIN — RISPERIDONE 0.5 MILLIGRAM(S): 0.5 TABLET ORAL at 13:18

## 2024-06-05 RX ADMIN — Medication 1 TABLET(S): at 10:10

## 2024-06-05 RX ADMIN — Medication 325 MILLIGRAM(S): at 10:10

## 2024-06-05 RX ADMIN — VALPROIC ACID 500 MILLIGRAM(S): 250 SOLUTION ORAL at 21:23

## 2024-06-05 RX ADMIN — Medication 25 MICROGRAM(S): at 07:07

## 2024-06-05 RX ADMIN — Medication 100 MILLIGRAM(S): at 21:23

## 2024-06-05 RX ADMIN — RISPERIDONE 1 MILLIGRAM(S): 0.5 TABLET ORAL at 21:23

## 2024-06-05 RX ADMIN — TAMSULOSIN HYDROCHLORIDE 0.4 MILLIGRAM(S): 0.4 CAPSULE ORAL at 21:23

## 2024-06-06 PROCEDURE — 99232 SBSQ HOSP IP/OBS MODERATE 35: CPT

## 2024-06-06 RX ADMIN — VALPROIC ACID 500 MILLIGRAM(S): 250 SOLUTION ORAL at 22:02

## 2024-06-06 RX ADMIN — Medication 325 MILLIGRAM(S): at 10:12

## 2024-06-06 RX ADMIN — RISPERIDONE 1 MILLIGRAM(S): 0.5 TABLET ORAL at 12:28

## 2024-06-06 RX ADMIN — Medication 25 MICROGRAM(S): at 06:16

## 2024-06-06 RX ADMIN — VALPROIC ACID 500 MILLIGRAM(S): 250 SOLUTION ORAL at 10:11

## 2024-06-06 RX ADMIN — Medication 1 TABLET(S): at 10:12

## 2024-06-06 RX ADMIN — Medication 100 MILLIGRAM(S): at 22:02

## 2024-06-06 RX ADMIN — Medication 5 MILLIGRAM(S): at 22:02

## 2024-06-06 RX ADMIN — TAMSULOSIN HYDROCHLORIDE 0.4 MILLIGRAM(S): 0.4 CAPSULE ORAL at 22:02

## 2024-06-07 PROCEDURE — 99232 SBSQ HOSP IP/OBS MODERATE 35: CPT

## 2024-06-07 RX ADMIN — VALPROIC ACID 500 MILLIGRAM(S): 250 SOLUTION ORAL at 22:00

## 2024-06-07 RX ADMIN — TAMSULOSIN HYDROCHLORIDE 0.4 MILLIGRAM(S): 0.4 CAPSULE ORAL at 22:00

## 2024-06-07 RX ADMIN — Medication 1 TABLET(S): at 10:33

## 2024-06-07 RX ADMIN — Medication 5 MILLIGRAM(S): at 22:00

## 2024-06-07 RX ADMIN — Medication 100 MILLIGRAM(S): at 22:00

## 2024-06-07 RX ADMIN — Medication 325 MILLIGRAM(S): at 10:33

## 2024-06-07 RX ADMIN — Medication 25 MICROGRAM(S): at 06:49

## 2024-06-07 RX ADMIN — VALPROIC ACID 500 MILLIGRAM(S): 250 SOLUTION ORAL at 10:33

## 2024-06-07 RX ADMIN — RISPERIDONE 1 MILLIGRAM(S): 0.5 TABLET ORAL at 13:16

## 2024-06-08 RX ADMIN — TAMSULOSIN HYDROCHLORIDE 0.4 MILLIGRAM(S): 0.4 CAPSULE ORAL at 21:41

## 2024-06-08 RX ADMIN — Medication 325 MILLIGRAM(S): at 09:39

## 2024-06-08 RX ADMIN — VALPROIC ACID 500 MILLIGRAM(S): 250 SOLUTION ORAL at 21:42

## 2024-06-08 RX ADMIN — Medication 1 TABLET(S): at 09:38

## 2024-06-08 RX ADMIN — OLANZAPINE 2.5 MILLIGRAM(S): 2.5 TABLET, FILM COATED ORAL at 21:42

## 2024-06-08 RX ADMIN — VALPROIC ACID 500 MILLIGRAM(S): 250 SOLUTION ORAL at 09:38

## 2024-06-08 RX ADMIN — RISPERIDONE 1 MILLIGRAM(S): 0.5 TABLET ORAL at 13:10

## 2024-06-08 RX ADMIN — Medication 100 MILLIGRAM(S): at 21:42

## 2024-06-08 RX ADMIN — Medication 5 MILLIGRAM(S): at 21:41

## 2024-06-08 RX ADMIN — Medication 25 MICROGRAM(S): at 06:42

## 2024-06-09 PROCEDURE — 99231 SBSQ HOSP IP/OBS SF/LOW 25: CPT

## 2024-06-09 RX ORDER — AMLODIPINE BESYLATE 2.5 MG/1
5 TABLET ORAL ONCE
Refills: 0 | Status: COMPLETED | OUTPATIENT
Start: 2024-06-09 | End: 2024-06-09

## 2024-06-09 RX ORDER — AMLODIPINE BESYLATE 2.5 MG/1
5 TABLET ORAL DAILY
Refills: 0 | Status: DISCONTINUED | OUTPATIENT
Start: 2024-06-09 | End: 2024-06-28

## 2024-06-09 RX ADMIN — Medication 100 MILLIGRAM(S): at 21:19

## 2024-06-09 RX ADMIN — Medication 1 TABLET(S): at 10:04

## 2024-06-09 RX ADMIN — TAMSULOSIN HYDROCHLORIDE 0.4 MILLIGRAM(S): 0.4 CAPSULE ORAL at 21:19

## 2024-06-09 RX ADMIN — Medication 25 MICROGRAM(S): at 06:48

## 2024-06-09 RX ADMIN — AMLODIPINE BESYLATE 5 MILLIGRAM(S): 2.5 TABLET ORAL at 21:19

## 2024-06-09 RX ADMIN — RISPERIDONE 1 MILLIGRAM(S): 0.5 TABLET ORAL at 12:30

## 2024-06-09 RX ADMIN — VALPROIC ACID 500 MILLIGRAM(S): 250 SOLUTION ORAL at 10:04

## 2024-06-09 RX ADMIN — Medication 5 MILLIGRAM(S): at 21:19

## 2024-06-09 RX ADMIN — Medication 325 MILLIGRAM(S): at 10:04

## 2024-06-09 RX ADMIN — VALPROIC ACID 500 MILLIGRAM(S): 250 SOLUTION ORAL at 21:19

## 2024-06-10 PROCEDURE — 99232 SBSQ HOSP IP/OBS MODERATE 35: CPT

## 2024-06-10 RX ORDER — RISPERIDONE 0.5 MG/1
1.5 TABLET ORAL
Refills: 0 | Status: DISCONTINUED | OUTPATIENT
Start: 2024-06-10 | End: 2024-06-11

## 2024-06-10 RX ADMIN — AMLODIPINE BESYLATE 5 MILLIGRAM(S): 2.5 TABLET ORAL at 06:40

## 2024-06-10 RX ADMIN — Medication 1 TABLET(S): at 10:09

## 2024-06-10 RX ADMIN — Medication 325 MILLIGRAM(S): at 10:09

## 2024-06-10 RX ADMIN — VALPROIC ACID 500 MILLIGRAM(S): 250 SOLUTION ORAL at 21:53

## 2024-06-10 RX ADMIN — Medication 50 MILLIGRAM(S): at 21:53

## 2024-06-10 RX ADMIN — Medication 25 MICROGRAM(S): at 06:32

## 2024-06-10 RX ADMIN — TAMSULOSIN HYDROCHLORIDE 0.4 MILLIGRAM(S): 0.4 CAPSULE ORAL at 21:53

## 2024-06-10 RX ADMIN — Medication 5 MILLIGRAM(S): at 21:54

## 2024-06-10 RX ADMIN — RISPERIDONE 1.5 MILLIGRAM(S): 0.5 TABLET ORAL at 12:59

## 2024-06-10 RX ADMIN — VALPROIC ACID 500 MILLIGRAM(S): 250 SOLUTION ORAL at 10:09

## 2024-06-11 PROCEDURE — 99232 SBSQ HOSP IP/OBS MODERATE 35: CPT

## 2024-06-11 RX ORDER — RISPERIDONE 0.5 MG/1
2 TABLET ORAL AT BEDTIME
Refills: 0 | Status: DISCONTINUED | OUTPATIENT
Start: 2024-06-11 | End: 2024-06-18

## 2024-06-11 RX ADMIN — VALPROIC ACID 500 MILLIGRAM(S): 250 SOLUTION ORAL at 21:20

## 2024-06-11 RX ADMIN — Medication 1 TABLET(S): at 10:04

## 2024-06-11 RX ADMIN — Medication 25 MICROGRAM(S): at 06:58

## 2024-06-11 RX ADMIN — RISPERIDONE 2 MILLIGRAM(S): 0.5 TABLET ORAL at 21:20

## 2024-06-11 RX ADMIN — Medication 5 MILLIGRAM(S): at 21:20

## 2024-06-11 RX ADMIN — AMLODIPINE BESYLATE 5 MILLIGRAM(S): 2.5 TABLET ORAL at 10:04

## 2024-06-11 RX ADMIN — VALPROIC ACID 500 MILLIGRAM(S): 250 SOLUTION ORAL at 10:04

## 2024-06-11 RX ADMIN — TAMSULOSIN HYDROCHLORIDE 0.4 MILLIGRAM(S): 0.4 CAPSULE ORAL at 21:20

## 2024-06-11 RX ADMIN — Medication 325 MILLIGRAM(S): at 10:04

## 2024-06-12 PROCEDURE — 99231 SBSQ HOSP IP/OBS SF/LOW 25: CPT

## 2024-06-12 RX ADMIN — TAMSULOSIN HYDROCHLORIDE 0.4 MILLIGRAM(S): 0.4 CAPSULE ORAL at 21:01

## 2024-06-12 RX ADMIN — AMLODIPINE BESYLATE 5 MILLIGRAM(S): 2.5 TABLET ORAL at 10:11

## 2024-06-12 RX ADMIN — Medication 1 TABLET(S): at 10:11

## 2024-06-12 RX ADMIN — RISPERIDONE 2 MILLIGRAM(S): 0.5 TABLET ORAL at 21:02

## 2024-06-12 RX ADMIN — Medication 25 MICROGRAM(S): at 06:57

## 2024-06-12 RX ADMIN — VALPROIC ACID 500 MILLIGRAM(S): 250 SOLUTION ORAL at 10:11

## 2024-06-12 RX ADMIN — Medication 325 MILLIGRAM(S): at 10:10

## 2024-06-12 RX ADMIN — Medication 5 MILLIGRAM(S): at 21:02

## 2024-06-12 RX ADMIN — VALPROIC ACID 500 MILLIGRAM(S): 250 SOLUTION ORAL at 21:02

## 2024-06-13 PROCEDURE — 99232 SBSQ HOSP IP/OBS MODERATE 35: CPT

## 2024-06-13 RX ADMIN — Medication 325 MILLIGRAM(S): at 09:52

## 2024-06-13 RX ADMIN — RISPERIDONE 2 MILLIGRAM(S): 0.5 TABLET ORAL at 21:18

## 2024-06-13 RX ADMIN — VALPROIC ACID 500 MILLIGRAM(S): 250 SOLUTION ORAL at 21:19

## 2024-06-13 RX ADMIN — Medication 5 MILLIGRAM(S): at 21:18

## 2024-06-13 RX ADMIN — Medication 1 TABLET(S): at 09:53

## 2024-06-13 RX ADMIN — Medication 25 MICROGRAM(S): at 06:31

## 2024-06-13 RX ADMIN — VALPROIC ACID 500 MILLIGRAM(S): 250 SOLUTION ORAL at 09:52

## 2024-06-13 RX ADMIN — TAMSULOSIN HYDROCHLORIDE 0.4 MILLIGRAM(S): 0.4 CAPSULE ORAL at 21:18

## 2024-06-13 RX ADMIN — AMLODIPINE BESYLATE 5 MILLIGRAM(S): 2.5 TABLET ORAL at 09:52

## 2024-06-14 PROCEDURE — 99231 SBSQ HOSP IP/OBS SF/LOW 25: CPT

## 2024-06-14 RX ORDER — VALPROIC ACID 250 MG/5ML
750 SOLUTION ORAL
Refills: 0 | Status: DISCONTINUED | OUTPATIENT
Start: 2024-06-14 | End: 2024-06-28

## 2024-06-14 RX ORDER — VALPROIC ACID 250 MG/5ML
500 SOLUTION ORAL
Refills: 0 | Status: DISCONTINUED | OUTPATIENT
Start: 2024-06-14 | End: 2024-06-14

## 2024-06-14 RX ADMIN — Medication 1 TABLET(S): at 10:22

## 2024-06-14 RX ADMIN — Medication 25 MICROGRAM(S): at 06:10

## 2024-06-14 RX ADMIN — Medication 325 MILLIGRAM(S): at 10:22

## 2024-06-14 RX ADMIN — Medication 5 MILLIGRAM(S): at 21:05

## 2024-06-14 RX ADMIN — AMLODIPINE BESYLATE 5 MILLIGRAM(S): 2.5 TABLET ORAL at 10:23

## 2024-06-14 RX ADMIN — TAMSULOSIN HYDROCHLORIDE 0.4 MILLIGRAM(S): 0.4 CAPSULE ORAL at 21:05

## 2024-06-14 RX ADMIN — VALPROIC ACID 500 MILLIGRAM(S): 250 SOLUTION ORAL at 10:23

## 2024-06-14 RX ADMIN — RISPERIDONE 2 MILLIGRAM(S): 0.5 TABLET ORAL at 21:05

## 2024-06-14 RX ADMIN — VALPROIC ACID 750 MILLIGRAM(S): 250 SOLUTION ORAL at 21:05

## 2024-06-15 RX ADMIN — TAMSULOSIN HYDROCHLORIDE 0.4 MILLIGRAM(S): 0.4 CAPSULE ORAL at 21:46

## 2024-06-15 RX ADMIN — VALPROIC ACID 750 MILLIGRAM(S): 250 SOLUTION ORAL at 21:46

## 2024-06-15 RX ADMIN — Medication 1 TABLET(S): at 10:37

## 2024-06-15 RX ADMIN — Medication 25 MICROGRAM(S): at 05:40

## 2024-06-15 RX ADMIN — RISPERIDONE 2 MILLIGRAM(S): 0.5 TABLET ORAL at 21:46

## 2024-06-15 RX ADMIN — VALPROIC ACID 750 MILLIGRAM(S): 250 SOLUTION ORAL at 10:37

## 2024-06-15 RX ADMIN — Medication 5 MILLIGRAM(S): at 21:46

## 2024-06-15 RX ADMIN — AMLODIPINE BESYLATE 5 MILLIGRAM(S): 2.5 TABLET ORAL at 10:38

## 2024-06-15 RX ADMIN — Medication 325 MILLIGRAM(S): at 10:38

## 2024-06-16 RX ADMIN — VALPROIC ACID 750 MILLIGRAM(S): 250 SOLUTION ORAL at 21:50

## 2024-06-16 RX ADMIN — RISPERIDONE 2 MILLIGRAM(S): 0.5 TABLET ORAL at 21:50

## 2024-06-16 RX ADMIN — Medication 325 MILLIGRAM(S): at 09:51

## 2024-06-16 RX ADMIN — TAMSULOSIN HYDROCHLORIDE 0.4 MILLIGRAM(S): 0.4 CAPSULE ORAL at 21:50

## 2024-06-16 RX ADMIN — AMLODIPINE BESYLATE 5 MILLIGRAM(S): 2.5 TABLET ORAL at 09:51

## 2024-06-16 RX ADMIN — Medication 5 MILLIGRAM(S): at 21:49

## 2024-06-16 RX ADMIN — Medication 1 TABLET(S): at 09:51

## 2024-06-16 RX ADMIN — Medication 25 MICROGRAM(S): at 05:47

## 2024-06-16 RX ADMIN — VALPROIC ACID 750 MILLIGRAM(S): 250 SOLUTION ORAL at 09:51

## 2024-06-17 PROCEDURE — 99231 SBSQ HOSP IP/OBS SF/LOW 25: CPT

## 2024-06-17 RX ADMIN — RISPERIDONE 2 MILLIGRAM(S): 0.5 TABLET ORAL at 21:41

## 2024-06-17 RX ADMIN — AMLODIPINE BESYLATE 5 MILLIGRAM(S): 2.5 TABLET ORAL at 10:30

## 2024-06-17 RX ADMIN — Medication 25 MICROGRAM(S): at 07:20

## 2024-06-17 RX ADMIN — Medication 1 TABLET(S): at 10:29

## 2024-06-17 RX ADMIN — VALPROIC ACID 750 MILLIGRAM(S): 250 SOLUTION ORAL at 10:29

## 2024-06-17 RX ADMIN — Medication 5 MILLIGRAM(S): at 21:41

## 2024-06-17 RX ADMIN — VALPROIC ACID 750 MILLIGRAM(S): 250 SOLUTION ORAL at 21:41

## 2024-06-17 RX ADMIN — TAMSULOSIN HYDROCHLORIDE 0.4 MILLIGRAM(S): 0.4 CAPSULE ORAL at 21:42

## 2024-06-17 RX ADMIN — Medication 325 MILLIGRAM(S): at 10:29

## 2024-06-18 PROCEDURE — 99233 SBSQ HOSP IP/OBS HIGH 50: CPT

## 2024-06-18 RX ORDER — RISPERIDONE 0.5 MG/1
1 TABLET ORAL AT BEDTIME
Refills: 0 | Status: DISCONTINUED | OUTPATIENT
Start: 2024-06-18 | End: 2024-06-21

## 2024-06-18 RX ADMIN — Medication 325 MILLIGRAM(S): at 10:03

## 2024-06-18 RX ADMIN — VALPROIC ACID 750 MILLIGRAM(S): 250 SOLUTION ORAL at 10:03

## 2024-06-18 RX ADMIN — Medication 25 MICROGRAM(S): at 06:08

## 2024-06-18 RX ADMIN — Medication 5 MILLIGRAM(S): at 21:08

## 2024-06-18 RX ADMIN — VALPROIC ACID 750 MILLIGRAM(S): 250 SOLUTION ORAL at 21:08

## 2024-06-18 RX ADMIN — RISPERIDONE 1 MILLIGRAM(S): 0.5 TABLET ORAL at 21:08

## 2024-06-18 RX ADMIN — AMLODIPINE BESYLATE 5 MILLIGRAM(S): 2.5 TABLET ORAL at 10:03

## 2024-06-18 RX ADMIN — TAMSULOSIN HYDROCHLORIDE 0.4 MILLIGRAM(S): 0.4 CAPSULE ORAL at 21:08

## 2024-06-18 RX ADMIN — Medication 1 TABLET(S): at 10:03

## 2024-06-19 LAB
ALBUMIN SERPL ELPH-MCNC: 4.5 G/DL — SIGNIFICANT CHANGE UP (ref 3.3–5)
ALP SERPL-CCNC: 82 U/L — SIGNIFICANT CHANGE UP (ref 40–120)
ALT FLD-CCNC: 15 U/L — SIGNIFICANT CHANGE UP (ref 10–45)
ANION GAP SERPL CALC-SCNC: 9 MMOL/L — SIGNIFICANT CHANGE UP (ref 5–17)
AST SERPL-CCNC: 20 U/L — SIGNIFICANT CHANGE UP (ref 10–40)
BILIRUB SERPL-MCNC: 0.4 MG/DL — SIGNIFICANT CHANGE UP (ref 0.2–1.2)
BUN SERPL-MCNC: 19 MG/DL — SIGNIFICANT CHANGE UP (ref 7–23)
CALCIUM SERPL-MCNC: 10 MG/DL — SIGNIFICANT CHANGE UP (ref 8.4–10.5)
CHLORIDE SERPL-SCNC: 105 MMOL/L — SIGNIFICANT CHANGE UP (ref 96–108)
CO2 SERPL-SCNC: 27 MMOL/L — SIGNIFICANT CHANGE UP (ref 22–31)
CREAT SERPL-MCNC: 1.25 MG/DL — SIGNIFICANT CHANGE UP (ref 0.5–1.3)
EGFR: 62 ML/MIN/1.73M2 — SIGNIFICANT CHANGE UP
GLUCOSE SERPL-MCNC: 95 MG/DL — SIGNIFICANT CHANGE UP (ref 70–99)
HCT VFR BLD CALC: 42.7 % — SIGNIFICANT CHANGE UP (ref 39–50)
HGB BLD-MCNC: 13.5 G/DL — SIGNIFICANT CHANGE UP (ref 13–17)
MCHC RBC-ENTMCNC: 27.5 PG — SIGNIFICANT CHANGE UP (ref 27–34)
MCHC RBC-ENTMCNC: 31.6 GM/DL — LOW (ref 32–36)
MCV RBC AUTO: 87 FL — SIGNIFICANT CHANGE UP (ref 80–100)
NRBC # BLD: 0 /100 WBCS — SIGNIFICANT CHANGE UP (ref 0–0)
PLATELET # BLD AUTO: 185 K/UL — SIGNIFICANT CHANGE UP (ref 150–400)
POTASSIUM SERPL-MCNC: 4.8 MMOL/L — SIGNIFICANT CHANGE UP (ref 3.5–5.3)
POTASSIUM SERPL-SCNC: 4.8 MMOL/L — SIGNIFICANT CHANGE UP (ref 3.5–5.3)
PROT SERPL-MCNC: 7.4 G/DL — SIGNIFICANT CHANGE UP (ref 6–8.3)
RBC # BLD: 4.91 M/UL — SIGNIFICANT CHANGE UP (ref 4.2–5.8)
RBC # FLD: 18.9 % — HIGH (ref 10.3–14.5)
SODIUM SERPL-SCNC: 141 MMOL/L — SIGNIFICANT CHANGE UP (ref 135–145)
VALPROATE SERPL-MCNC: 82 UG/ML — SIGNIFICANT CHANGE UP (ref 50–100)
WBC # BLD: 7.28 K/UL — SIGNIFICANT CHANGE UP (ref 3.8–10.5)
WBC # FLD AUTO: 7.28 K/UL — SIGNIFICANT CHANGE UP (ref 3.8–10.5)

## 2024-06-19 PROCEDURE — 99231 SBSQ HOSP IP/OBS SF/LOW 25: CPT

## 2024-06-19 RX ADMIN — Medication 1 TABLET(S): at 10:21

## 2024-06-19 RX ADMIN — RISPERIDONE 1 MILLIGRAM(S): 0.5 TABLET ORAL at 21:41

## 2024-06-19 RX ADMIN — VALPROIC ACID 750 MILLIGRAM(S): 250 SOLUTION ORAL at 21:42

## 2024-06-19 RX ADMIN — Medication 25 MICROGRAM(S): at 05:07

## 2024-06-19 RX ADMIN — Medication 325 MILLIGRAM(S): at 10:22

## 2024-06-19 RX ADMIN — AMLODIPINE BESYLATE 5 MILLIGRAM(S): 2.5 TABLET ORAL at 10:22

## 2024-06-19 RX ADMIN — Medication 5 MILLIGRAM(S): at 21:41

## 2024-06-19 RX ADMIN — VALPROIC ACID 750 MILLIGRAM(S): 250 SOLUTION ORAL at 10:21

## 2024-06-19 RX ADMIN — TAMSULOSIN HYDROCHLORIDE 0.4 MILLIGRAM(S): 0.4 CAPSULE ORAL at 21:41

## 2024-06-20 PROCEDURE — 99231 SBSQ HOSP IP/OBS SF/LOW 25: CPT

## 2024-06-20 RX ADMIN — VALPROIC ACID 750 MILLIGRAM(S): 250 SOLUTION ORAL at 10:22

## 2024-06-20 RX ADMIN — Medication 1 TABLET(S): at 10:23

## 2024-06-20 RX ADMIN — Medication 5 MILLIGRAM(S): at 21:30

## 2024-06-20 RX ADMIN — Medication 325 MILLIGRAM(S): at 10:22

## 2024-06-20 RX ADMIN — Medication 25 MICROGRAM(S): at 05:57

## 2024-06-20 RX ADMIN — RISPERIDONE 1 MILLIGRAM(S): 0.5 TABLET ORAL at 21:30

## 2024-06-20 RX ADMIN — OLANZAPINE 2.5 MILLIGRAM(S): 2.5 TABLET, FILM COATED ORAL at 21:29

## 2024-06-20 RX ADMIN — VALPROIC ACID 750 MILLIGRAM(S): 250 SOLUTION ORAL at 21:29

## 2024-06-20 RX ADMIN — TAMSULOSIN HYDROCHLORIDE 0.4 MILLIGRAM(S): 0.4 CAPSULE ORAL at 21:29

## 2024-06-20 RX ADMIN — AMLODIPINE BESYLATE 5 MILLIGRAM(S): 2.5 TABLET ORAL at 10:23

## 2024-06-21 PROCEDURE — 99232 SBSQ HOSP IP/OBS MODERATE 35: CPT

## 2024-06-21 RX ORDER — RISPERIDONE 0.5 MG/1
1.5 TABLET ORAL AT BEDTIME
Refills: 0 | Status: DISCONTINUED | OUTPATIENT
Start: 2024-06-21 | End: 2024-06-28

## 2024-06-21 RX ADMIN — AMLODIPINE BESYLATE 5 MILLIGRAM(S): 2.5 TABLET ORAL at 09:52

## 2024-06-21 RX ADMIN — Medication 5 MILLIGRAM(S): at 21:14

## 2024-06-21 RX ADMIN — Medication 1 TABLET(S): at 09:51

## 2024-06-21 RX ADMIN — OLANZAPINE 2.5 MILLIGRAM(S): 2.5 TABLET, FILM COATED ORAL at 21:14

## 2024-06-21 RX ADMIN — Medication 325 MILLIGRAM(S): at 09:51

## 2024-06-21 RX ADMIN — VALPROIC ACID 750 MILLIGRAM(S): 250 SOLUTION ORAL at 21:13

## 2024-06-21 RX ADMIN — TAMSULOSIN HYDROCHLORIDE 0.4 MILLIGRAM(S): 0.4 CAPSULE ORAL at 21:14

## 2024-06-21 RX ADMIN — RISPERIDONE 1.5 MILLIGRAM(S): 0.5 TABLET ORAL at 21:14

## 2024-06-21 RX ADMIN — VALPROIC ACID 750 MILLIGRAM(S): 250 SOLUTION ORAL at 09:52

## 2024-06-21 RX ADMIN — Medication 25 MICROGRAM(S): at 06:31

## 2024-06-22 RX ADMIN — TAMSULOSIN HYDROCHLORIDE 0.4 MILLIGRAM(S): 0.4 CAPSULE ORAL at 21:37

## 2024-06-22 RX ADMIN — RISPERIDONE 1.5 MILLIGRAM(S): 0.5 TABLET ORAL at 21:37

## 2024-06-22 RX ADMIN — VALPROIC ACID 750 MILLIGRAM(S): 250 SOLUTION ORAL at 21:37

## 2024-06-22 RX ADMIN — VALPROIC ACID 750 MILLIGRAM(S): 250 SOLUTION ORAL at 09:18

## 2024-06-22 RX ADMIN — Medication 325 MILLIGRAM(S): at 09:18

## 2024-06-22 RX ADMIN — Medication 25 MICROGRAM(S): at 05:57

## 2024-06-22 RX ADMIN — Medication 1 TABLET(S): at 09:18

## 2024-06-22 RX ADMIN — Medication 5 MILLIGRAM(S): at 21:37

## 2024-06-22 RX ADMIN — AMLODIPINE BESYLATE 5 MILLIGRAM(S): 2.5 TABLET ORAL at 09:18

## 2024-06-23 RX ADMIN — Medication 1 TABLET(S): at 09:51

## 2024-06-23 RX ADMIN — Medication 25 MICROGRAM(S): at 05:56

## 2024-06-23 RX ADMIN — RISPERIDONE 1.5 MILLIGRAM(S): 0.5 TABLET ORAL at 22:17

## 2024-06-23 RX ADMIN — VALPROIC ACID 750 MILLIGRAM(S): 250 SOLUTION ORAL at 22:16

## 2024-06-23 RX ADMIN — TAMSULOSIN HYDROCHLORIDE 0.4 MILLIGRAM(S): 0.4 CAPSULE ORAL at 22:17

## 2024-06-23 RX ADMIN — Medication 325 MILLIGRAM(S): at 09:50

## 2024-06-23 RX ADMIN — AMLODIPINE BESYLATE 5 MILLIGRAM(S): 2.5 TABLET ORAL at 09:51

## 2024-06-23 RX ADMIN — Medication 5 MILLIGRAM(S): at 22:17

## 2024-06-23 RX ADMIN — VALPROIC ACID 750 MILLIGRAM(S): 250 SOLUTION ORAL at 09:51

## 2024-06-24 PROCEDURE — 99232 SBSQ HOSP IP/OBS MODERATE 35: CPT

## 2024-06-24 RX ADMIN — RISPERIDONE 1.5 MILLIGRAM(S): 0.5 TABLET ORAL at 21:11

## 2024-06-24 RX ADMIN — Medication 25 MICROGRAM(S): at 06:24

## 2024-06-24 RX ADMIN — Medication 325 MILLIGRAM(S): at 10:28

## 2024-06-24 RX ADMIN — VALPROIC ACID 750 MILLIGRAM(S): 250 SOLUTION ORAL at 21:11

## 2024-06-24 RX ADMIN — TAMSULOSIN HYDROCHLORIDE 0.4 MILLIGRAM(S): 0.4 CAPSULE ORAL at 21:11

## 2024-06-24 RX ADMIN — AMLODIPINE BESYLATE 5 MILLIGRAM(S): 2.5 TABLET ORAL at 10:28

## 2024-06-24 RX ADMIN — Medication 5 MILLIGRAM(S): at 21:11

## 2024-06-24 RX ADMIN — VALPROIC ACID 750 MILLIGRAM(S): 250 SOLUTION ORAL at 10:27

## 2024-06-24 RX ADMIN — Medication 1 TABLET(S): at 10:28

## 2024-06-24 NOTE — PROGRESS NOTE ADULT - PROBLEM SELECTOR PROBLEM 3
Bipolar illness
Nora Lopez is 15 month old female who presents for 15 month well child visit.     INTERVAL PROBLEMS: sleeps all night. 1 nap? walking well. Says over 15 words. Stools daily. + temper tantrums. No issues with 12 month vaccines    No current outpatient medications on file.     DIET: Finger foods    DEVELOPMENT:    - Walks alone  - Creeps up stairs  - Feeds self, uses cup  - Manages spoon  - Scribbles spontaneously  - Jargoning  - Several intelligible words, understands far more than he/she says  - Asks for object by pointing  - Increased stranger anxiety at this age  - Like to imitate household chores    REVIEW OF SYSTEMS:  GENERAL: no fevers  SKIN: no unusual skin lesions  HEENT: no nasal congestion  LUNGS: no coughing  GI: no constipation or diarrhea    EXAM:  Pulse 120   Temp 97.6 °F (36.4 °C) (Tympanic)   Resp 24   Ht 30.25\"   Wt 22 lb (9.979 kg)   HC 17.5\"   BMI 16.90 kg/m²   GENERAL: well developed, well nourished and in no apparent distress  SKIN: no rashes and no suspicious lesions  HEENT: atraumatic, normocephalic and ears and throat are clear  EYES: no strabismus  NECK: supple  LUNGS: clear to auscultation  CARDIO: RRR without murmur  GI: good BS's and no masses or HSM  : normal female  MUSCULOSKELETAL: good muscle tone, no wasting; no hip clicks, slight bowing of lower legs. Feet show no metatarus adductus.  EXTREMITIES: no deformity, no swelling  NEURO: good tone, moves all four extremities well, follows objects to the midline with eyes    ASSESSMENT AND PLAN:  Nora Lopez is 15 month old female who is here for the 15 month visit.     1. Encounter for routine child health examination without abnormal findings  - anticipatory care discussed  - diet  - sleep  - safety  - discipline    2. Need for vaccination  - vaccines due discussed  - Immunization Admin Counseling, 1st Component, <18 years  - Immunization Admin Counseling, Additional Component, <18 years  - DTap (Infanrix) Vaccine (< 7 Y)  - HIB 
immunization (ACTHIB) 4 dose (reconstituted vaccine)      The following issues discussed with parents:     DIET: Use the cup when ever possible ,should wean bottle by age 18 months. Encourage spoon usage, although messy. Avoid small potentially choking foods. Child's appetite will appear decreased, less growing and is more interested in other things. Will have good days eating and bad days. Will prefer finger foods at this time. Use table food cut into small pieces. Appetite may appear decreased as activity is increasing.     DEVELOPMENT: Should be walking now. 4-10 word vocabulary. Understands more than he/she says. Watch climbing. Temper tantrums and limit testing. Minimize discipline, child is exploring and limit testing. Don't overuse NO.  Redirection is best stratedy for behavioral modification.    SAFETY: Use car seat at all times, can now face forward if > 20 lbs. Supervise child at all times maria dolores if walking, can get into a lot of trouble. Keep syrup of Ipecac and poison control number for ingestions. More mobile, make sure balderas are up.       RTC three months for 18 month visit.          id#[id number]    
Bipolar illness

## 2024-06-25 PROCEDURE — 99231 SBSQ HOSP IP/OBS SF/LOW 25: CPT

## 2024-06-25 RX ADMIN — RISPERIDONE 1.5 MILLIGRAM(S): 0.5 TABLET ORAL at 21:29

## 2024-06-25 RX ADMIN — AMLODIPINE BESYLATE 5 MILLIGRAM(S): 2.5 TABLET ORAL at 09:58

## 2024-06-25 RX ADMIN — TAMSULOSIN HYDROCHLORIDE 0.4 MILLIGRAM(S): 0.4 CAPSULE ORAL at 21:29

## 2024-06-25 RX ADMIN — Medication 1 TABLET(S): at 09:58

## 2024-06-25 RX ADMIN — Medication 5 MILLIGRAM(S): at 21:30

## 2024-06-25 RX ADMIN — VALPROIC ACID 750 MILLIGRAM(S): 250 SOLUTION ORAL at 09:58

## 2024-06-25 RX ADMIN — Medication 25 MICROGRAM(S): at 06:13

## 2024-06-25 RX ADMIN — Medication 325 MILLIGRAM(S): at 09:58

## 2024-06-25 RX ADMIN — VALPROIC ACID 750 MILLIGRAM(S): 250 SOLUTION ORAL at 21:29

## 2024-06-26 PROCEDURE — 99233 SBSQ HOSP IP/OBS HIGH 50: CPT

## 2024-06-26 RX ADMIN — TAMSULOSIN HYDROCHLORIDE 0.4 MILLIGRAM(S): 0.4 CAPSULE ORAL at 21:11

## 2024-06-26 RX ADMIN — Medication 5 MILLIGRAM(S): at 21:11

## 2024-06-26 RX ADMIN — Medication 1 TABLET(S): at 11:00

## 2024-06-26 RX ADMIN — VALPROIC ACID 750 MILLIGRAM(S): 250 SOLUTION ORAL at 10:59

## 2024-06-26 RX ADMIN — Medication 25 MICROGRAM(S): at 06:18

## 2024-06-26 RX ADMIN — VALPROIC ACID 750 MILLIGRAM(S): 250 SOLUTION ORAL at 21:11

## 2024-06-26 RX ADMIN — AMLODIPINE BESYLATE 5 MILLIGRAM(S): 2.5 TABLET ORAL at 10:59

## 2024-06-26 RX ADMIN — Medication 325 MILLIGRAM(S): at 10:59

## 2024-06-26 RX ADMIN — RISPERIDONE 1.5 MILLIGRAM(S): 0.5 TABLET ORAL at 21:11

## 2024-06-27 PROCEDURE — 99231 SBSQ HOSP IP/OBS SF/LOW 25: CPT

## 2024-06-27 RX ADMIN — Medication 1 TABLET(S): at 17:48

## 2024-06-27 RX ADMIN — Medication 325 MILLIGRAM(S): at 17:48

## 2024-06-27 RX ADMIN — AMLODIPINE BESYLATE 5 MILLIGRAM(S): 2.5 TABLET ORAL at 17:47

## 2024-06-27 RX ADMIN — VALPROIC ACID 750 MILLIGRAM(S): 250 SOLUTION ORAL at 22:20

## 2024-06-27 RX ADMIN — Medication 5 MILLIGRAM(S): at 22:20

## 2024-06-27 RX ADMIN — RISPERIDONE 1.5 MILLIGRAM(S): 0.5 TABLET ORAL at 22:20

## 2024-06-27 RX ADMIN — TAMSULOSIN HYDROCHLORIDE 0.4 MILLIGRAM(S): 0.4 CAPSULE ORAL at 22:20

## 2024-06-27 RX ADMIN — Medication 25 MICROGRAM(S): at 05:55

## 2024-06-27 RX ADMIN — VALPROIC ACID 750 MILLIGRAM(S): 250 SOLUTION ORAL at 17:48

## 2024-06-28 VITALS
OXYGEN SATURATION: 98 % | TEMPERATURE: 99 F | SYSTOLIC BLOOD PRESSURE: 135 MMHG | DIASTOLIC BLOOD PRESSURE: 78 MMHG | HEART RATE: 91 BPM

## 2024-06-28 PROCEDURE — 97161 PT EVAL LOW COMPLEX 20 MIN: CPT

## 2024-06-28 PROCEDURE — 85027 COMPLETE CBC AUTOMATED: CPT

## 2024-06-28 PROCEDURE — 80053 COMPREHEN METABOLIC PANEL: CPT

## 2024-06-28 PROCEDURE — 99239 HOSP IP/OBS DSCHRG MGMT >30: CPT

## 2024-06-28 PROCEDURE — 80164 ASSAY DIPROPYLACETIC ACD TOT: CPT

## 2024-06-28 PROCEDURE — 83036 HEMOGLOBIN GLYCOSYLATED A1C: CPT

## 2024-06-28 PROCEDURE — 80061 LIPID PANEL: CPT

## 2024-06-28 PROCEDURE — 36415 COLL VENOUS BLD VENIPUNCTURE: CPT

## 2024-06-28 RX ORDER — TAMSULOSIN HYDROCHLORIDE 0.4 MG/1
1 CAPSULE ORAL
Refills: 0 | DISCHARGE

## 2024-06-28 RX ORDER — FERROUS SULFATE 325(65) MG
1 TABLET ORAL
Qty: 30 | Refills: 0
Start: 2024-06-28 | End: 2024-07-27

## 2024-06-28 RX ORDER — TAMSULOSIN HYDROCHLORIDE 0.4 MG/1
1 CAPSULE ORAL
Qty: 30 | Refills: 0
Start: 2024-06-28 | End: 2024-07-27

## 2024-06-28 RX ORDER — LEVOTHYROXINE SODIUM 25 MCG
1 TABLET ORAL
Qty: 30 | Refills: 0
Start: 2024-06-28 | End: 2024-07-27

## 2024-06-28 RX ORDER — LEVOTHYROXINE SODIUM 125 MCG
1 TABLET ORAL
Refills: 0 | DISCHARGE

## 2024-06-28 RX ORDER — RISPERIDONE 0.5 MG/1
3 TABLET ORAL
Qty: 90 | Refills: 0
Start: 2024-06-28 | End: 2024-07-27

## 2024-06-28 RX ORDER — AMLODIPINE BESYLATE 2.5 MG/1
1 TABLET ORAL
Qty: 30 | Refills: 0
Start: 2024-06-28 | End: 2024-07-27

## 2024-06-28 RX ORDER — VALPROIC ACID 250 MG/5ML
3 SOLUTION ORAL
Qty: 180 | Refills: 0
Start: 2024-06-28 | End: 2024-07-27

## 2024-06-28 RX ADMIN — VALPROIC ACID 750 MILLIGRAM(S): 250 SOLUTION ORAL at 10:30

## 2024-06-28 RX ADMIN — AMLODIPINE BESYLATE 5 MILLIGRAM(S): 2.5 TABLET ORAL at 10:30

## 2024-06-28 RX ADMIN — Medication 25 MICROGRAM(S): at 06:12

## 2024-06-28 RX ADMIN — Medication 325 MILLIGRAM(S): at 10:31

## 2024-06-28 RX ADMIN — Medication 1 TABLET(S): at 10:31

## 2024-07-01 ENCOUNTER — NON-APPOINTMENT (OUTPATIENT)
Age: 69
End: 2024-07-01

## 2024-07-01 DIAGNOSIS — F03.92 UNSPECIFIED DEMENTIA, UNSPECIFIED SEVERITY, WITH PSYCHOTIC DISTURBANCE: ICD-10-CM

## 2024-07-01 DIAGNOSIS — I10 ESSENTIAL (PRIMARY) HYPERTENSION: ICD-10-CM

## 2024-07-01 DIAGNOSIS — F31.5 BIPOLAR DISORDER, CURRENT EPISODE DEPRESSED, SEVERE, WITH PSYCHOTIC FEATURES: ICD-10-CM

## 2024-07-01 DIAGNOSIS — Z91.83 WANDERING IN DISEASES CLASSIFIED ELSEWHERE: ICD-10-CM

## 2024-07-01 DIAGNOSIS — Z91.81 HISTORY OF FALLING: ICD-10-CM

## 2024-07-01 DIAGNOSIS — N18.9 CHRONIC KIDNEY DISEASE, UNSPECIFIED: ICD-10-CM

## 2024-07-01 DIAGNOSIS — F03.911 UNSPECIFIED DEMENTIA, UNSPECIFIED SEVERITY, WITH AGITATION: ICD-10-CM

## 2024-07-01 DIAGNOSIS — E66.9 OBESITY, UNSPECIFIED: ICD-10-CM

## 2024-07-01 DIAGNOSIS — Z79.890 HORMONE REPLACEMENT THERAPY: ICD-10-CM

## 2024-07-01 DIAGNOSIS — Z87.448 PERSONAL HISTORY OF OTHER DISEASES OF URINARY SYSTEM: ICD-10-CM

## 2024-07-01 DIAGNOSIS — E03.9 HYPOTHYROIDISM, UNSPECIFIED: ICD-10-CM

## 2024-07-01 DIAGNOSIS — F31.2 BIPOLAR DISORDER, CURRENT EPISODE MANIC SEVERE WITH PSYCHOTIC FEATURES: ICD-10-CM

## 2024-07-01 DIAGNOSIS — Z86.16 PERSONAL HISTORY OF COVID-19: ICD-10-CM

## 2024-07-01 DIAGNOSIS — H91.90 UNSPECIFIED HEARING LOSS, UNSPECIFIED EAR: ICD-10-CM

## 2024-07-01 DIAGNOSIS — F03.93 UNSPECIFIED DEMENTIA, UNSPECIFIED SEVERITY, WITH MOOD DISTURBANCE: ICD-10-CM

## 2024-07-01 DIAGNOSIS — F31.9 BIPOLAR DISORDER, UNSPECIFIED: ICD-10-CM

## 2024-07-02 ENCOUNTER — APPOINTMENT (OUTPATIENT)
Dept: INTERNAL MEDICINE | Facility: CLINIC | Age: 69
End: 2024-07-02
Payer: COMMERCIAL

## 2024-07-02 ENCOUNTER — LABORATORY RESULT (OUTPATIENT)
Age: 69
End: 2024-07-02

## 2024-07-02 VITALS
BODY MASS INDEX: 32.07 KG/M2 | DIASTOLIC BLOOD PRESSURE: 70 MMHG | SYSTOLIC BLOOD PRESSURE: 140 MMHG | TEMPERATURE: 99 F | HEART RATE: 91 BPM | OXYGEN SATURATION: 97 % | WEIGHT: 224 LBS | HEIGHT: 70 IN

## 2024-07-02 VITALS — SYSTOLIC BLOOD PRESSURE: 110 MMHG | DIASTOLIC BLOOD PRESSURE: 66 MMHG

## 2024-07-02 DIAGNOSIS — F31.9 BIPOLAR DISORDER, UNSPECIFIED: ICD-10-CM

## 2024-07-02 DIAGNOSIS — E03.9 HYPOTHYROIDISM, UNSPECIFIED: ICD-10-CM

## 2024-07-02 DIAGNOSIS — D64.9 ANEMIA, UNSPECIFIED: ICD-10-CM

## 2024-07-02 DIAGNOSIS — I10 ESSENTIAL (PRIMARY) HYPERTENSION: ICD-10-CM

## 2024-07-02 PROCEDURE — 36415 COLL VENOUS BLD VENIPUNCTURE: CPT

## 2024-07-02 PROCEDURE — 99496 TRANSJ CARE MGMT HIGH F2F 7D: CPT

## 2024-07-02 RX ORDER — AMLODIPINE BESYLATE 5 MG/1
5 TABLET ORAL
Qty: 90 | Refills: 1 | Status: ACTIVE | COMMUNITY

## 2024-07-02 RX ORDER — RISPERIDONE 0.5 MG/1
0.5 TABLET ORAL
Qty: 90 | Refills: 0 | Status: ACTIVE | COMMUNITY

## 2024-07-02 RX ORDER — VALPROIC ACID 250 MG/1
250 CAPSULE, LIQUID FILLED ORAL
Qty: 90 | Refills: 0 | Status: ACTIVE | COMMUNITY

## 2024-07-02 RX ORDER — FERROUS SULFATE TAB 325 MG (65 MG ELEMENTAL FE) 325 (65 FE) MG
325 (65 FE) TAB ORAL
Qty: 90 | Refills: 3 | Status: ACTIVE | COMMUNITY

## 2024-07-04 LAB
ALBUMIN SERPL ELPH-MCNC: 4.4 G/DL
ALP BLD-CCNC: 91 U/L
ALT SERPL-CCNC: 25 U/L
ANION GAP SERPL CALC-SCNC: 14 MMOL/L
AST SERPL-CCNC: 30 U/L
BASOPHILS # BLD AUTO: 0.09 K/UL
BASOPHILS NFR BLD AUTO: 0.9 %
BILIRUB SERPL-MCNC: <0.2 MG/DL
BUN SERPL-MCNC: 33 MG/DL
CALCIUM SERPL-MCNC: 9.1 MG/DL
CHLORIDE SERPL-SCNC: 106 MMOL/L
CO2 SERPL-SCNC: 19 MMOL/L
CREAT SERPL-MCNC: 1.39 MG/DL
EGFR: 55 ML/MIN/1.73M2
EOSINOPHIL # BLD AUTO: 0.45 K/UL
EOSINOPHIL NFR BLD AUTO: 4.3 %
ESTIMATED AVERAGE GLUCOSE: 111 MG/DL
GLUCOSE SERPL-MCNC: 109 MG/DL
HBA1C MFR BLD HPLC: 5.5 %
HCT VFR BLD CALC: 39.3 %
HGB BLD-MCNC: 12.8 G/DL
LYMPHOCYTES # BLD AUTO: 2.81 K/UL
LYMPHOCYTES NFR BLD AUTO: 27 %
MAN DIFF?: NORMAL
MCHC RBC-ENTMCNC: 28.1 PG
MCHC RBC-ENTMCNC: 32.6 GM/DL
MCV RBC AUTO: 86.2 FL
MONOCYTES # BLD AUTO: 1.27 K/UL
MONOCYTES NFR BLD AUTO: 12.2 %
NEUTROPHILS # BLD AUTO: 5.78 K/UL
NEUTROPHILS NFR BLD AUTO: 55.6 %
PLATELET # BLD AUTO: 206 K/UL
POTASSIUM SERPL-SCNC: 4.4 MMOL/L
PROT SERPL-MCNC: 7.2 G/DL
RBC # BLD: 4.56 M/UL
RBC # FLD: 20.2 %
SODIUM SERPL-SCNC: 139 MMOL/L
TSH SERPL-ACNC: 4.9 UIU/ML
VALPROATE SERPL-MCNC: 4 UG/ML
WBC # FLD AUTO: 10.4 K/UL

## 2024-07-17 ENCOUNTER — APPOINTMENT (OUTPATIENT)
Dept: INTERNAL MEDICINE | Facility: CLINIC | Age: 69
End: 2024-07-17

## 2024-08-27 NOTE — BH CONSULTATION LIAISON PROGRESS NOTE - NSBHMSEREMMEM_PSY_A_CORE
No protocol for requested medication.    Medication: amphetamine-dextroamphetamine XR (ADDERALL XR) 10 MG 24 hr capsule   Last office visit date: 06/07/2024  Pharmacy: [id number] - [place], [place] - [address]    Order pended, routed to clinician for review.       Orders pended, and routed to provider's nurse pool for review and or approval.   Please route any notes back to your nursing pool.       Thank you, Refill Center Staff  
PDMP reviewed; no aberrant behavior identified, prescription authorized.    Medication:   Amphetamine-Dextroamphetamine  10MG / Capsule Extended Release 24 Hour  Rx# [id number] Stimulant Qty: 30  Days: 30  Refills: 0 Prescribed: 8/1/2024  Dispensed: 8/1/2024  Sold: 8/2/2024       Last refill: 8/2/2024  Next appt: 12/13/2024  
Normal

## 2024-11-26 NOTE — PROGRESS NOTE ADULT - PROBLEM SELECTOR PLAN 2
Heparin for DVT prevention
Patient with difficulty breathing tonight, cough, headache, and fever (tmax 101)   Per mother, 02 saturations would drop in 80s at home   No vomiting, +diarrhea   No motrin or tylenol given PTA   
Heparin for DVT prevention

## 2025-01-31 ENCOUNTER — TRANSCRIPTION ENCOUNTER (OUTPATIENT)
Age: 70
End: 2025-01-31

## 2025-01-31 RX ORDER — TRAZODONE HYDROCHLORIDE 50 MG/1
50 TABLET ORAL
Qty: 60 | Refills: 0 | Status: ACTIVE | COMMUNITY
Start: 1900-01-01 | End: 1900-01-01

## 2025-02-13 NOTE — DIETITIAN INITIAL EVALUATION ADULT - PHYSICAL ASSESSMENT TEMPLES
Bretin Merino is a 49 year old male here for  No chief complaint on file.    Denies latex allergy or sensitivity.    Medication verified, no changes.  PCP and Pharmacy verified.    Social History     Tobacco Use   Smoking Status Former    Current packs/day: 0.50    Average packs/day: 0.5 packs/day for 28.1 years (14.1 ttl pk-yrs)    Types: Cigarettes    Start date: 1/1/1997    Passive exposure: Current   Smokeless Tobacco Never   Tobacco Comments    given 1-800 quit information; currently;   5-6 cig per day     Advance Directives Filed: No    ECOG:   ECOG [02/13/25 0933]   ECOG Performance Status 1       Vitals:    Visit Vitals  /62   Pulse 96   Temp 98.3 °F (36.8 °C) (Oral)   Resp 14   Wt (!) 42.5 kg (93 lb 12.9 oz)   SpO2 98%   BMI 14.90 kg/m²       These vital signs are:  Within defined parameters (Per Reference \"Defined Limits Hospital Outpatient Department (HOD)\")    Height: No.  Ht Readings from Last 1 Encounters:   02/05/25 5' 6.54\" (1.69 m)     Weight:Yes, shoes on.  Wt Readings from Last 3 Encounters:   02/13/25 (!) 42.5 kg (93 lb 12.9 oz)   02/05/25 (!) 44.6 kg (98 lb 3.4 oz)   01/30/25 (!) 42.9 kg (94 lb 9.2 oz)       BMI: Body mass index is 14.9 kg/m².    REVIEW OF SYSTEMS  GENERAL:  Patient denies headache, fevers, chills, night sweats, excessive fatigue, change in appetite, weight loss, dizziness  ALLERGIC/IMMUNOLOGIC: Verified allergies: Yes  EYES:  Patient denies significant visual difficulties, double vision, blurred vision  ENT/MOUTH: Patient denies problems with hearing, sore throat, mouth sores, but complains of: sinus drainage  ENDOCRINE:  Patient denies diabetes, thyroid disease, hormone replacement, hot flashes  HEMATOLOGIC/LYMPHATIC: Patient denies easy bruising, bleeding, tender lymph nodes, swollen lymph nodes  BREASTS: Patient denies abnormal masses of breast, nipple discharge, pain  RESPIRATORY:  Patient denies lung pain with breathing, cough, coughing up blood, shortness of  breath, but complains of: cough  CARDIOVASCULAR:  Patient denies anginal chest pain, palpitations, shortness of breath when lying flat, peripheral edema  GASTROINTESTINAL: Patient denies abdominal pain , nausea, vomiting, diarrhea, GI bleeding, constipation, change in bowel habits, heartburn, sensation of feeling full, but complains of: difficulty swallowing  : Patient denies blood in the urine, burning with urination, frequency, urgency, hesitancy, incontinence  MUSCULOSKELETAL:  Patient denies joint pain, bone pain, joint swelling, redness, decreased range of motion  SKIN:  Patient denies chronic rashes, inflammation, ulcerations, skin changes, itching  NEUROLOGIC:  Patient denies loss of balance, areas of focal weakness, abnormal gait, sensory problems, numbness, tingling  PSYCHIATRIC: Patient denies insomnia, depression, anxiety    This patient reported abnormal symptoms that needed immediate verbal communication: No    mild

## 2025-03-05 ENCOUNTER — APPOINTMENT (OUTPATIENT)
Dept: INTERNAL MEDICINE | Facility: CLINIC | Age: 70
End: 2025-03-05

## 2025-06-09 ENCOUNTER — APPOINTMENT (OUTPATIENT)
Dept: INTERNAL MEDICINE | Facility: CLINIC | Age: 70
End: 2025-06-09

## 2025-07-01 ENCOUNTER — RX RENEWAL (OUTPATIENT)
Age: 70
End: 2025-07-01

## 2025-07-15 NOTE — OCCUPATIONAL THERAPY INITIAL EVALUATION ADULT - PHYSICAL ASSIST/NONPHYSICAL ASSIST:DRESS LOWER BODY, OT EVAL
[email or web address] set-up required

## 2025-09-09 ENCOUNTER — APPOINTMENT (OUTPATIENT)
Dept: INTERNAL MEDICINE | Facility: CLINIC | Age: 70
End: 2025-09-09
Payer: COMMERCIAL

## 2025-09-09 VITALS
DIASTOLIC BLOOD PRESSURE: 76 MMHG | OXYGEN SATURATION: 97 % | HEIGHT: 70 IN | RESPIRATION RATE: 16 BRPM | BODY MASS INDEX: 25.05 KG/M2 | WEIGHT: 175 LBS | HEART RATE: 87 BPM | SYSTOLIC BLOOD PRESSURE: 116 MMHG | TEMPERATURE: 98.8 F

## 2025-09-09 DIAGNOSIS — F25.9 SCHIZOAFFECTIVE DISORDER, UNSPECIFIED: ICD-10-CM

## 2025-09-09 DIAGNOSIS — F60.81: ICD-10-CM

## 2025-09-09 DIAGNOSIS — E66.3 OVERWEIGHT: ICD-10-CM

## 2025-09-09 DIAGNOSIS — F31.9 BIPOLAR DISORDER, UNSPECIFIED: ICD-10-CM

## 2025-09-09 DIAGNOSIS — E03.9 HYPOTHYROIDISM, UNSPECIFIED: ICD-10-CM

## 2025-09-09 DIAGNOSIS — E66.811 OBESITY, CLASS 1: ICD-10-CM

## 2025-09-09 DIAGNOSIS — E55.9 VITAMIN D DEFICIENCY, UNSPECIFIED: ICD-10-CM

## 2025-09-09 DIAGNOSIS — E78.1 PURE HYPERGLYCERIDEMIA: ICD-10-CM

## 2025-09-09 PROCEDURE — G2211 COMPLEX E/M VISIT ADD ON: CPT | Mod: NC

## 2025-09-09 PROCEDURE — 99214 OFFICE O/P EST MOD 30 MIN: CPT

## 2025-09-09 PROCEDURE — 36415 COLL VENOUS BLD VENIPUNCTURE: CPT

## 2025-09-15 LAB
25(OH)D3 SERPL-MCNC: 16.6 NG/ML
ALBUMIN SERPL ELPH-MCNC: 4.6 G/DL
ALP BLD-CCNC: 72 U/L
ALT SERPL-CCNC: 15 U/L
ANION GAP SERPL CALC-SCNC: 16 MMOL/L
AST SERPL-CCNC: 26 U/L
BASOPHILS # BLD AUTO: 0.04 K/UL
BASOPHILS NFR BLD AUTO: 0.5 %
BILIRUB SERPL-MCNC: 0.5 MG/DL
BUN SERPL-MCNC: 23 MG/DL
CALCIUM SERPL-MCNC: 10 MG/DL
CHLORIDE SERPL-SCNC: 102 MMOL/L
CHOLEST SERPL-MCNC: 173 MG/DL
CO2 SERPL-SCNC: 19 MMOL/L
CREAT SERPL-MCNC: 1.27 MG/DL
EGFRCR SERPLBLD CKD-EPI 2021: 60 ML/MIN/1.73M2
EOSINOPHIL # BLD AUTO: 0.04 K/UL
EOSINOPHIL NFR BLD AUTO: 0.5 %
ESTIMATED AVERAGE GLUCOSE: 108 MG/DL
GLUCOSE SERPL-MCNC: 95 MG/DL
HBA1C MFR BLD HPLC: 5.4 %
HCT VFR BLD CALC: 45.4 %
HDLC SERPL-MCNC: 51 MG/DL
HGB BLD-MCNC: 15 G/DL
IMM GRANULOCYTES NFR BLD AUTO: 0.8 %
LDLC SERPL-MCNC: 103 MG/DL
LYMPHOCYTES # BLD AUTO: 1.24 K/UL
LYMPHOCYTES NFR BLD AUTO: 16.9 %
MAN DIFF?: NORMAL
MCHC RBC-ENTMCNC: 31.2 PG
MCHC RBC-ENTMCNC: 33 G/DL
MCV RBC AUTO: 94.4 FL
MONOCYTES # BLD AUTO: 1 K/UL
MONOCYTES NFR BLD AUTO: 13.6 %
NEUTROPHILS # BLD AUTO: 4.96 K/UL
NEUTROPHILS NFR BLD AUTO: 67.7 %
NONHDLC SERPL-MCNC: 122 MG/DL
PLATELET # BLD AUTO: 173 K/UL
POTASSIUM SERPL-SCNC: 4.2 MMOL/L
PROT SERPL-MCNC: 7.4 G/DL
RBC # BLD: 4.81 M/UL
RBC # FLD: 13.6 %
SODIUM SERPL-SCNC: 138 MMOL/L
TRIGL SERPL-MCNC: 108 MG/DL
TSH SERPL-ACNC: 3.37 UIU/ML
VALPROATE SERPL-MCNC: 90 UG/ML
VIT B12 SERPL-MCNC: 1063 PG/ML
WBC # FLD AUTO: 7.34 K/UL